# Patient Record
Sex: MALE | Race: WHITE | HISPANIC OR LATINO | Employment: FULL TIME | URBAN - METROPOLITAN AREA
[De-identification: names, ages, dates, MRNs, and addresses within clinical notes are randomized per-mention and may not be internally consistent; named-entity substitution may affect disease eponyms.]

---

## 2017-02-22 ENCOUNTER — HOSPITAL ENCOUNTER (EMERGENCY)
Facility: HOSPITAL | Age: 47
Discharge: HOME | End: 2017-02-22
Attending: EMERGENCY MEDICINE | Admitting: EMERGENCY MEDICINE

## 2017-02-22 ENCOUNTER — APPOINTMENT (OUTPATIENT)
Dept: GENERAL RADIOLOGY | Facility: HOSPITAL | Age: 47
End: 2017-02-22

## 2017-02-22 ENCOUNTER — APPOINTMENT (OUTPATIENT)
Dept: CT IMAGING | Facility: HOSPITAL | Age: 47
End: 2017-02-22

## 2017-02-22 VITALS
TEMPERATURE: 98.6 F | RESPIRATION RATE: 16 BRPM | WEIGHT: 235 LBS | OXYGEN SATURATION: 98 % | DIASTOLIC BLOOD PRESSURE: 96 MMHG | SYSTOLIC BLOOD PRESSURE: 135 MMHG | BODY MASS INDEX: 35.61 KG/M2 | HEIGHT: 68 IN | HEART RATE: 59 BPM

## 2017-02-22 DIAGNOSIS — K52.9 CHRONIC DIARRHEA: ICD-10-CM

## 2017-02-22 DIAGNOSIS — R07.89 CHEST TIGHTNESS: ICD-10-CM

## 2017-02-22 DIAGNOSIS — I10 UNCONTROLLED HYPERTENSION: ICD-10-CM

## 2017-02-22 DIAGNOSIS — K76.0 FATTY LIVER: ICD-10-CM

## 2017-02-22 DIAGNOSIS — Z91.14 NONCOMPLIANCE WITH MEDICATIONS: ICD-10-CM

## 2017-02-22 DIAGNOSIS — M54.2 NECK PAIN ON LEFT SIDE: Primary | ICD-10-CM

## 2017-02-22 LAB
ALBUMIN SERPL-MCNC: 3.7 G/DL (ref 3.2–4.8)
ALBUMIN/GLOB SERPL: 1.3 G/DL (ref 1.5–2.5)
ALP SERPL-CCNC: 92 U/L (ref 25–100)
ALT SERPL W P-5'-P-CCNC: 293 U/L (ref 7–40)
ANION GAP SERPL CALCULATED.3IONS-SCNC: 3 MMOL/L (ref 3–11)
AST SERPL-CCNC: 139 U/L (ref 0–33)
BASOPHILS # BLD AUTO: 0.03 10*3/MM3 (ref 0–0.2)
BASOPHILS NFR BLD AUTO: 0.5 % (ref 0–1)
BILIRUB SERPL-MCNC: 0.5 MG/DL (ref 0.3–1.2)
BNP SERPL-MCNC: 39 PG/ML (ref 0–100)
BUN BLD-MCNC: 9 MG/DL (ref 9–23)
BUN/CREAT SERPL: 11.3 (ref 7–25)
CALCIUM SPEC-SCNC: 8.5 MG/DL (ref 8.7–10.4)
CHLORIDE SERPL-SCNC: 109 MMOL/L (ref 99–109)
CO2 SERPL-SCNC: 28 MMOL/L (ref 20–31)
CREAT BLD-MCNC: 0.8 MG/DL (ref 0.6–1.3)
DEPRECATED RDW RBC AUTO: 45.5 FL (ref 37–54)
EOSINOPHIL # BLD AUTO: 0.2 10*3/MM3 (ref 0.1–0.3)
EOSINOPHIL NFR BLD AUTO: 3.6 % (ref 0–3)
ERYTHROCYTE [DISTWIDTH] IN BLOOD BY AUTOMATED COUNT: 13.4 % (ref 11.3–14.5)
GFR SERPL CREATININE-BSD FRML MDRD: 103 ML/MIN/1.73
GLOBULIN UR ELPH-MCNC: 2.9 GM/DL
GLUCOSE BLD-MCNC: 105 MG/DL (ref 70–100)
HCT VFR BLD AUTO: 38.8 % (ref 38.9–50.9)
HGB BLD-MCNC: 13.7 G/DL (ref 13.1–17.5)
HOLD SPECIMEN: NORMAL
HOLD SPECIMEN: NORMAL
IMM GRANULOCYTES # BLD: 0.01 10*3/MM3 (ref 0–0.03)
IMM GRANULOCYTES NFR BLD: 0.2 % (ref 0–0.6)
LIPASE SERPL-CCNC: 32 U/L (ref 6–51)
LYMPHOCYTES # BLD AUTO: 1.93 10*3/MM3 (ref 0.6–4.8)
LYMPHOCYTES NFR BLD AUTO: 34.6 % (ref 24–44)
MCH RBC QN AUTO: 32.8 PG (ref 27–31)
MCHC RBC AUTO-ENTMCNC: 35.3 G/DL (ref 32–36)
MCV RBC AUTO: 92.8 FL (ref 80–99)
MONOCYTES # BLD AUTO: 0.56 10*3/MM3 (ref 0–1)
MONOCYTES NFR BLD AUTO: 10.1 % (ref 0–12)
NEUTROPHILS # BLD AUTO: 2.84 10*3/MM3 (ref 1.5–8.3)
NEUTROPHILS NFR BLD AUTO: 51 % (ref 41–71)
PLATELET # BLD AUTO: 200 10*3/MM3 (ref 150–450)
PMV BLD AUTO: 10.2 FL (ref 6–12)
POTASSIUM BLD-SCNC: 3.6 MMOL/L (ref 3.5–5.5)
PROT SERPL-MCNC: 6.6 G/DL (ref 5.7–8.2)
RBC # BLD AUTO: 4.18 10*6/MM3 (ref 4.2–5.76)
SODIUM BLD-SCNC: 140 MMOL/L (ref 132–146)
TROPONIN I SERPL-MCNC: 0 NG/ML (ref 0–0.07)
WBC NRBC COR # BLD: 5.57 10*3/MM3 (ref 3.5–10.8)
WHOLE BLOOD HOLD SPECIMEN: NORMAL
WHOLE BLOOD HOLD SPECIMEN: NORMAL

## 2017-02-22 PROCEDURE — 96374 THER/PROPH/DIAG INJ IV PUSH: CPT

## 2017-02-22 PROCEDURE — 96376 TX/PRO/DX INJ SAME DRUG ADON: CPT

## 2017-02-22 PROCEDURE — 85025 COMPLETE CBC W/AUTO DIFF WBC: CPT | Performed by: EMERGENCY MEDICINE

## 2017-02-22 PROCEDURE — 83690 ASSAY OF LIPASE: CPT | Performed by: EMERGENCY MEDICINE

## 2017-02-22 PROCEDURE — 80053 COMPREHEN METABOLIC PANEL: CPT | Performed by: EMERGENCY MEDICINE

## 2017-02-22 PROCEDURE — 93005 ELECTROCARDIOGRAM TRACING: CPT | Performed by: EMERGENCY MEDICINE

## 2017-02-22 PROCEDURE — 96375 TX/PRO/DX INJ NEW DRUG ADDON: CPT

## 2017-02-22 PROCEDURE — 25010000002 HYDRALAZINE PER 20 MG: Performed by: EMERGENCY MEDICINE

## 2017-02-22 PROCEDURE — 71010 HC CHEST PA OR AP: CPT

## 2017-02-22 PROCEDURE — 25010000002 DIAZEPAM PER 5 MG: Performed by: EMERGENCY MEDICINE

## 2017-02-22 PROCEDURE — 99285 EMERGENCY DEPT VISIT HI MDM: CPT

## 2017-02-22 PROCEDURE — 70450 CT HEAD/BRAIN W/O DYE: CPT

## 2017-02-22 PROCEDURE — 83880 ASSAY OF NATRIURETIC PEPTIDE: CPT | Performed by: EMERGENCY MEDICINE

## 2017-02-22 PROCEDURE — 84484 ASSAY OF TROPONIN QUANT: CPT

## 2017-02-22 RX ORDER — HYDROCHLOROTHIAZIDE 12.5 MG/1
12.5 TABLET ORAL DAILY
Status: ON HOLD | COMMUNITY
End: 2018-06-11

## 2017-02-22 RX ORDER — EMTRICITABINE AND TENOFOVIR DISOPROXIL FUMARATE 200; 300 MG/1; MG/1
1 TABLET, FILM COATED ORAL DAILY
COMMUNITY
End: 2017-02-27

## 2017-02-22 RX ORDER — DIAZEPAM 5 MG/ML
5 INJECTION, SOLUTION INTRAMUSCULAR; INTRAVENOUS ONCE
Status: COMPLETED | OUTPATIENT
Start: 2017-02-22 | End: 2017-02-22

## 2017-02-22 RX ORDER — MECLIZINE HYDROCHLORIDE 25 MG/1
25 TABLET ORAL 3 TIMES DAILY PRN
Qty: 21 TABLET | Refills: 0 | Status: SHIPPED | OUTPATIENT
Start: 2017-02-22 | End: 2017-02-27

## 2017-02-22 RX ORDER — SODIUM CHLORIDE 0.9 % (FLUSH) 0.9 %
10 SYRINGE (ML) INJECTION AS NEEDED
Status: DISCONTINUED | OUTPATIENT
Start: 2017-02-22 | End: 2017-02-22 | Stop reason: HOSPADM

## 2017-02-22 RX ORDER — HYDRALAZINE HYDROCHLORIDE 20 MG/ML
10 INJECTION INTRAMUSCULAR; INTRAVENOUS ONCE
Status: COMPLETED | OUTPATIENT
Start: 2017-02-22 | End: 2017-02-22

## 2017-02-22 RX ORDER — AMLODIPINE BESYLATE 10 MG/1
10 TABLET ORAL DAILY
Status: ON HOLD | COMMUNITY
End: 2018-06-11

## 2017-02-22 RX ORDER — ASPIRIN 81 MG/1
324 TABLET, CHEWABLE ORAL ONCE
Status: COMPLETED | OUTPATIENT
Start: 2017-02-22 | End: 2017-02-22

## 2017-02-22 RX ORDER — TRAMADOL HYDROCHLORIDE 50 MG/1
50 TABLET ORAL EVERY 4 HOURS PRN
Qty: 20 TABLET | Refills: 0 | Status: SHIPPED | OUTPATIENT
Start: 2017-02-22 | End: 2017-02-27

## 2017-02-22 RX ORDER — LOSARTAN POTASSIUM 100 MG/1
100 TABLET ORAL DAILY
Status: ON HOLD | COMMUNITY
End: 2018-06-11

## 2017-02-22 RX ADMIN — ASPIRIN 81 MG 324 MG: 81 TABLET ORAL at 15:26

## 2017-02-22 RX ADMIN — HYDRALAZINE HYDROCHLORIDE 10 MG: 20 INJECTION INTRAMUSCULAR; INTRAVENOUS at 15:32

## 2017-02-22 RX ADMIN — HYDRALAZINE HYDROCHLORIDE 10 MG: 20 INJECTION INTRAMUSCULAR; INTRAVENOUS at 17:49

## 2017-02-22 RX ADMIN — DIAZEPAM 5 MG: 5 INJECTION, SOLUTION INTRAMUSCULAR; INTRAVENOUS at 15:34

## 2017-02-22 NOTE — DISCHARGE INSTRUCTIONS
Follow up with one of the [facility] physician groups below to setup primary care. If you have trouble following up, please call Anya Khan, our transitional care nurse, at [phone].    (Dr. Fitch, Dr. Saavedra, Dr. Yost, and Dr. Velez.)  [facility], Primary Care, [phone], [address], [place] [address]    [facility], Primary Care, [phone], [address], [address] [place], [address] [facility]) [facility] Medical [facility], Primary Care, [phone], [address], [address] [place], [address] [facility], Primary Care, [phone], [address] [place], [address]     [place] [facility], Primary Care, [phone], [address], [address] [place], [address]    [place] [facility], Primary Care, [phone], [address]    [place], [address] [facility], Primary Care, [phone], [address], [address] [place], [address] [facility], Primary Care, [phone], [address] [place], [address] [facility]) [facility], Primary Care, [phone], [address] [place], [address] [facility], Primary Care, [phone], [address], [address] [place], [address] [facility], Primary Care, [phone], [address] [facility] [facility], [place], [address]     [facility], Primary Care, [phone], [address] [id number]

## 2017-02-22 NOTE — ED PROVIDER NOTES
Subjective   HPI Comments: The patient presents to emergency department via EMS secondary to complaint of elevated blood pressure as well as left-sided neck pain and bitemporal headache.  The patient reports he developed the symptoms this morning.  He reports the initial symptoms were in the posterior left side of the neck.  He reports it radiated up and eventually caused a severe bitemporal headache.  The patient also noted some tingling lateral to the left eye and some tingling in the left cheek.  The patient reports a history of hypertension and states he hasn't taken his blood pressure medicine for the last 2-3 days.  The patient denies that the headache was thunderclap.  The symptoms are much better now with only some mild pain in the left side of the neck as well as in the temporal regions.  The patient in route to the hospital with the ambulance had a brief episode of chest tightness.  He denies any decreased exercise tolerance or decreased activity.  He denies any dyspnea on exertion or pain on exertion.  The patient's history includes hypertension, fatty liver, and a history of some sort of blockage so the patient doesn't know where.      History provided by:  Patient and EMS personnel      Review of Systems   Constitutional: Negative.  Negative for chills and fever.   Respiratory: Positive for chest tightness.    Cardiovascular: Negative.    Gastrointestinal: Negative.    Genitourinary: Negative.    Musculoskeletal: Positive for neck pain. Negative for back pain.   Allergic/Immunologic: Negative for immunocompromised state.   Neurological: Positive for dizziness and headaches.   Hematological: Does not bruise/bleed easily.   Psychiatric/Behavioral: Negative.    All other systems reviewed and are negative.      Past Medical History   Diagnosis Date   • Fatty liver    • Hypertension        No Known Allergies    Past Surgical History   Procedure Laterality Date   • Knee surgery         History reviewed. No  pertinent family history.    Social History     Social History   • Marital status:      Spouse name: N/A   • Number of children: N/A   • Years of education: N/A     Social History Main Topics   • Smoking status: Never Smoker   • Smokeless tobacco: None   • Alcohol use Yes      Comment: occassionally   • Drug use: No   • Sexual activity: Not Asked     Other Topics Concern   • None     Social History Narrative   • None           Objective   Physical Exam   Constitutional: He is oriented to person, place, and time. He appears well-developed and well-nourished. No distress.   HENT:   Head: Normocephalic and atraumatic.   Right Ear: External ear normal.   Left Ear: External ear normal.   Nose: Nose normal.   Mouth/Throat: Oropharynx is clear and moist.   Eyes: EOM are normal. Pupils are equal, round, and reactive to light.   Neck: Normal range of motion. Neck supple. No tracheal deviation present.   Mild left lateral neck tenderness to palpation.  Full range of motion.  No meningeal signs or nuchal rigidity.   Cardiovascular: Normal rate, regular rhythm, normal heart sounds and intact distal pulses.    Pulmonary/Chest: Effort normal and breath sounds normal.   Abdominal: Soft. Bowel sounds are normal. There is no tenderness.   Musculoskeletal: Normal range of motion. He exhibits no edema or tenderness.   Neurological: He is alert and oriented to person, place, and time.   Skin: Skin is warm and dry.   Psychiatric: He has a normal mood and affect. His behavior is normal. Judgment and thought content normal.   Nursing note and vitals reviewed.      Procedures         ED Course  ED Course   Value Comment By Time   Total Protein: 6.6 (Reviewed) Mo Bonner MD 02/22 1730    Findings and plan discussed.  Patient reports improvement in his symptoms and has only minimal dizziness persisting.  I advised the patient on his need to take his medications as prescribed.  He reports he has limited home.  I also advised  the patient needs to follow up with cardiology for further evaluation.  Also advised him that we need to establish a primary care physician for his other complaints.  Patient has several complaints here in the emergency department including the dizziness and neck pain as well as some chest tightness, elevated blood pressure, chronic diarrhea, and fatty liver.  Advised to use follow-up with the specialist as advised hand with a primary care physician for ongoing management and evaluation.  He voices understanding and agrees. Mo Bonner MD 02/22 1748     Recent Results (from the past 24 hour(s))   Comprehensive Metabolic Panel    Collection Time: 02/22/17  3:52 PM   Result Value Ref Range    Glucose 105 (H) 70 - 100 mg/dL    BUN 9 9 - 23 mg/dL    Creatinine 0.80 0.60 - 1.30 mg/dL    Sodium 140 132 - 146 mmol/L    Potassium 3.6 3.5 - 5.5 mmol/L    Chloride 109 99 - 109 mmol/L    CO2 28.0 20.0 - 31.0 mmol/L    Calcium 8.5 (L) 8.7 - 10.4 mg/dL    Total Protein 6.6 5.7 - 8.2 g/dL    Albumin 3.70 3.20 - 4.80 g/dL    ALT (SGPT) 293 (H) 7 - 40 U/L    AST (SGOT) 139 (H) 0 - 33 U/L    Alkaline Phosphatase 92 25 - 100 U/L    Total Bilirubin 0.5 0.3 - 1.2 mg/dL    eGFR Non African Amer 103 >60 mL/min/1.73    Globulin 2.9 gm/dL    A/G Ratio 1.3 (L) 1.5 - 2.5 g/dL    BUN/Creatinine Ratio 11.3 7.0 - 25.0    Anion Gap 3.0 3.0 - 11.0 mmol/L   Lipase    Collection Time: 02/22/17  3:52 PM   Result Value Ref Range    Lipase 32 6 - 51 U/L   BNP    Collection Time: 02/22/17  3:52 PM   Result Value Ref Range    BNP 39.0 0.0 - 100.0 pg/mL   Light Blue Top    Collection Time: 02/22/17  3:52 PM   Result Value Ref Range    Extra Tube hold for add-on    Green Top (Gel)    Collection Time: 02/22/17  3:52 PM   Result Value Ref Range    Extra Tube Hold for add-ons.    Lavender Top    Collection Time: 02/22/17  3:52 PM   Result Value Ref Range    Extra Tube hold for add-on    Gold Top - SST    Collection Time: 02/22/17  3:52 PM   Result  Value Ref Range    Extra Tube Hold for add-ons.    CBC Auto Differential    Collection Time: 02/22/17  3:52 PM   Result Value Ref Range    WBC 5.57 3.50 - 10.80 10*3/mm3    RBC 4.18 (L) 4.20 - 5.76 10*6/mm3    Hemoglobin 13.7 13.1 - 17.5 g/dL    Hematocrit 38.8 (L) 38.9 - 50.9 %    MCV 92.8 80.0 - 99.0 fL    MCH 32.8 (H) 27.0 - 31.0 pg    MCHC 35.3 32.0 - 36.0 g/dL    RDW 13.4 11.3 - 14.5 %    RDW-SD 45.5 37.0 - 54.0 fl    MPV 10.2 6.0 - 12.0 fL    Platelets 200 150 - 450 10*3/mm3    Neutrophil % 51.0 41.0 - 71.0 %    Lymphocyte % 34.6 24.0 - 44.0 %    Monocyte % 10.1 0.0 - 12.0 %    Eosinophil % 3.6 (H) 0.0 - 3.0 %    Basophil % 0.5 0.0 - 1.0 %    Immature Grans % 0.2 0.0 - 0.6 %    Neutrophils, Absolute 2.84 1.50 - 8.30 10*3/mm3    Lymphocytes, Absolute 1.93 0.60 - 4.80 10*3/mm3    Monocytes, Absolute 0.56 0.00 - 1.00 10*3/mm3    Eosinophils, Absolute 0.20 0.10 - 0.30 10*3/mm3    Basophils, Absolute 0.03 0.00 - 0.20 10*3/mm3    Immature Grans, Absolute 0.01 0.00 - 0.03 10*3/mm3   POC Troponin, Rapid    Collection Time: 02/22/17  3:54 PM   Result Value Ref Range    Troponin I 0.00 0.00 - 0.07 ng/mL     Note: In addition to lab results from this visit, the labs listed above may include labs taken at another facility or during a different encounter within the last 24 hours. Please correlate lab times with ED admission and discharge times for further clarification of the services performed during this visit.    CT Head Without Contrast   Preliminary Result   Mucous retention cyst in the right maxillary sinus. No   evidence of acute intracranial abnormality identified.       DICTATED:     02/22/2017   EDITED:         02/22/2017          XR Chest 1 View   Preliminary Result   Negative lordotic portable chest. Edema, free fluid, active   disease or acute abnormality is not identified.       DICTATED:     02/22/2017   EDITED:         02/22/2017            Vitals:    02/22/17 1530 02/22/17 1630 02/22/17 1751 02/22/17 1801    BP: 138/99 124/88 (!) 138/112 135/96   Pulse: 57 63 61 59   Resp:   20 16   Temp:    98.6 °F (37 °C)   SpO2: 96% 98% 97% 98%   Weight:       Height:         Medications   aspirin chewable tablet 324 mg (324 mg Oral Given 2/22/17 1526)   hydrALAZINE (APRESOLINE) injection 10 mg (10 mg Intravenous Given 2/22/17 1532)   diazePAM (VALIUM) injection 5 mg (5 mg Intravenous Given 2/22/17 1534)   hydrALAZINE (APRESOLINE) injection 10 mg (10 mg Intravenous Given 2/22/17 1749)     ECG/EMG Results (last 24 hours)     Procedure Component Value Units Date/Time    ECG 12 Lead [[id number]] Collected:  02/22/17 1514     Updated:  02/22/17 1514                     MDM  Number of Diagnoses or Management Options  Chest tightness:   Chronic diarrhea:   Fatty liver:   Neck pain on left side:   Noncompliance with medications:   Uncontrolled hypertension:   Diagnosis management comments: ECG/EMG Results (last 24 hours)     ** No results found for the last 24 hours. **             Amount and/or Complexity of Data Reviewed  Clinical lab tests: reviewed  Tests in the radiology section of CPT®: reviewed  Independent visualization of images, tracings, or specimens: yes        Final diagnoses:   Neck pain on left side   Uncontrolled hypertension   Noncompliance with medications   Chronic diarrhea   Fatty liver   Chest tightness            Natalia Gonzales  02/22/17 1633       Mo Bonner MD  02/22/17 5847

## 2017-02-27 ENCOUNTER — HOSPITAL ENCOUNTER (OUTPATIENT)
Dept: CARDIOLOGY | Facility: HOSPITAL | Age: 47
Discharge: HOME | End: 2017-02-27

## 2017-02-27 ENCOUNTER — HOSPITAL ENCOUNTER (OUTPATIENT)
Dept: CARDIOLOGY | Facility: HOSPITAL | Age: 47
Discharge: HOME | End: 2017-02-27
Admitting: NURSE PRACTITIONER

## 2017-02-27 ENCOUNTER — OFFICE VISIT (OUTPATIENT)
Dept: CARDIOLOGY | Facility: HOSPITAL | Age: 47
End: 2017-02-27

## 2017-02-27 VITALS
HEART RATE: 83 BPM | OXYGEN SATURATION: 99 % | RESPIRATION RATE: 20 BRPM | BODY MASS INDEX: 37.89 KG/M2 | HEIGHT: 68 IN | TEMPERATURE: 97.6 F | SYSTOLIC BLOOD PRESSURE: 122 MMHG | WEIGHT: 250 LBS | DIASTOLIC BLOOD PRESSURE: 82 MMHG

## 2017-02-27 DIAGNOSIS — R07.89 CHEST PRESSURE: Primary | ICD-10-CM

## 2017-02-27 DIAGNOSIS — R06.09 DYSPNEA ON EXERTION: ICD-10-CM

## 2017-02-27 LAB
BH CV STRESS BP STAGE 1: NORMAL
BH CV STRESS BP STAGE 3: NORMAL
BH CV STRESS COMMENTS STAGE 1: NORMAL
BH CV STRESS DOSE REGADENOSON STAGE 1: 0.4
BH CV STRESS DURATION MIN STAGE 1: 1
BH CV STRESS DURATION MIN STAGE 2: 1
BH CV STRESS DURATION MIN STAGE 3: 1
BH CV STRESS DURATION MIN STAGE 4: 1
BH CV STRESS DURATION SEC STAGE 2: 0
BH CV STRESS HR STAGE 1: 66
BH CV STRESS HR STAGE 2: 86
BH CV STRESS HR STAGE 3: 78
BH CV STRESS HR STAGE 4: 78
BH CV STRESS PROTOCOL 1: NORMAL
BH CV STRESS RECOVERY BP: NORMAL MMHG
BH CV STRESS RECOVERY HR: 74 BPM
BH CV STRESS STAGE 1: 1
BH CV STRESS STAGE 2: 2
BH CV STRESS STAGE 3: 3
BH CV STRESS STAGE 4: 4
LV EF NUC BP: 60 %
MAXIMAL PREDICTED HEART RATE: 172 BPM
PERCENT MAX PREDICTED HR: 55.81 %
STRESS BASELINE BP: NORMAL MMHG
STRESS BASELINE HR: 55 BPM
STRESS PERCENT HR: 66 %
STRESS POST PEAK BP: NORMAL MMHG
STRESS POST PEAK HR: 96 BPM
STRESS TARGET HR: 146 BPM

## 2017-02-27 PROCEDURE — 25010000002 REGADENOSON 0.4 MG/5ML SOLUTION: Performed by: NURSE PRACTITIONER

## 2017-02-27 PROCEDURE — 78451 HT MUSCLE IMAGE SPECT SING: CPT

## 2017-02-27 PROCEDURE — 93018 CV STRESS TEST I&R ONLY: CPT | Performed by: INTERNAL MEDICINE

## 2017-02-27 PROCEDURE — 78452 HT MUSCLE IMAGE SPECT MULT: CPT | Performed by: INTERNAL MEDICINE

## 2017-02-27 PROCEDURE — 93017 CV STRESS TEST TRACING ONLY: CPT

## 2017-02-27 PROCEDURE — A9500 TC99M SESTAMIBI: HCPCS | Performed by: NURSE PRACTITIONER

## 2017-02-27 PROCEDURE — 0 TECHNETIUM SESTAMIBI: Performed by: NURSE PRACTITIONER

## 2017-02-27 RX ORDER — OMEPRAZOLE 40 MG/1
1 CAPSULE, DELAYED RELEASE ORAL DAILY
Status: ON HOLD | COMMUNITY
Start: 2017-02-25 | End: 2018-06-11

## 2017-02-27 RX ADMIN — Medication 1 DOSE: at 12:10

## 2017-02-27 RX ADMIN — REGADENOSON 0.4 MG: 0.08 INJECTION, SOLUTION INTRAVENOUS at 13:55

## 2017-02-27 RX ADMIN — Medication 1 DOSE: at 13:55

## 2017-02-27 NOTE — PROGRESS NOTES
"[facility]  Chest Pain Clinic    Encounter Date:02/27/2017     Sandra Colon  [address] [place] [address]  [phone]    1970    Maria Mendoza Reyes, MD    Sandra Colon is a 47 y.o. male.      Subjective:     Chief Complaint:  Establish Care (s/p ED Visit for Chest pain)       HPI :    This pleasant  male was seen in the ED last week and referred to our chest pain clinic for further evaluation.    He has a history of hyperlipidemia, hypertension, and fatty liver.  Unfortunately he is a very poor historian.  He takes his blood pressure  medicine erratically.  On the day he went to the ED he had not taken his blood pressure medicine for 2-3 days and developed a headache with numbness on the left side of his face and heaviness in his left arm.  He went to the [facility] station to have his blood pressure checked and they had him go by ambulance as they were afraid he might be having a stroke.  CT scan in the ED was negative.    His EKG and troponin levels were unremarkable.  He had sinus bradycardia.  He was given blood pressure medicines and was referred to a gastroenterologist secondary to his elevated ALT and AST.    He comes in today relating that he has shortness of breath with exertion, precordial chest pressure and sometimes pain.  He had been exercising 6 months ago and did not have the chest pain or pressure during exercise but when he stopped the symptoms were fairly significant.  He tells me that 8 years ago he had chest pain and pressure and had a stress test in [place] by Dr. Virk he was told TE had \"3 blockages\".    Since December 2016 he has been drinking heavily on the weekends, drinking a half a bottle of tequila.  He has noticed some soreness in his right side.  He has been told that his gallbladder doesn't work very well.    Cardiac risk factors:  History of dyslipidemia, hypertension,  sedentary lifestyle, obesity (BMI > 30), gender, " age (>50)    No Known Allergies      Current Outpatient Prescriptions:   •  amLODIPine (NORVASC) 10 MG tablet, Take 10 mg by mouth Daily., Disp: , Rfl:   •  aspirin 81 MG tablet, Take 1 tablet by mouth Daily., Disp: 30 tablet, Rfl: 0  •  emtricitabine-tenofovir (TRUVADA) 200-300 MG per tablet, Take 1 tablet by mouth Daily., Disp: , Rfl:   •  hydrochlorothiazide (HYDRODIURIL) 12.5 MG tablet, Take 12.5 mg by mouth Daily., Disp: , Rfl:   •  losartan (COZAAR) 100 MG tablet, Take 100 mg by mouth Daily., Disp: , Rfl:   •  meclizine (ANTIVERT) 25 MG tablet, Take 1 tablet by mouth 3 (Three) Times a Day As Needed for dizziness., Disp: 21 tablet, Rfl: 0  •  NON FORMULARY, , Disp: , Rfl:   •  raltegravir (ISENTRESS) 400 MG tablet, Take 400 mg by mouth 2 (Two) Times a Day., Disp: , Rfl:   •  traMADol (ULTRAM) 50 MG tablet, Take 1 tablet by mouth Every 4 (Four) Hours As Needed for moderate pain (4-6)., Disp: 20 tablet, Rfl: 0    The following portions of the patient's history were reviewed and updated as appropriate in Epic:  Problem list, allergies, current medications, past medical and surgical history, past social and family history.     Review of Systems   Constitution: Positive for chills, decreased appetite, malaise/fatigue, night sweats and weight gain (7 lbs). Negative for diaphoresis, fever, weakness and weight loss.   HENT: Negative for congestion and nosebleeds.    Eyes: Positive for blurred vision. Negative for double vision.   Cardiovascular: Positive for dyspnea on exertion and orthopnea. Negative for claudication, cyanosis, irregular heartbeat, leg swelling, near-syncope, palpitations, paroxysmal nocturnal dyspnea and syncope. Chest pain: chest tightness.   Respiratory: Positive for shortness of breath and snoring. Negative for hemoptysis, sleep disturbances due to breathing and wheezing.    Endocrine: Negative.    Hematologic/Lymphatic: Negative for adenopathy and bleeding problem. Does not bruise/bleed easily.  "  Skin: Negative.  Negative for rash.   Musculoskeletal: Positive for arthritis and joint pain. Negative for falls, muscle cramps, muscle weakness and myalgias.        Neck Pain   Gastrointestinal: Positive for bloating. Negative for anorexia, melena, nausea and vomiting.   Genitourinary: Negative for dysuria, hematuria and nocturia.   Neurological: Positive for dizziness and loss of balance. Negative for excessive daytime sleepiness, focal weakness, light-headedness and seizures.   Psychiatric/Behavioral: The patient does not have insomnia.        Objective:     Vitals:    02/27/17 0900 02/27/17 0901 02/27/17 0902   BP: 134/89 131/92 122/82   BP Location: Right arm Left arm Left arm   Patient Position: Sitting Sitting Standing   Cuff Size: Adult     Pulse: 60  83   Resp: 20     Temp: 97.6 °F (36.4 °C)     TempSrc: Temporal Artery      SpO2: 99%     Weight: 250 lb (113 kg)     Height: 68\" (172.7 cm)           Physical Exam   Constitutional:   Obese   HENT:   Mouth/Throat: No oropharyngeal exudate.   Eyes: No scleral icterus.   Neck: No JVD present.   Cardiovascular: Normal rate, normal heart sounds and intact distal pulses.  Exam reveals no gallop and no friction rub.    No murmur heard.  Pulmonary/Chest: No respiratory distress. He has no wheezes. He has no rales.   Abdominal: He exhibits no distension.   Right upper quadrant tenderness with palpitation   Musculoskeletal: He exhibits edema.   Trace lower extremity edema bilaterally from the pretibial areas to the ankles   Neurological:   Negative for nystagmus, tremor, focal deficit   Skin: No erythema.   Psychiatric: He has a normal mood and affect.   Unaccompanied in the exam room today       Lab and Diagnostic Review:    Assessment and Plan:     1. Chest pressure  - Stress Test With Myocardial Perfusion (1 Day)  - Adult Transthoracic Echo Complete; Future    2. Dyspnea on exertion  -Echocardiogram    3. fatty liver  -Patient has appointment to see Dr. Mccullough in " the near future    *Please note that portions of this note were completed with a voice recognition program. Efforts were made to edit the dictations, but occasionally words are mistranscribed.

## 2017-03-03 ENCOUNTER — HOSPITAL ENCOUNTER (OUTPATIENT)
Dept: CARDIOLOGY | Facility: HOSPITAL | Age: 47
Discharge: HOME | End: 2017-03-03
Admitting: NURSE PRACTITIONER

## 2017-03-03 VITALS
DIASTOLIC BLOOD PRESSURE: 88 MMHG | BODY MASS INDEX: 37.89 KG/M2 | SYSTOLIC BLOOD PRESSURE: 124 MMHG | HEIGHT: 68 IN | WEIGHT: 250 LBS

## 2017-03-03 DIAGNOSIS — R07.89 CHEST PRESSURE: ICD-10-CM

## 2017-03-03 LAB
BH CV ECHO MEAS - AO ROOT AREA (BSA CORRECTED): 1.8
BH CV ECHO MEAS - AO ROOT AREA: 12.3 CM^2
BH CV ECHO MEAS - AO ROOT DIAM: 4 CM
BH CV ECHO MEAS - BSA(HAYCOCK): 2.4 M^2
BH CV ECHO MEAS - BSA: 2.2 M^2
BH CV ECHO MEAS - BZI_BMI: 38 KILOGRAMS/M^2
BH CV ECHO MEAS - BZI_METRIC_HEIGHT: 172.7 CM
BH CV ECHO MEAS - BZI_METRIC_WEIGHT: 113.4 KG
BH CV ECHO MEAS - CONTRAST EF 4CH: 67.8 ML/M^2
BH CV ECHO MEAS - EDV(CUBED): 101 ML
BH CV ECHO MEAS - EDV(MOD-SP4): 87 ML
BH CV ECHO MEAS - EDV(TEICH): 100.2 ML
BH CV ECHO MEAS - EF(CUBED): 77.8 %
BH CV ECHO MEAS - EF(MOD-SP4): 67.8 %
BH CV ECHO MEAS - EF(TEICH): 70 %
BH CV ECHO MEAS - ESV(CUBED): 22.4 ML
BH CV ECHO MEAS - ESV(MOD-SP4): 28 ML
BH CV ECHO MEAS - ESV(TEICH): 30 ML
BH CV ECHO MEAS - FS: 39.5 %
BH CV ECHO MEAS - IVS/LVPW: 1.2
BH CV ECHO MEAS - IVSD: 1.3 CM
BH CV ECHO MEAS - LA DIMENSION: 3.4 CM
BH CV ECHO MEAS - LA/AO: 0.85
BH CV ECHO MEAS - LAT PEAK E' VEL: 6 CM/SEC
BH CV ECHO MEAS - LV DIASTOLIC VOL/BSA (35-75): 38.7 ML/M^2
BH CV ECHO MEAS - LV MASS(C)D: 215.2 GRAMS
BH CV ECHO MEAS - LV MASS(C)DI: 95.7 GRAMS/M^2
BH CV ECHO MEAS - LV MAX PG: 4 MMHG
BH CV ECHO MEAS - LV MEAN PG: 1.5 MMHG
BH CV ECHO MEAS - LV SYSTOLIC VOL/BSA (12-30): 12.5 ML/M^2
BH CV ECHO MEAS - LV V1 MAX: 99.7 CM/SEC
BH CV ECHO MEAS - LV V1 MEAN: 52.4 CM/SEC
BH CV ECHO MEAS - LV V1 VTI: 23.7 CM
BH CV ECHO MEAS - LVIDD: 4.7 CM
BH CV ECHO MEAS - LVIDS: 2.8 CM
BH CV ECHO MEAS - LVLD AP4: 8.2 CM
BH CV ECHO MEAS - LVLS AP4: 7.1 CM
BH CV ECHO MEAS - LVOT AREA (M): 3.8 CM^2
BH CV ECHO MEAS - LVOT AREA: 3.8 CM^2
BH CV ECHO MEAS - LVOT DIAM: 2.2 CM
BH CV ECHO MEAS - LVPWD: 1.1 CM
BH CV ECHO MEAS - MED PEAK E' VEL: 4.6 CM/SEC
BH CV ECHO MEAS - MV A MAX VEL: 53.3 CM/SEC
BH CV ECHO MEAS - MV E MAX VEL: 62.7 CM/SEC
BH CV ECHO MEAS - MV E/A: 1.2
BH CV ECHO MEAS - PA ACC SLOPE: 557 CM/SEC^2
BH CV ECHO MEAS - PA ACC TIME: 0.13 SEC
BH CV ECHO MEAS - PA MAX PG: 0 MMHG
BH CV ECHO MEAS - PA PR(ACCEL): 19.6 MMHG
BH CV ECHO MEAS - PA V2 MAX: 2 CM/SEC
BH CV ECHO MEAS - RVDD: 3.7 CM
BH CV ECHO MEAS - SI(CUBED): 35 ML/M^2
BH CV ECHO MEAS - SI(LVOT): 40.5 ML/M^2
BH CV ECHO MEAS - SI(MOD-SP4): 26.3 ML/M^2
BH CV ECHO MEAS - SI(TEICH): 31.2 ML/M^2
BH CV ECHO MEAS - SV(CUBED): 78.6 ML
BH CV ECHO MEAS - SV(LVOT): 91 ML
BH CV ECHO MEAS - SV(MOD-SP4): 59 ML
BH CV ECHO MEAS - SV(TEICH): 70.1 ML
BH CV ECHO MEAS - TAPSE (>1.6): 3.1 CM2
BH CV XLRA - RV BASE: 3.6 CM
BH CV XLRA - RV LENGTH: 5.6 CM
BH CV XLRA - RV MID: 3.8 CM
BH CV XLRA - TDI S': 19.3 CM/SEC
E/E' RATIO: 10.3
LV EF 2D ECHO EST: 65 %

## 2017-03-03 PROCEDURE — 93306 TTE W/DOPPLER COMPLETE: CPT | Performed by: INTERNAL MEDICINE

## 2017-03-03 PROCEDURE — 93306 TTE W/DOPPLER COMPLETE: CPT

## 2017-08-24 ENCOUNTER — TRANSCRIBE ORDERS (OUTPATIENT)
Dept: ADMINISTRATIVE | Facility: HOSPITAL | Age: 47
End: 2017-08-24

## 2017-08-24 DIAGNOSIS — R74.8 ABNORMAL TRANSAMINASES: Primary | ICD-10-CM

## 2017-08-29 ENCOUNTER — HOSPITAL ENCOUNTER (OUTPATIENT)
Dept: ULTRASOUND IMAGING | Facility: HOSPITAL | Age: 47
Discharge: HOME | End: 2017-08-29
Admitting: INTERNAL MEDICINE

## 2017-08-29 DIAGNOSIS — R74.8 ABNORMAL TRANSAMINASES: ICD-10-CM

## 2017-08-29 PROCEDURE — 76700 US EXAM ABDOM COMPLETE: CPT

## 2017-09-12 ENCOUNTER — TRANSCRIBE ORDERS (OUTPATIENT)
Dept: ADMINISTRATIVE | Facility: HOSPITAL | Age: 47
End: 2017-09-12

## 2017-09-12 DIAGNOSIS — N28.1 CYST OF LEFT KIDNEY: Primary | ICD-10-CM

## 2017-09-19 ENCOUNTER — HOSPITAL ENCOUNTER (OUTPATIENT)
Dept: CT IMAGING | Facility: HOSPITAL | Age: 47
Discharge: HOME | End: 2017-09-19
Admitting: INTERNAL MEDICINE

## 2017-09-19 DIAGNOSIS — N28.1 CYST OF LEFT KIDNEY: ICD-10-CM

## 2017-09-19 PROCEDURE — 0 IOPAMIDOL PER 1 ML: Performed by: INTERNAL MEDICINE

## 2017-09-19 PROCEDURE — 74170 CT ABD WO CNTRST FLWD CNTRST: CPT

## 2017-09-19 RX ADMIN — IOPAMIDOL 100 ML: 755 INJECTION, SOLUTION INTRAVENOUS at 10:50

## 2017-09-26 ENCOUNTER — TRANSCRIBE ORDERS (OUTPATIENT)
Dept: ENDOCRINOLOGY | Facility: CLINIC | Age: 47
End: 2017-09-26

## 2017-09-26 DIAGNOSIS — D35.02 BENIGN TUMOR OF ADRENAL GLAND, LEFT: Primary | ICD-10-CM

## 2017-10-12 ENCOUNTER — OFFICE VISIT (OUTPATIENT)
Dept: ENDOCRINOLOGY | Facility: CLINIC | Age: 47
End: 2017-10-12

## 2017-10-12 VITALS
OXYGEN SATURATION: 99 % | SYSTOLIC BLOOD PRESSURE: 162 MMHG | WEIGHT: 242.3 LBS | DIASTOLIC BLOOD PRESSURE: 108 MMHG | HEART RATE: 88 BPM | BODY MASS INDEX: 36.72 KG/M2

## 2017-10-12 DIAGNOSIS — E27.8 ADRENAL NODULE (HCC): Primary | ICD-10-CM

## 2017-10-12 DIAGNOSIS — I10 ESSENTIAL HYPERTENSION: ICD-10-CM

## 2017-10-12 PROCEDURE — 99243 OFF/OP CNSLTJ NEW/EST LOW 30: CPT | Performed by: INTERNAL MEDICINE

## 2017-10-12 RX ORDER — DEXAMETHASONE 1 MG
TABLET ORAL
Qty: 1 TABLET | Refills: 0 | Status: ON HOLD | OUTPATIENT
Start: 2017-10-12 | End: 2018-06-11

## 2017-10-12 NOTE — PROGRESS NOTES
"Chief Complaint   Patient presents with   • Establish Care     Adrenal Gland       HPI:   Sandra Colon is a 47 y.o.male sent in consultation by Ian Mccullough DO for further evaluation of his recent finding of left adrenal nodule . His history is as follows:    1) left adrenal nodule:  - Patient was recently evaluated by, gastroenterologist,  for diarrhea and elevated hepatic transaminases.  The patient is a city  and had a needle stick while in a  pipe in March 2017.  He received HIV prophylaxis medication but stopped the prescription after a few weeks due to intolerance. A month after stopping the medication he developed diarrhea that was persistent for several weeks.    - As part of his evaluation he had a colonoscopy and an ultrasound of the abdomen completed at [facility] radiology on 8/29/17.  The ultrasound report had described a left kidney nodule that was later confirmed to be an actual left adrenal nodule on CT scan of the abdomen:   \"The right adrenal gland is normal. There is a low dense mass in the left adrenal gland measuring 3.0 x 4.3 cm. On the unenhanced image the adrenal mass has an attenuation coefficient of 28.5 Hounsfield units. The arterial phase demonstrates an attenuation coefficient of 33.2 Hounsfield units. The  attenuation on venous imaging is 29.8 Hounsfield units and on delayed imaging 35.18 Hounsfield units. The mass demonstrates a thin rim of  contrast enhancement primarily posteriorly.\"     The patient's medical history significant for hypertension diagnosed in his late 30s or early 40s.  He reports taking his HTN medication intermittently. He will stop his BP meds when BP readings are normal. He denies episodes of headaches, anxiety, or diaphoresis. Was seen in ED in 02/2017 for headache, chest pain, elevated BP. Had stress test and echo which were WNL.     He is currently prescribed HCTZ 12.5 mg daily, losartan 100 mg daily, Norvasc 10 " mg daily.  However he held his medications for the past month after a few normal blood pressure readings.    FMHx: He has a family history of diabetes but reports no known family history of hypertension    Review of Systems   Constitutional: Positive for fatigue.   HENT: Negative.    Eyes: Positive for itching.   Respiratory: Negative.    Cardiovascular: Positive for leg swelling (feet swell intermittently).   Gastrointestinal: Negative.    Endocrine: Negative.    Genitourinary: Positive for frequency.   Musculoskeletal: Negative.    Skin: Negative.    Allergic/Immunologic: Negative.    Neurological: Negative.    Hematological: Negative.    Psychiatric/Behavioral: Negative.      Past Medical History:   Diagnosis Date   • Fatty liver    • Hypertension      family history includes Cancer in his paternal grandmother; Diabetes in his father and paternal grandfather; No Known Problems in his brother, mother, and sister.  Past Surgical History:   Procedure Laterality Date   • CYST REMOVAL      on wrist   • KNEE SURGERY       Social History   Substance Use Topics   • Smoking status: Never Smoker   • Smokeless tobacco: Never Used   • Alcohol use Yes      Comment: occassionally     Current Outpatient Prescriptions:   •  hydrochlorothiazide (HYDRODIURIL) 12.5 MG tablet, Take 12.5 mg by mouth Daily., Disp: , Rfl:   •  losartan (COZAAR) 100 MG tablet, Take 100 mg by mouth Daily., Disp: , Rfl:   •  omeprazole (priLOSEC) 40 MG capsule, Take 1 capsule by mouth Daily., Disp: , Rfl:   •  amLODIPine (NORVASC) 10 MG tablet, Take 10 mg by mouth Daily., Disp: , Rfl:   No Known Allergies    BP (!) 162/108 - no BP meds today  Pulse 88  Wt 242 lb 4.8 oz (110 kg)  SpO2 99%  BMI 36.72 kg/m2  Physical Exam   Constitutional: He is oriented to person, place, and time. He appears well-developed. No distress.   obese   HENT:   Head: Normocephalic.   Mouth/Throat: Oropharynx is clear and moist.   Eyes: Conjunctivae and EOM are normal. Pupils  are equal, round, and reactive to light.   Neck: Neck supple. No tracheal deviation present. No thyromegaly present.   No palpable thyroid nodules     Cardiovascular: Normal rate, regular rhythm and normal heart sounds.    No murmur heard.  Pulmonary/Chest: Effort normal and breath sounds normal. No respiratory distress.   Abdominal: Soft. Bowel sounds are normal. He exhibits no mass. There is no tenderness.   Lymphadenopathy:     He has no cervical adenopathy.   Neurological: He is alert and oriented to person, place, and time. No cranial nerve deficit.   Skin: Skin is warm and dry. He is not diaphoretic. No erythema.   Acanthosis nigricans   Psychiatric: He has a normal mood and affect. His behavior is normal.   Vitals reviewed.    LABS/IMAGING: outside records reviewed and summarized in HPI    ASSESSMENT/PLAN:  1) left adrenal nodule:  - 3.0 X 4.3 cm: incidentally found on imaging of the abdomen. CT with contrast did not show washout of > 50%, but margin is regular.  - discussed with patient that he will need to do testing to determine if the nodule is functioning  - will have him complete an 8AM aldosterone, renin level, fractionated plasma metanephrines, and renal fxn panel  - will also have him complete a 1 mg dexamethasone suppression test after the above testing has been completed.    2) essential HTN: uncontrolled  - encouraged pt to take his BP medication daily  - reviewed with patient that his BP medication does not have to be held for lab testing    F/U to be determined after lab results reviewed

## 2017-10-15 PROBLEM — E27.8 ADRENAL NODULE (HCC): Status: ACTIVE | Noted: 2017-10-15

## 2017-10-15 PROBLEM — E27.9 ADRENAL NODULE: Status: ACTIVE | Noted: 2017-10-15

## 2017-11-03 ENCOUNTER — LAB (OUTPATIENT)
Dept: INTERNAL MEDICINE | Facility: CLINIC | Age: 47
End: 2017-11-03

## 2017-11-03 DIAGNOSIS — E27.8 ADRENAL NODULE (HCC): ICD-10-CM

## 2017-11-03 LAB
ALBUMIN SERPL-MCNC: 4 G/DL (ref 3.2–4.8)
ANION GAP SERPL CALCULATED.3IONS-SCNC: 13 MMOL/L (ref 3–11)
BUN BLD-MCNC: 11 MG/DL (ref 9–23)
BUN/CREAT SERPL: 12.2 (ref 7–25)
CALCIUM SPEC-SCNC: 9.3 MG/DL (ref 8.7–10.4)
CHLORIDE SERPL-SCNC: 104 MMOL/L (ref 99–109)
CO2 SERPL-SCNC: 23 MMOL/L (ref 20–31)
CREAT BLD-MCNC: 0.9 MG/DL (ref 0.6–1.3)
GFR SERPL CREATININE-BSD FRML MDRD: 90 ML/MIN/1.73
GLUCOSE BLD-MCNC: 124 MG/DL (ref 70–100)
PHOSPHATE SERPL-MCNC: 3.5 MG/DL (ref 2.4–5.1)
POTASSIUM BLD-SCNC: 4.2 MMOL/L (ref 3.5–5.5)
SODIUM BLD-SCNC: 140 MMOL/L (ref 132–146)

## 2017-11-03 PROCEDURE — 83835 ASSAY OF METANEPHRINES: CPT | Performed by: INTERNAL MEDICINE

## 2017-11-03 PROCEDURE — 84244 ASSAY OF RENIN: CPT | Performed by: INTERNAL MEDICINE

## 2017-11-03 PROCEDURE — 80069 RENAL FUNCTION PANEL: CPT | Performed by: INTERNAL MEDICINE

## 2017-11-07 LAB
METANEPHRINE, PL: <10 PG/ML (ref 0–62)
NORMETANEPHRINE, PL: 101 PG/ML (ref 0–145)
RENIN PLAS-CCNC: 0.19 NG/ML/HR (ref 0.17–5.38)

## 2017-11-09 LAB — ALDOST SERPL-MCNC: 13.1 NG/DL (ref 0–30)

## 2017-11-22 DIAGNOSIS — E27.8 ADRENAL NODULE (HCC): Primary | ICD-10-CM

## 2018-03-01 ENCOUNTER — TELEPHONE (OUTPATIENT)
Dept: INTERNAL MEDICINE | Facility: CLINIC | Age: 48
End: 2018-03-01

## 2018-03-01 NOTE — TELEPHONE ENCOUNTER
Spoke to patient. His insurance is restricting repeat lab evaluation for his adrenal adenoma. Will repeat in 11/2018 which will be a 1 yr follow-up.   Roam Abbott MD

## 2018-03-01 NOTE — TELEPHONE ENCOUNTER
PT WOULD LIKE YOU TO BE INFORMED THAT HIS INSURANCE HAS SENT HIM A LETTER STATING THAT THEY WILL NOT COVER HIS 2ND ROUND OF LABS BECAUSE THEY THINK IT IS EXPERIMENTAL.   HE WOULD LIKE TO KNOW WHAT THE NEXT STEP IS.

## 2018-05-30 ENCOUNTER — APPOINTMENT (OUTPATIENT)
Dept: CT IMAGING | Facility: HOSPITAL | Age: 48
End: 2018-05-30

## 2018-05-30 ENCOUNTER — APPOINTMENT (OUTPATIENT)
Dept: GENERAL RADIOLOGY | Facility: HOSPITAL | Age: 48
End: 2018-05-30

## 2018-05-30 ENCOUNTER — HOSPITAL ENCOUNTER (EMERGENCY)
Facility: HOSPITAL | Age: 48
Discharge: HOME | End: 2018-05-31
Attending: EMERGENCY MEDICINE | Admitting: EMERGENCY MEDICINE

## 2018-05-30 DIAGNOSIS — R10.11 RIGHT UPPER QUADRANT ABDOMINAL PAIN: ICD-10-CM

## 2018-05-30 DIAGNOSIS — R31.9 HEMATURIA, UNSPECIFIED TYPE: ICD-10-CM

## 2018-05-30 DIAGNOSIS — R73.9 HYPERGLYCEMIA: Primary | ICD-10-CM

## 2018-05-30 DIAGNOSIS — E27.8 LEFT ADRENAL MASS (HCC): ICD-10-CM

## 2018-05-30 LAB
ALBUMIN SERPL-MCNC: 3.9 G/DL (ref 3.2–4.8)
ALBUMIN/GLOB SERPL: 1.1 G/DL (ref 1.5–2.5)
ALP SERPL-CCNC: 142 U/L (ref 25–100)
ALT SERPL W P-5'-P-CCNC: 95 U/L (ref 7–40)
ANION GAP SERPL CALCULATED.3IONS-SCNC: 9 MMOL/L (ref 3–11)
AST SERPL-CCNC: 40 U/L (ref 0–33)
BACTERIA UR QL AUTO: ABNORMAL /HPF
BASOPHILS # BLD AUTO: 0.03 10*3/MM3 (ref 0–0.2)
BASOPHILS NFR BLD AUTO: 0.3 % (ref 0–1)
BILIRUB SERPL-MCNC: 0.6 MG/DL (ref 0.3–1.2)
BILIRUB UR QL STRIP: NEGATIVE
BUN BLD-MCNC: 15 MG/DL (ref 9–23)
BUN/CREAT SERPL: 15 (ref 7–25)
CALCIUM SPEC-SCNC: 9 MG/DL (ref 8.7–10.4)
CHLORIDE SERPL-SCNC: 99 MMOL/L (ref 99–109)
CLARITY UR: CLEAR
CO2 SERPL-SCNC: 25 MMOL/L (ref 20–31)
COLOR UR: YELLOW
CREAT BLD-MCNC: 1 MG/DL (ref 0.6–1.3)
DEPRECATED RDW RBC AUTO: 41.1 FL (ref 37–54)
EOSINOPHIL # BLD AUTO: 0.07 10*3/MM3 (ref 0–0.3)
EOSINOPHIL NFR BLD AUTO: 0.8 % (ref 0–3)
ERYTHROCYTE [DISTWIDTH] IN BLOOD BY AUTOMATED COUNT: 12.5 % (ref 11.3–14.5)
GFR SERPL CREATININE-BSD FRML MDRD: 79 ML/MIN/1.73
GLOBULIN UR ELPH-MCNC: 3.5 GM/DL
GLUCOSE BLD-MCNC: 450 MG/DL (ref 70–100)
GLUCOSE UR STRIP-MCNC: ABNORMAL MG/DL
HCT VFR BLD AUTO: 42.2 % (ref 38.9–50.9)
HGB BLD-MCNC: 15 G/DL (ref 13.1–17.5)
HGB UR QL STRIP.AUTO: ABNORMAL
HYALINE CASTS UR QL AUTO: ABNORMAL /LPF
IMM GRANULOCYTES # BLD: 0.02 10*3/MM3 (ref 0–0.03)
IMM GRANULOCYTES NFR BLD: 0.2 % (ref 0–0.6)
KETONES UR QL STRIP: ABNORMAL
LEUKOCYTE ESTERASE UR QL STRIP.AUTO: NEGATIVE
LIPASE SERPL-CCNC: 43 U/L (ref 6–51)
LYMPHOCYTES # BLD AUTO: 1.92 10*3/MM3 (ref 0.6–4.8)
LYMPHOCYTES NFR BLD AUTO: 22 % (ref 24–44)
MCH RBC QN AUTO: 32.1 PG (ref 27–31)
MCHC RBC AUTO-ENTMCNC: 35.5 G/DL (ref 32–36)
MCV RBC AUTO: 90.4 FL (ref 80–99)
MONOCYTES # BLD AUTO: 1.31 10*3/MM3 (ref 0–1)
MONOCYTES NFR BLD AUTO: 15 % (ref 0–12)
NEUTROPHILS # BLD AUTO: 5.36 10*3/MM3 (ref 1.5–8.3)
NEUTROPHILS NFR BLD AUTO: 61.7 % (ref 41–71)
NITRITE UR QL STRIP: NEGATIVE
PH UR STRIP.AUTO: 6 [PH] (ref 5–8)
PLATELET # BLD AUTO: 216 10*3/MM3 (ref 150–450)
PMV BLD AUTO: 11.1 FL (ref 6–12)
POTASSIUM BLD-SCNC: 4.2 MMOL/L (ref 3.5–5.5)
PROT SERPL-MCNC: 7.4 G/DL (ref 5.7–8.2)
PROT UR QL STRIP: NEGATIVE
RBC # BLD AUTO: 4.67 10*6/MM3 (ref 4.2–5.76)
RBC # UR: ABNORMAL /HPF
REF LAB TEST METHOD: ABNORMAL
SODIUM BLD-SCNC: 133 MMOL/L (ref 132–146)
SP GR UR STRIP: 1.04 (ref 1–1.03)
SQUAMOUS #/AREA URNS HPF: ABNORMAL /HPF
UROBILINOGEN UR QL STRIP: ABNORMAL
WBC NRBC COR # BLD: 8.71 10*3/MM3 (ref 3.5–10.8)
WBC UR QL AUTO: ABNORMAL /HPF

## 2018-05-30 PROCEDURE — 83690 ASSAY OF LIPASE: CPT | Performed by: EMERGENCY MEDICINE

## 2018-05-30 PROCEDURE — 96374 THER/PROPH/DIAG INJ IV PUSH: CPT

## 2018-05-30 PROCEDURE — 99284 EMERGENCY DEPT VISIT MOD MDM: CPT

## 2018-05-30 PROCEDURE — 71045 X-RAY EXAM CHEST 1 VIEW: CPT

## 2018-05-30 PROCEDURE — 25010000002 IOPAMIDOL 61 % SOLUTION: Performed by: EMERGENCY MEDICINE

## 2018-05-30 PROCEDURE — 85025 COMPLETE CBC W/AUTO DIFF WBC: CPT | Performed by: EMERGENCY MEDICINE

## 2018-05-30 PROCEDURE — 80053 COMPREHEN METABOLIC PANEL: CPT | Performed by: EMERGENCY MEDICINE

## 2018-05-30 PROCEDURE — 96375 TX/PRO/DX INJ NEW DRUG ADDON: CPT

## 2018-05-30 PROCEDURE — 74177 CT ABD & PELVIS W/CONTRAST: CPT

## 2018-05-30 PROCEDURE — 25010000002 ONDANSETRON PER 1 MG: Performed by: EMERGENCY MEDICINE

## 2018-05-30 PROCEDURE — 25010000002 HYDROMORPHONE PER 4 MG: Performed by: EMERGENCY MEDICINE

## 2018-05-30 PROCEDURE — 81001 URINALYSIS AUTO W/SCOPE: CPT | Performed by: EMERGENCY MEDICINE

## 2018-05-30 RX ORDER — AMOXICILLIN AND CLAVULANATE POTASSIUM 875; 125 MG/1; MG/1
1 TABLET, FILM COATED ORAL 2 TIMES DAILY
Status: ON HOLD | COMMUNITY
End: 2018-06-11

## 2018-05-30 RX ORDER — HYDROMORPHONE HYDROCHLORIDE 1 MG/ML
1 INJECTION, SOLUTION INTRAMUSCULAR; INTRAVENOUS; SUBCUTANEOUS ONCE
Status: COMPLETED | OUTPATIENT
Start: 2018-05-30 | End: 2018-05-30

## 2018-05-30 RX ORDER — SODIUM CHLORIDE 0.9 % (FLUSH) 0.9 %
10 SYRINGE (ML) INJECTION AS NEEDED
Status: DISCONTINUED | OUTPATIENT
Start: 2018-05-30 | End: 2018-05-31 | Stop reason: HOSPADM

## 2018-05-30 RX ORDER — ONDANSETRON 2 MG/ML
4 INJECTION INTRAMUSCULAR; INTRAVENOUS ONCE
Status: COMPLETED | OUTPATIENT
Start: 2018-05-30 | End: 2018-05-30

## 2018-05-30 RX ADMIN — SODIUM CHLORIDE 1000 ML: 9 INJECTION, SOLUTION INTRAVENOUS at 21:22

## 2018-05-30 RX ADMIN — ONDANSETRON 4 MG: 2 INJECTION INTRAMUSCULAR; INTRAVENOUS at 21:22

## 2018-05-30 RX ADMIN — SODIUM CHLORIDE 1000 ML: 9 INJECTION, SOLUTION INTRAVENOUS at 22:57

## 2018-05-30 RX ADMIN — IOPAMIDOL 95 ML: 612 INJECTION, SOLUTION INTRAVENOUS at 21:55

## 2018-05-30 RX ADMIN — HYDROMORPHONE HYDROCHLORIDE 1 MG: 1 INJECTION, SOLUTION INTRAMUSCULAR; INTRAVENOUS; SUBCUTANEOUS at 21:22

## 2018-05-31 ENCOUNTER — TELEPHONE (OUTPATIENT)
Dept: ONCOLOGY | Facility: CLINIC | Age: 48
End: 2018-05-31

## 2018-05-31 VITALS
WEIGHT: 235 LBS | OXYGEN SATURATION: 97 % | HEIGHT: 68 IN | RESPIRATION RATE: 16 BRPM | HEART RATE: 61 BPM | BODY MASS INDEX: 35.61 KG/M2 | DIASTOLIC BLOOD PRESSURE: 106 MMHG | SYSTOLIC BLOOD PRESSURE: 141 MMHG | TEMPERATURE: 98.6 F

## 2018-05-31 DIAGNOSIS — D49.7 ADRENAL TUMOR: Primary | ICD-10-CM

## 2018-05-31 NOTE — TELEPHONE ENCOUNTER
Called patient after hearing about him from the ER last night. Patient reports he is having more abdominal pain.  I reviewed his scans.  Will set him up to see Dr. Garza for further evaluation.  Patient is aware.

## 2018-06-06 ENCOUNTER — LAB (OUTPATIENT)
Dept: LAB | Facility: HOSPITAL | Age: 48
End: 2018-06-06

## 2018-06-06 ENCOUNTER — DOCUMENTATION (OUTPATIENT)
Dept: OTHER | Facility: HOSPITAL | Age: 48
End: 2018-06-06

## 2018-06-06 DIAGNOSIS — I15.2 ADRENAL HYPERTENSION (HCC): Primary | ICD-10-CM

## 2018-06-06 DIAGNOSIS — E27.9 ADRENAL HYPERTENSION (HCC): Primary | ICD-10-CM

## 2018-06-06 LAB
ALBUMIN SERPL-MCNC: 4.01 G/DL (ref 3.2–4.8)
ALBUMIN/GLOB SERPL: 1.2 G/DL (ref 1.5–2.5)
ALP SERPL-CCNC: 147 U/L (ref 25–100)
ALT SERPL W P-5'-P-CCNC: 182 U/L (ref 7–40)
ANION GAP SERPL CALCULATED.3IONS-SCNC: 6 MMOL/L (ref 3–11)
AST SERPL-CCNC: 103 U/L (ref 0–33)
BILIRUB SERPL-MCNC: 0.8 MG/DL (ref 0.3–1.2)
BUN BLD-MCNC: 13 MG/DL (ref 9–23)
BUN/CREAT SERPL: 15.7 (ref 7–25)
CALCIUM SPEC-SCNC: 8.7 MG/DL (ref 8.7–10.4)
CHLORIDE SERPL-SCNC: 103 MMOL/L (ref 99–109)
CO2 SERPL-SCNC: 28 MMOL/L (ref 20–31)
CREAT BLD-MCNC: 0.83 MG/DL (ref 0.6–1.3)
ERYTHROCYTE [DISTWIDTH] IN BLOOD BY AUTOMATED COUNT: 12.7 % (ref 11.3–14.5)
GFR SERPL CREATININE-BSD FRML MDRD: 98 ML/MIN/1.73
GLOBULIN UR ELPH-MCNC: 3.3 GM/DL
GLUCOSE BLD-MCNC: 305 MG/DL (ref 70–100)
HCT VFR BLD AUTO: 44.4 % (ref 38.9–50.9)
HGB BLD-MCNC: 14.4 G/DL (ref 13.1–17.5)
INR PPP: 1.05 (ref 0.91–1.09)
LYMPHOCYTES # BLD AUTO: 1.9 10*3/MM3 (ref 0.6–4.8)
LYMPHOCYTES NFR BLD AUTO: 27.4 % (ref 24–44)
MCH RBC QN AUTO: 30.9 PG (ref 27–31)
MCHC RBC AUTO-ENTMCNC: 32.5 G/DL (ref 32–36)
MCV RBC AUTO: 95 FL (ref 80–99)
MONOCYTES # BLD AUTO: 0.4 10*3/MM3 (ref 0–1)
MONOCYTES NFR BLD AUTO: 6.1 % (ref 0–12)
NEUTROPHILS # BLD AUTO: 4.5 10*3/MM3 (ref 1.5–8.3)
NEUTROPHILS NFR BLD AUTO: 66.5 % (ref 41–71)
PLATELET # BLD AUTO: 297 10*3/MM3 (ref 150–450)
PMV BLD AUTO: 8.5 FL (ref 6–12)
POTASSIUM BLD-SCNC: 4.2 MMOL/L (ref 3.5–5.5)
PROT SERPL-MCNC: 7.3 G/DL (ref 5.7–8.2)
PROTHROMBIN TIME: 11 SECONDS (ref 9.6–11.5)
RBC # BLD AUTO: 4.67 10*6/MM3 (ref 4.2–5.76)
SODIUM BLD-SCNC: 137 MMOL/L (ref 132–146)
WBC NRBC COR # BLD: 6.8 10*3/MM3 (ref 3.5–10.8)

## 2018-06-06 PROCEDURE — 85610 PROTHROMBIN TIME: CPT

## 2018-06-06 PROCEDURE — 80053 COMPREHEN METABOLIC PANEL: CPT

## 2018-06-06 PROCEDURE — 85025 COMPLETE CBC W/AUTO DIFF WBC: CPT

## 2018-06-06 PROCEDURE — 36415 COLL VENOUS BLD VENIPUNCTURE: CPT

## 2018-06-06 NOTE — PROGRESS NOTES
Pt seen in clinic today with Dr. Simon regarding an adrenal mass. Pt states he has experienced a 20 pound weight loss over 6 weeks. A referral to [facility] has been made for him. Pt given contact information for me and he will call with any new questions or concerns. I will continue to follow pt. SC

## 2018-06-07 ENCOUNTER — TRANSCRIBE ORDERS (OUTPATIENT)
Dept: ADMINISTRATIVE | Facility: HOSPITAL | Age: 48
End: 2018-06-07

## 2018-06-07 DIAGNOSIS — C64.2 MALIGNANT NEOPLASM OF KIDNEY, LEFT (HCC): Primary | ICD-10-CM

## 2018-06-07 DIAGNOSIS — E27.8 ADRENAL MASS (HCC): Primary | ICD-10-CM

## 2018-06-08 ENCOUNTER — TRANSCRIBE ORDERS (OUTPATIENT)
Dept: ADMINISTRATIVE | Facility: HOSPITAL | Age: 48
End: 2018-06-08

## 2018-06-08 DIAGNOSIS — N28.89 KIDNEY MASS: Primary | ICD-10-CM

## 2018-06-11 ENCOUNTER — APPOINTMENT (OUTPATIENT)
Dept: CT IMAGING | Facility: HOSPITAL | Age: 48
End: 2018-06-11

## 2018-06-11 ENCOUNTER — HOSPITAL ENCOUNTER (INPATIENT)
Facility: HOSPITAL | Age: 48
LOS: 2 days | Discharge: HOME | End: 2018-06-13
Attending: EMERGENCY MEDICINE | Admitting: INTERNAL MEDICINE

## 2018-06-11 ENCOUNTER — APPOINTMENT (OUTPATIENT)
Dept: GENERAL RADIOLOGY | Facility: HOSPITAL | Age: 48
End: 2018-06-11

## 2018-06-11 ENCOUNTER — APPOINTMENT (OUTPATIENT)
Dept: CARDIOLOGY | Facility: HOSPITAL | Age: 48
End: 2018-06-11

## 2018-06-11 DIAGNOSIS — R31.0 GROSS HEMATURIA: ICD-10-CM

## 2018-06-11 DIAGNOSIS — R10.9 ACUTE ABDOMINAL PAIN: ICD-10-CM

## 2018-06-11 DIAGNOSIS — N28.89 LEFT RENAL MASS: Primary | ICD-10-CM

## 2018-06-11 PROBLEM — E11.9 TYPE 2 DIABETES MELLITUS WITHOUT COMPLICATION: Status: ACTIVE | Noted: 2018-06-11

## 2018-06-11 LAB
ALBUMIN SERPL-MCNC: 3.8 G/DL (ref 3.2–4.8)
ALBUMIN/GLOB SERPL: 1.2 G/DL (ref 1.5–2.5)
ALP SERPL-CCNC: 147 U/L (ref 25–100)
ALT SERPL W P-5'-P-CCNC: 160 U/L (ref 7–40)
ANION GAP SERPL CALCULATED.3IONS-SCNC: 10 MMOL/L (ref 3–11)
AST SERPL-CCNC: 48 U/L (ref 0–33)
BACTERIA UR QL AUTO: ABNORMAL /HPF
BASOPHILS # BLD AUTO: 0.02 10*3/MM3 (ref 0–0.2)
BASOPHILS NFR BLD AUTO: 0.3 % (ref 0–1)
BH CV LOWER VASCULAR LEFT COMMON FEMORAL AUGMENT: NORMAL
BH CV LOWER VASCULAR LEFT COMMON FEMORAL COMPETENT: NORMAL
BH CV LOWER VASCULAR LEFT COMMON FEMORAL COMPRESS: NORMAL
BH CV LOWER VASCULAR LEFT COMMON FEMORAL PHASIC: NORMAL
BH CV LOWER VASCULAR LEFT COMMON FEMORAL SPONT: NORMAL
BH CV LOWER VASCULAR LEFT DISTAL FEMORAL COMPRESS: NORMAL
BH CV LOWER VASCULAR LEFT GASTRONEMIUS COMPRESS: NORMAL
BH CV LOWER VASCULAR LEFT GREATER SAPH AK COMPRESS: NORMAL
BH CV LOWER VASCULAR LEFT GREATER SAPH BK COMPRESS: NORMAL
BH CV LOWER VASCULAR LEFT MID FEMORAL AUGMENT: NORMAL
BH CV LOWER VASCULAR LEFT MID FEMORAL COMPETENT: NORMAL
BH CV LOWER VASCULAR LEFT MID FEMORAL COMPRESS: NORMAL
BH CV LOWER VASCULAR LEFT MID FEMORAL PHASIC: NORMAL
BH CV LOWER VASCULAR LEFT MID FEMORAL SPONT: NORMAL
BH CV LOWER VASCULAR LEFT PERONEAL COMPRESS: NORMAL
BH CV LOWER VASCULAR LEFT POPLITEAL AUGMENT: NORMAL
BH CV LOWER VASCULAR LEFT POPLITEAL COMPETENT: NORMAL
BH CV LOWER VASCULAR LEFT POPLITEAL COMPRESS: NORMAL
BH CV LOWER VASCULAR LEFT POPLITEAL PHASIC: NORMAL
BH CV LOWER VASCULAR LEFT POPLITEAL SPONT: NORMAL
BH CV LOWER VASCULAR LEFT POSTERIOR TIBIAL COMPRESS: NORMAL
BH CV LOWER VASCULAR LEFT PROXIMAL FEMORAL COMPRESS: NORMAL
BH CV LOWER VASCULAR LEFT SAPHENOFEMORAL JUNCTION AUGMENT: NORMAL
BH CV LOWER VASCULAR LEFT SAPHENOFEMORAL JUNCTION COMPETENT: NORMAL
BH CV LOWER VASCULAR LEFT SAPHENOFEMORAL JUNCTION COMPRESS: NORMAL
BH CV LOWER VASCULAR LEFT SAPHENOFEMORAL JUNCTION PHASIC: NORMAL
BH CV LOWER VASCULAR LEFT SAPHENOFEMORAL JUNCTION SPONT: NORMAL
BH CV LOWER VASCULAR RIGHT COMMON FEMORAL AUGMENT: NORMAL
BH CV LOWER VASCULAR RIGHT COMMON FEMORAL COMPETENT: NORMAL
BH CV LOWER VASCULAR RIGHT COMMON FEMORAL COMPRESS: NORMAL
BH CV LOWER VASCULAR RIGHT COMMON FEMORAL PHASIC: NORMAL
BH CV LOWER VASCULAR RIGHT COMMON FEMORAL SPONT: NORMAL
BH CV LOWER VASCULAR RIGHT DISTAL FEMORAL COMPRESS: NORMAL
BH CV LOWER VASCULAR RIGHT GASTRONEMIUS COMPRESS: NORMAL
BH CV LOWER VASCULAR RIGHT GREATER SAPH AK COMPRESS: NORMAL
BH CV LOWER VASCULAR RIGHT MID FEMORAL AUGMENT: NORMAL
BH CV LOWER VASCULAR RIGHT MID FEMORAL COMPETENT: NORMAL
BH CV LOWER VASCULAR RIGHT MID FEMORAL COMPRESS: NORMAL
BH CV LOWER VASCULAR RIGHT MID FEMORAL PHASIC: NORMAL
BH CV LOWER VASCULAR RIGHT MID FEMORAL SPONT: NORMAL
BH CV LOWER VASCULAR RIGHT PERONEAL COMPRESS: NORMAL
BH CV LOWER VASCULAR RIGHT POPLITEAL AUGMENT: NORMAL
BH CV LOWER VASCULAR RIGHT POPLITEAL COMPETENT: NORMAL
BH CV LOWER VASCULAR RIGHT POPLITEAL COMPRESS: NORMAL
BH CV LOWER VASCULAR RIGHT POPLITEAL PHASIC: NORMAL
BH CV LOWER VASCULAR RIGHT POPLITEAL SPONT: NORMAL
BH CV LOWER VASCULAR RIGHT POSTERIOR TIBIAL COMPRESS: NORMAL
BH CV LOWER VASCULAR RIGHT PROXIMAL FEMORAL COMPRESS: NORMAL
BH CV LOWER VASCULAR RIGHT SAPHENOFEMORAL JUNCTION AUGMENT: NORMAL
BH CV LOWER VASCULAR RIGHT SAPHENOFEMORAL JUNCTION COMPETENT: NORMAL
BH CV LOWER VASCULAR RIGHT SAPHENOFEMORAL JUNCTION COMPRESS: NORMAL
BH CV LOWER VASCULAR RIGHT SAPHENOFEMORAL JUNCTION PHASIC: NORMAL
BH CV LOWER VASCULAR RIGHT SAPHENOFEMORAL JUNCTION SPONT: NORMAL
BILIRUB SERPL-MCNC: 0.7 MG/DL (ref 0.3–1.2)
BILIRUB UR QL STRIP: NEGATIVE
BUN BLD-MCNC: 14 MG/DL (ref 9–23)
BUN/CREAT SERPL: 14.9 (ref 7–25)
CALCIUM SPEC-SCNC: 8.7 MG/DL (ref 8.7–10.4)
CHLORIDE SERPL-SCNC: 100 MMOL/L (ref 99–109)
CK SERPL-CCNC: 146 U/L (ref 26–174)
CLARITY UR: ABNORMAL
CO2 SERPL-SCNC: 26 MMOL/L (ref 20–31)
COLOR UR: ABNORMAL
CREAT BLD-MCNC: 0.94 MG/DL (ref 0.6–1.3)
DEPRECATED RDW RBC AUTO: 41.2 FL (ref 37–54)
EOSINOPHIL # BLD AUTO: 0.09 10*3/MM3 (ref 0–0.3)
EOSINOPHIL NFR BLD AUTO: 1.3 % (ref 0–3)
ERYTHROCYTE [DISTWIDTH] IN BLOOD BY AUTOMATED COUNT: 12.3 % (ref 11.3–14.5)
GFR SERPL CREATININE-BSD FRML MDRD: 85 ML/MIN/1.73
GLOBULIN UR ELPH-MCNC: 3.3 GM/DL
GLUCOSE BLD-MCNC: 375 MG/DL (ref 70–100)
GLUCOSE BLDC GLUCOMTR-MCNC: 229 MG/DL (ref 70–130)
GLUCOSE BLDC GLUCOMTR-MCNC: 412 MG/DL (ref 70–130)
GLUCOSE BLDC GLUCOMTR-MCNC: 496 MG/DL (ref 70–130)
GLUCOSE UR STRIP-MCNC: ABNORMAL MG/DL
HBA1C MFR BLD: 11.9 % (ref 4.8–5.6)
HCT VFR BLD AUTO: 40.7 % (ref 38.9–50.9)
HCT VFR BLD AUTO: 40.9 % (ref 38.9–50.9)
HGB BLD-MCNC: 14.2 G/DL (ref 13.1–17.5)
HGB BLD-MCNC: 14.3 G/DL (ref 13.1–17.5)
HGB UR QL STRIP.AUTO: ABNORMAL
HYALINE CASTS UR QL AUTO: ABNORMAL /LPF
IMM GRANULOCYTES # BLD: 0.02 10*3/MM3 (ref 0–0.03)
IMM GRANULOCYTES NFR BLD: 0.3 % (ref 0–0.6)
KETONES UR QL STRIP: NEGATIVE
LEUKOCYTE ESTERASE UR QL STRIP.AUTO: ABNORMAL
LYMPHOCYTES # BLD AUTO: 1.59 10*3/MM3 (ref 0.6–4.8)
LYMPHOCYTES NFR BLD AUTO: 23.5 % (ref 24–44)
MCH RBC QN AUTO: 31.8 PG (ref 27–31)
MCHC RBC AUTO-ENTMCNC: 35.1 G/DL (ref 32–36)
MCV RBC AUTO: 90.6 FL (ref 80–99)
MONOCYTES # BLD AUTO: 0.63 10*3/MM3 (ref 0–1)
MONOCYTES NFR BLD AUTO: 9.3 % (ref 0–12)
NEUTROPHILS # BLD AUTO: 4.41 10*3/MM3 (ref 1.5–8.3)
NEUTROPHILS NFR BLD AUTO: 65.3 % (ref 41–71)
NITRITE UR QL STRIP: NEGATIVE
PH UR STRIP.AUTO: <=5 [PH] (ref 5–8)
PLATELET # BLD AUTO: 282 10*3/MM3 (ref 150–450)
PMV BLD AUTO: 10.4 FL (ref 6–12)
POTASSIUM BLD-SCNC: 4.1 MMOL/L (ref 3.5–5.5)
PROT SERPL-MCNC: 7.1 G/DL (ref 5.7–8.2)
PROT UR QL STRIP: ABNORMAL
RBC # BLD AUTO: 4.49 10*6/MM3 (ref 4.2–5.76)
RBC # UR: ABNORMAL /HPF
REF LAB TEST METHOD: ABNORMAL
SODIUM BLD-SCNC: 136 MMOL/L (ref 132–146)
SP GR UR STRIP: >=1.03 (ref 1–1.03)
SQUAMOUS #/AREA URNS HPF: ABNORMAL /HPF
UROBILINOGEN UR QL STRIP: ABNORMAL
WBC NRBC COR # BLD: 6.76 10*3/MM3 (ref 3.5–10.8)
WBC UR QL AUTO: ABNORMAL /HPF

## 2018-06-11 PROCEDURE — 85018 HEMOGLOBIN: CPT | Performed by: NURSE PRACTITIONER

## 2018-06-11 PROCEDURE — 74178 CT ABD&PLV WO CNTR FLWD CNTR: CPT

## 2018-06-11 PROCEDURE — 87086 URINE CULTURE/COLONY COUNT: CPT | Performed by: NURSE PRACTITIONER

## 2018-06-11 PROCEDURE — G0378 HOSPITAL OBSERVATION PER HR: HCPCS

## 2018-06-11 PROCEDURE — 93970 EXTREMITY STUDY: CPT | Performed by: INTERNAL MEDICINE

## 2018-06-11 PROCEDURE — 87077 CULTURE AEROBIC IDENTIFY: CPT | Performed by: NURSE PRACTITIONER

## 2018-06-11 PROCEDURE — 63710000001 INSULIN LISPRO (HUMAN) PER 5 UNITS: Performed by: NURSE PRACTITIONER

## 2018-06-11 PROCEDURE — 87186 SC STD MICRODIL/AGAR DIL: CPT | Performed by: NURSE PRACTITIONER

## 2018-06-11 PROCEDURE — 71275 CT ANGIOGRAPHY CHEST: CPT

## 2018-06-11 PROCEDURE — 99223 1ST HOSP IP/OBS HIGH 75: CPT | Performed by: FAMILY MEDICINE

## 2018-06-11 PROCEDURE — 81001 URINALYSIS AUTO W/SCOPE: CPT | Performed by: NURSE PRACTITIONER

## 2018-06-11 PROCEDURE — 93970 EXTREMITY STUDY: CPT

## 2018-06-11 PROCEDURE — 71046 X-RAY EXAM CHEST 2 VIEWS: CPT

## 2018-06-11 PROCEDURE — 82962 GLUCOSE BLOOD TEST: CPT

## 2018-06-11 PROCEDURE — 85025 COMPLETE CBC W/AUTO DIFF WBC: CPT | Performed by: NURSE PRACTITIONER

## 2018-06-11 PROCEDURE — 83036 HEMOGLOBIN GLYCOSYLATED A1C: CPT | Performed by: NURSE PRACTITIONER

## 2018-06-11 PROCEDURE — 99284 EMERGENCY DEPT VISIT MOD MDM: CPT

## 2018-06-11 PROCEDURE — 80053 COMPREHEN METABOLIC PANEL: CPT | Performed by: NURSE PRACTITIONER

## 2018-06-11 PROCEDURE — 0 IOPAMIDOL PER 1 ML: Performed by: EMERGENCY MEDICINE

## 2018-06-11 PROCEDURE — 87147 CULTURE TYPE IMMUNOLOGIC: CPT | Performed by: NURSE PRACTITIONER

## 2018-06-11 PROCEDURE — 82550 ASSAY OF CK (CPK): CPT | Performed by: NURSE PRACTITIONER

## 2018-06-11 PROCEDURE — 85014 HEMATOCRIT: CPT | Performed by: NURSE PRACTITIONER

## 2018-06-11 RX ORDER — NICOTINE POLACRILEX 4 MG
15 LOZENGE BUCCAL
Status: DISCONTINUED | OUTPATIENT
Start: 2018-06-11 | End: 2018-06-13 | Stop reason: HOSPADM

## 2018-06-11 RX ORDER — DEXTROSE MONOHYDRATE 25 G/50ML
25 INJECTION, SOLUTION INTRAVENOUS
Status: DISCONTINUED | OUTPATIENT
Start: 2018-06-11 | End: 2018-06-13 | Stop reason: HOSPADM

## 2018-06-11 RX ORDER — SODIUM CHLORIDE 0.9 % (FLUSH) 0.9 %
10 SYRINGE (ML) INJECTION AS NEEDED
Status: DISCONTINUED | OUTPATIENT
Start: 2018-06-11 | End: 2018-06-13 | Stop reason: HOSPADM

## 2018-06-11 RX ORDER — ONDANSETRON 4 MG/1
4 TABLET, FILM COATED ORAL EVERY 6 HOURS PRN
Status: DISCONTINUED | OUTPATIENT
Start: 2018-06-11 | End: 2018-06-13 | Stop reason: HOSPADM

## 2018-06-11 RX ORDER — SODIUM CHLORIDE 0.9 % (FLUSH) 0.9 %
1-10 SYRINGE (ML) INJECTION AS NEEDED
Status: DISCONTINUED | OUTPATIENT
Start: 2018-06-11 | End: 2018-06-13 | Stop reason: HOSPADM

## 2018-06-11 RX ORDER — SODIUM CHLORIDE 9 MG/ML
75 INJECTION, SOLUTION INTRAVENOUS CONTINUOUS
Status: DISCONTINUED | OUTPATIENT
Start: 2018-06-11 | End: 2018-06-13 | Stop reason: HOSPADM

## 2018-06-11 RX ORDER — ONDANSETRON 2 MG/ML
4 INJECTION INTRAMUSCULAR; INTRAVENOUS EVERY 6 HOURS PRN
Status: DISCONTINUED | OUTPATIENT
Start: 2018-06-11 | End: 2018-06-13 | Stop reason: HOSPADM

## 2018-06-11 RX ADMIN — INSULIN LISPRO 3 UNITS: 100 INJECTION, SOLUTION INTRAVENOUS; SUBCUTANEOUS at 17:24

## 2018-06-11 RX ADMIN — SODIUM CHLORIDE 75 ML/HR: 9 INJECTION, SOLUTION INTRAVENOUS at 16:30

## 2018-06-11 RX ADMIN — SODIUM CHLORIDE 1000 ML: 9 INJECTION, SOLUTION INTRAVENOUS at 10:18

## 2018-06-11 RX ADMIN — SODIUM CHLORIDE 1000 ML: 9 INJECTION, SOLUTION INTRAVENOUS at 13:11

## 2018-06-11 RX ADMIN — IOPAMIDOL 95 ML: 755 INJECTION, SOLUTION INTRAVENOUS at 12:15

## 2018-06-11 RX ADMIN — INSULIN LISPRO 7 UNITS: 100 INJECTION, SOLUTION INTRAVENOUS; SUBCUTANEOUS at 22:08

## 2018-06-11 NOTE — H&P
[facility]  HISTORY AND PHYSICAL    Patient Name: Sandra Colon  : 1970  MRN: [id number]  Primary Care Physician: No Known Provider    Subjective   Subjective     Chief Complaint:  Hematuria    HPI:  Sandra Colon is a 48 y.o. male with known adrenal mass scheduled for biopsy this week.  Presented to ED and hematuria and some clotting that started last night.  He also reports BLE calf pain for approx 2 weeks.  He received IVF in ED and hematuria has decreased.      Review of Systems   Constitutional: Negative for fever.   HENT: Negative for hearing loss.    Eyes: Negative for visual disturbance.   Respiratory: Negative for cough and shortness of breath.    Cardiovascular: Negative for chest pain.   Gastrointestinal: Positive for abdominal pain. Negative for diarrhea, nausea and vomiting.   Genitourinary: Positive for hematuria. Negative for dysuria.   Musculoskeletal: Negative for back pain.   Skin: Negative for rash.   Neurological: Negative for dizziness and weakness.          Otherwise 10-system ROS reviewed and is negative except as mentioned in the HPI.    Personal History     Past Medical History:   Diagnosis Date   • Adrenal tumor    • Diabetes mellitus    • Fatty liver    • Hyperlipidemia    • Hypertension    • Kidney tumor        Past Surgical History:   Procedure Laterality Date   • CYST REMOVAL Left     on wrist   • KNEE SURGERY Left        Family History: family history includes Cancer in his paternal grandmother; Diabetes in his father and paternal grandfather; No Known Problems in his brother, mother, and sister.     Social History:  reports that he has never smoked. He has never used smokeless tobacco. He reports that he drinks about 1.2 oz of alcohol per week . He reports that he does not use drugs.  Social History     Social History Narrative    Lives in [place] with wife and 2 kids       Medications:  Prescriptions Prior to Admission    Medication Sig Dispense Refill Last Dose   • metFORMIN (GLUCOPHAGE) 500 MG tablet Take 1 tablet by mouth 2 (Two) Times a Day With Meals. 30 tablet 0        No Known Allergies    Objective   Objective     Vital Signs:   Temp:  [98.5 °F (36.9 °C)] 98.5 °F (36.9 °C)  Heart Rate:  [68] 68  Resp:  [18] 18  BP: (127-138)/() 138/90        Physical Exam   Constitutional: No acute distress, awake, alert, multiple family members at bedside  Eyes: PERRLA, sclerae anicteric, no conjunctival injection  HENT: NCAT, mucous membranes moist  Neck: Supple, possible thyromegaly, +anterior cervical lymphadenopathy, trachea midline  Respiratory: Clear to auscultation bilaterally, nonlabored respirations   Cardiovascular: RRR, no murmurs, rubs, or gallops, palpable pedal pulses bilaterally  Gastrointestinal: Positive bowel sounds, soft, nontender, nondistended, right side abdominal tenderness  Musculoskeletal: No bilateral ankle edema, no clubbing or cyanosis to extremities, + Andre's BLE  Psychiatric: Appropriate affect, cooperative  Neurologic: Oriented x 3, strength symmetric in all extremities, speech clear  Skin: No rashes      Results Reviewed:  I have personally reviewed current lab, radiology, and data and agree.      Results from last 7 days  Lab Units 06/11/18  1000 06/06/18  1058   WBC 10*3/mm3 6.76 6.80   HEMOGLOBIN g/dL 14.3 14.4   HEMATOCRIT % 40.7 44.4   PLATELETS 10*3/mm3 282 297   INR   --  1.05       Results from last 7 days  Lab Units 06/11/18  1000   SODIUM mmol/L 136   POTASSIUM mmol/L 4.1   CHLORIDE mmol/L 100   CO2 mmol/L 26.0   BUN mg/dL 14   CREATININE mg/dL 0.94   GLUCOSE mg/dL 375*   CALCIUM mg/dL 8.7   ALT (SGPT) U/L 160*   AST (SGOT) U/L 48*     Estimated Creatinine Clearance: 110 mL/min (by C-G formula based on SCr of 0.94 mg/dL).  Brief Urine Lab Results  (Last result in the past 365 days)      Color   Clarity   Blood   Leuk Est   Nitrite   Protein   CREAT   Urine HCG        06/11/18 1001 Orange(A)  Turbid(A) Large (3+)(A) Moderate (2+)(A) Negative 100 mg/dL (2+)(A)               Imaging Results (last 24 hours)     Procedure Component Value Units Date/Time    CT Angiogram Chest With Contrast [[id number]] Collected:  06/11/18 1314     Updated:  06/11/18 1338    Narrative:       EXAMINATION: CT ANGIOGRAM CHEST W CONTRAST-, CT ABDOMEN AND PELVIS W WO  CONTRAST-06/11/2018:      INDICATION: PE. Left-sided renal mass. Shortness of breath.     TECHNIQUE: Multiple axial CT imaging was obtained of the chest following  the administration of intravenous contrast according to the CT  angiographic protocol. 2-D coronal reformatted images were submitted to  further facilitate diagnostic accuracy and treatment planning. CT scan  was also obtained of the abdomen and pelvis following the administration  of intravenous contrast.  The study is compared to prior study dated  05/30/201     The radiation dose reduction device was turned on for each scan per the  ALARA (As Low as Reasonably Achievable) protocol.     COMPARISON: CT abdomen and pelvis 05/30/2018.     FINDINGS:      CHEST: The lung parenchyma is clear. No parenchymal consolidation,  pulmonary mass or nodule identified. No pleural effusion or  pneumothorax. Degenerative changes seen within the spine. Thyroid is  homogeneous. No mediastinal mass or adenopathy. The cardiac chambers are  within normal limits. No pericardial effusion. No bulky hilar or  axillary lymphadenopathy. No filling defect in the pulmonary arteries to  suggest evidence of pulmonary embolism. Degenerative changes seen within  the spine.     ABDOMEN: There is a solid and cystic mass again seen on the left adrenal  gland which is stable and unchanged when compared to the prior study.  There is a hypoattenuating lesion identified in the superior pole of the  left kidney with a second similar hypoattenuating lesion seen in the  upper pole and medially within the right kidney. The right kidney  lesion  measures largest dimension 2 cm and left renal lesion measures  approximately 3 cm. These lesions are stable when compared to  05/30/2018. There is no evidence of hydronephrosis of the kidneys. No  renal stones identified. The pancreas is homogeneous in appearance. The  liver is homogeneous as well as the spleen. No abdominal or  retroperitoneal lymphadenopathy.     Delayed imaging reveals contrast seen in the renal collecting systems  bilaterally as well as within both ureters and bladder with no evidence  of obstruction. There is persistence seen of the hypoattenuating lesions  within the right and left kidney as above. Underlying malignancy cannot  be excluded at this time.     PELVIS: The pelvic organs are unremarkable. The pelvic portions of the  gastrointestinal tract are within normal limits. No free fluid or free  air. No abnormal mass or fluid collection is identified. Delayed imaging  reveals contrast seen in the renal collecting systems bilaterally and  bladder. The bony structures reveal no evidence of osseous abnormality.       Impression:       1.  No acute intrathoracic abnormality, no PE.  2.  Solid and cystic mass stable on the left adrenal gland.  3.  Two hypoattenuating lesions identified within the superior pole of  the left kidney and second in the medial upper pole of the right kidney.  Malignancy cannot be excluded.     D:  06/11/2018  E:  06/11/2018           This report was finalized on 6/11/2018 1:35 PM by Dr. Maura Oates MD.       CT Abdomen Pelvis With & Without Contrast [[id number]] Collected:  06/11/18 1314     Updated:  06/11/18 1338    Narrative:       EXAMINATION: CT ANGIOGRAM CHEST W CONTRAST-, CT ABDOMEN AND PELVIS W WO  CONTRAST-06/11/2018:      INDICATION: PE. Left-sided renal mass. Shortness of breath.     TECHNIQUE: Multiple axial CT imaging was obtained of the chest following  the administration of intravenous contrast according to the CT  angiographic protocol.  2-D coronal reformatted images were submitted to  further facilitate diagnostic accuracy and treatment planning. CT scan  was also obtained of the abdomen and pelvis following the administration  of intravenous contrast.  The study is compared to prior study dated  05/30/201     The radiation dose reduction device was turned on for each scan per the  ALARA (As Low as Reasonably Achievable) protocol.     COMPARISON: CT abdomen and pelvis 05/30/2018.     FINDINGS:      CHEST: The lung parenchyma is clear. No parenchymal consolidation,  pulmonary mass or nodule identified. No pleural effusion or  pneumothorax. Degenerative changes seen within the spine. Thyroid is  homogeneous. No mediastinal mass or adenopathy. The cardiac chambers are  within normal limits. No pericardial effusion. No bulky hilar or  axillary lymphadenopathy. No filling defect in the pulmonary arteries to  suggest evidence of pulmonary embolism. Degenerative changes seen within  the spine.     ABDOMEN: There is a solid and cystic mass again seen on the left adrenal  gland which is stable and unchanged when compared to the prior study.  There is a hypoattenuating lesion identified in the superior pole of the  left kidney with a second similar hypoattenuating lesion seen in the  upper pole and medially within the right kidney. The right kidney lesion  measures largest dimension 2 cm and left renal lesion measures  approximately 3 cm. These lesions are stable when compared to  05/30/2018. There is no evidence of hydronephrosis of the kidneys. No  renal stones identified. The pancreas is homogeneous in appearance. The  liver is homogeneous as well as the spleen. No abdominal or  retroperitoneal lymphadenopathy.     Delayed imaging reveals contrast seen in the renal collecting systems  bilaterally as well as within both ureters and bladder with no evidence  of obstruction. There is persistence seen of the hypoattenuating lesions  within the right and left  kidney as above. Underlying malignancy cannot  be excluded at this time.     PELVIS: The pelvic organs are unremarkable. The pelvic portions of the  gastrointestinal tract are within normal limits. No free fluid or free  air. No abnormal mass or fluid collection is identified. Delayed imaging  reveals contrast seen in the renal collecting systems bilaterally and  bladder. The bony structures reveal no evidence of osseous abnormality.       Impression:       1.  No acute intrathoracic abnormality, no PE.  2.  Solid and cystic mass stable on the left adrenal gland.  3.  Two hypoattenuating lesions identified within the superior pole of  the left kidney and second in the medial upper pole of the right kidney.  Malignancy cannot be excluded.     D:  06/11/2018  E:  06/11/2018           This report was finalized on 6/11/2018 1:35 PM by Dr. Maura Oates MD.       XR Chest 2 View [[id number]] Collected:  06/11/18 1117     Updated:  06/11/18 1337    Narrative:       EXAMINATION: XR CHEST 2 VW-06/11/2018:      INDICATION: Cough.      COMPARISON: 05/30/2018.     FINDINGS: Two-view chest reveals cardiac and mediastinal silhouettes to  be within normal limits. The lung fields are clear. No focal parenchymal  opacification present.  No pleural effusion or pneumothorax.  Degenerative changes seen within the spine. Pulmonary vascularity is  within normal limits.           Impression:       No acute cardiopulmonary disease.     D:  06/11/2018  E:  06/11/2018     This report was finalized on 6/11/2018 1:35 PM by Dr. Maura Oates MD.           Results for orders placed during the hospital encounter of 03/03/17   Adult Transthoracic Echo Complete    Narrative · Left ventricular function is normal. Estimated EF = 65%.  · Right ventricular cavity is mildly dilated.  · The cardiac valves are anatomically and functionally normal.          Assessment/Plan   Assessment / Plan     Hospital Problem List     * (Principal)Gross  "hematuria    Hypertension    Overview Signed 6/11/2018  3:58 PM by NELLI Spicer     Medications stopped 1 year ago         Hyperlipidemia    Adrenal nodule    Renal mass    Type 2 diabetes mellitus without complication            Assessment & Plan:  --discussed case with Dr. Garza  --plan is for retroperitoneal Bx of the left adrenal tomorrow by IR  --consultation to urology in AM for the new finding of bilateral renal masses  --Will need to consult Dr. Del Angel when tissue diagnosis achieved - she has been involved with his case in the past.  --monitor H&H  --diabetes educator/dietician    DVT prophylaxis:  Mechanical only due to hematuria    CODE STATUS:  Full Code    OBSERVATION status, however if further evaluation or treatment plans warrant, status may change.  Based upon current information, I predict patient's care encounter to be less than or equal to 2 midnights.      Electronically signed by NELLI Spicer, 06/11/18, 4:00 PM.      Brief Attending Admission Attestation     I have seen and examined the patient, performing an independent face-to-face diagnostic evaluation with plan of care reviewed and developed with the advanced practice clinician NELLI Snell.      Brief Summary Statement/HPI:   Sandra Colon is a 48 y.o. male with PMH significant for T2DM, HLD, HTN and left adrenal mass.  He has had follow up for this mass with endocrinology and is awaiting biopsy of the mass per Dr. Garza.  He developed hematuria during the night and this morning notes that his urine was \"pure blood.\"  He has also had BLE calf pain for the last 2 weeks.  Here in the ER, CT chest abdomen and pelvis showed the known left adrenal mass, however there are bilateral kidney lesions noted --  one in the upper pole of the left kidney and another in the medial pole of the right kidney.      Attending Physical Exam:  Constitutional: No acute distress, awake, alert  Eyes: PERRLA, sclerae anicteric, no conjunctival " injection  HENT: NCAT, mucous membranes moist  Neck: Supple, no thyromegaly, trachea midline  Respiratory: Clear to auscultation bilaterally, nonlabored respirations   Cardiovascular: RRR, no murmurs, rubs, or gallops, palpable pedal pulses bilaterally  Gastrointestinal: Positive bowel sounds, soft, nontender, nondistended  Musculoskeletal: No bilateral ankle edema, but calves tender bilaterally, no clubbing or cyanosis to extremities  Psychiatric: Appropriate affect, cooperative  Neurologic: Oriented x 3, strength symmetric in all extremities, Cranial Nerves grossly intact to confrontation, speech clear  Skin: No rashes    Brief Assessment/Plan :  See above for further detailed assessment and plan developed with APC which I have reviewed and/or edited.      Electronically signed by Tegan Baez MD, 06/11/18, 8:41 PM.

## 2018-06-11 NOTE — PLAN OF CARE
Problem: Patient Care Overview  Goal: Plan of Care Review  Outcome: Ongoing (interventions implemented as appropriate)   06/11/18 1529   Coping/Psychosocial   Patient Agreement with Plan of Care agrees   Plan of Care Review   Progress improving   OTHER   Outcome Summary pt has no c/o at this time

## 2018-06-11 NOTE — ED PROVIDER NOTES
"Subjective   Bloody urine this am, then turned dark \"black\" slight abd pain. Being worked up for left renal ca. Has biopsy scheduled wed? As well as CT chest. Managed by Dr. Garza.    Has had a cough and bilateral calf pain.         Blood in Urine   This is a new problem. The current episode started yesterday. The problem is unchanged. He describes the hematuria as gross hematuria. The pain is mild. He describes his urine color as dark red. Irritative symptoms do not include frequency or urgency. Associated symptoms include abdominal pain. Pertinent negatives include no chills, dysuria, fever, flank pain, nausea or vomiting.       Review of Systems   Constitutional: Negative for chills and fever.   Respiratory: Negative for shortness of breath.    Cardiovascular: Negative for chest pain.   Gastrointestinal: Positive for abdominal pain. Negative for anal bleeding, blood in stool, constipation, diarrhea, nausea and vomiting.   Genitourinary: Positive for hematuria. Negative for difficulty urinating, dysuria, flank pain, frequency and urgency.   All other systems reviewed and are negative.      Past Medical History:   Diagnosis Date   • Adrenal tumor    • Diabetes mellitus    • Fatty liver    • Hyperlipidemia    • Hypertension    • Kidney tumor        No Known Allergies    Past Surgical History:   Procedure Laterality Date   • CYST REMOVAL Left     on wrist   • KNEE SURGERY Left        Family History   Problem Relation Age of Onset   • No Known Problems Mother    • Diabetes Father    • No Known Problems Sister    • No Known Problems Brother    • Cancer Paternal Grandmother    • Diabetes Paternal Grandfather        Social History     Social History   • Marital status:      Social History Main Topics   • Smoking status: Never Smoker   • Smokeless tobacco: Never Used   • Alcohol use 1.2 oz/week     2 Standard drinks or equivalent per week      Comment: only on weekends, none in 2 weeks   • Drug use: No   • " Sexual activity: Defer     Other Topics Concern   • Not on file     Social History Narrative    Lives in [place] with wife and 2 kids           Objective   Physical Exam   Constitutional: He is oriented to person, place, and time. He appears well-developed and well-nourished.   HENT:   Head: Normocephalic and atraumatic.   Right Ear: External ear normal.   Left Ear: External ear normal.   Nose: Nose normal.   Mouth/Throat: Oropharynx is clear and moist.   Eyes: Conjunctivae and EOM are normal. Pupils are equal, round, and reactive to light.   Neck: Normal range of motion. Neck supple.   Cardiovascular: Normal rate, regular rhythm, normal heart sounds and intact distal pulses.    Pulmonary/Chest: Effort normal and breath sounds normal.   Abdominal: Soft. Bowel sounds are normal. There is tenderness.   Musculoskeletal: Normal range of motion.        Right lower leg: He exhibits tenderness. He exhibits no swelling and no edema.        Left lower leg: He exhibits tenderness. He exhibits no swelling and no edema.   Neurological: He is alert and oriented to person, place, and time.   Skin: Skin is warm and dry.   Psychiatric: He has a normal mood and affect. His behavior is normal. Judgment normal.       Procedures           ED Course  ED Course as of Jun 14 1104   Mon Jun 11, 2018   1356 Hospitalist paged  [JM]   0549 I spoke with the office of Dr. Garza and advised them of the pts admission and his current symptoms.  [LALITHA]   1400 I spoke with Dr. Baez and she will admit  [LALITHA]   1402 His bilateral doppler results are still pending upon admission  [LALITHA]      ED Course User Index  [LALITHA] NELLI Orourke                  [facility]      Final diagnoses:   Left renal mass   Gross hematuria   Acute abdominal pain            NELLI Orourke  06/11/18 1402       NELLI Orourke  06/14/18 1104

## 2018-06-12 ENCOUNTER — APPOINTMENT (OUTPATIENT)
Dept: CT IMAGING | Facility: HOSPITAL | Age: 48
End: 2018-06-12

## 2018-06-12 ENCOUNTER — APPOINTMENT (OUTPATIENT)
Dept: PET IMAGING | Facility: HOSPITAL | Age: 48
End: 2018-06-12
Attending: SURGERY

## 2018-06-12 LAB
ANION GAP SERPL CALCULATED.3IONS-SCNC: 6 MMOL/L (ref 3–11)
APTT PPP: 27.8 SECONDS (ref 24–31)
BASOPHILS # BLD AUTO: 0.02 10*3/MM3 (ref 0–0.2)
BASOPHILS NFR BLD AUTO: 0.3 % (ref 0–1)
BUN BLD-MCNC: 12 MG/DL (ref 9–23)
BUN/CREAT SERPL: 13.2 (ref 7–25)
CALCIUM SPEC-SCNC: 8.4 MG/DL (ref 8.7–10.4)
CHLORIDE SERPL-SCNC: 103 MMOL/L (ref 99–109)
CO2 SERPL-SCNC: 28 MMOL/L (ref 20–31)
CREAT BLD-MCNC: 0.91 MG/DL (ref 0.6–1.3)
DEPRECATED RDW RBC AUTO: 42.2 FL (ref 37–54)
EOSINOPHIL # BLD AUTO: 0.13 10*3/MM3 (ref 0–0.3)
EOSINOPHIL NFR BLD AUTO: 1.9 % (ref 0–3)
ERYTHROCYTE [DISTWIDTH] IN BLOOD BY AUTOMATED COUNT: 12.5 % (ref 11.3–14.5)
GFR SERPL CREATININE-BSD FRML MDRD: 89 ML/MIN/1.73
GLUCOSE BLD-MCNC: 310 MG/DL (ref 70–100)
GLUCOSE BLDC GLUCOMTR-MCNC: 150 MG/DL (ref 70–130)
GLUCOSE BLDC GLUCOMTR-MCNC: 234 MG/DL (ref 70–130)
GLUCOSE BLDC GLUCOMTR-MCNC: 257 MG/DL (ref 70–130)
GLUCOSE BLDC GLUCOMTR-MCNC: 268 MG/DL (ref 70–130)
GLUCOSE BLDC GLUCOMTR-MCNC: 276 MG/DL (ref 70–130)
HCT VFR BLD AUTO: 39.9 % (ref 38.9–50.9)
HGB BLD-MCNC: 13.9 G/DL (ref 13.1–17.5)
IMM GRANULOCYTES # BLD: 0.02 10*3/MM3 (ref 0–0.03)
IMM GRANULOCYTES NFR BLD: 0.3 % (ref 0–0.6)
INR PPP: 1.08 (ref 0.91–1.09)
LYMPHOCYTES # BLD AUTO: 1.86 10*3/MM3 (ref 0.6–4.8)
LYMPHOCYTES NFR BLD AUTO: 27.4 % (ref 24–44)
MCH RBC QN AUTO: 32 PG (ref 27–31)
MCHC RBC AUTO-ENTMCNC: 34.8 G/DL (ref 32–36)
MCV RBC AUTO: 91.9 FL (ref 80–99)
MONOCYTES # BLD AUTO: 0.76 10*3/MM3 (ref 0–1)
MONOCYTES NFR BLD AUTO: 11.2 % (ref 0–12)
NEUTROPHILS # BLD AUTO: 4.02 10*3/MM3 (ref 1.5–8.3)
NEUTROPHILS NFR BLD AUTO: 59.2 % (ref 41–71)
PLATELET # BLD AUTO: 272 10*3/MM3 (ref 150–450)
PMV BLD AUTO: 10.9 FL (ref 6–12)
POTASSIUM BLD-SCNC: 4.1 MMOL/L (ref 3.5–5.5)
PROTHROMBIN TIME: 11.3 SECONDS (ref 9.6–11.5)
RBC # BLD AUTO: 4.34 10*6/MM3 (ref 4.2–5.76)
SODIUM BLD-SCNC: 137 MMOL/L (ref 132–146)
TSH SERPL DL<=0.05 MIU/L-ACNC: 1.89 MIU/ML (ref 0.35–5.35)
WBC NRBC COR # BLD: 6.79 10*3/MM3 (ref 3.5–10.8)

## 2018-06-12 PROCEDURE — 85025 COMPLETE CBC W/AUTO DIFF WBC: CPT | Performed by: NURSE PRACTITIONER

## 2018-06-12 PROCEDURE — 99232 SBSQ HOSP IP/OBS MODERATE 35: CPT | Performed by: INTERNAL MEDICINE

## 2018-06-12 PROCEDURE — 25010000002 PROMETHAZINE PER 50 MG: Performed by: RADIOLOGY

## 2018-06-12 PROCEDURE — 82962 GLUCOSE BLOOD TEST: CPT

## 2018-06-12 PROCEDURE — 84443 ASSAY THYROID STIM HORMONE: CPT | Performed by: NURSE PRACTITIONER

## 2018-06-12 PROCEDURE — 85730 THROMBOPLASTIN TIME PARTIAL: CPT | Performed by: SURGERY

## 2018-06-12 PROCEDURE — 0GB23ZX EXCISION OF LEFT ADRENAL GLAND, PERCUTANEOUS APPROACH, DIAGNOSTIC: ICD-10-PCS | Performed by: RADIOLOGY

## 2018-06-12 PROCEDURE — 77012 CT SCAN FOR NEEDLE BIOPSY: CPT

## 2018-06-12 PROCEDURE — 85610 PROTHROMBIN TIME: CPT | Performed by: SURGERY

## 2018-06-12 PROCEDURE — 88173 CYTOPATH EVAL FNA REPORT: CPT | Performed by: SURGERY

## 2018-06-12 PROCEDURE — 80048 BASIC METABOLIC PNL TOTAL CA: CPT | Performed by: NURSE PRACTITIONER

## 2018-06-12 RX ORDER — AMLODIPINE BESYLATE 10 MG/1
10 TABLET ORAL DAILY
COMMUNITY
Start: 2016-11-18 | End: 2018-06-13 | Stop reason: HOSPADM

## 2018-06-12 RX ORDER — PROMETHAZINE HYDROCHLORIDE 25 MG/ML
6.25 INJECTION, SOLUTION INTRAMUSCULAR; INTRAVENOUS ONCE
Status: COMPLETED | OUTPATIENT
Start: 2018-06-12 | End: 2018-06-12

## 2018-06-12 RX ORDER — HYDROCHLOROTHIAZIDE 12.5 MG/1
12.5 TABLET ORAL DAILY
COMMUNITY
Start: 2016-11-18 | End: 2018-06-26 | Stop reason: SDUPTHER

## 2018-06-12 RX ORDER — MEPERIDINE HYDROCHLORIDE 50 MG/ML
50 INJECTION INTRAMUSCULAR; INTRAVENOUS; SUBCUTANEOUS ONCE
Status: COMPLETED | OUTPATIENT
Start: 2018-06-12 | End: 2018-06-12

## 2018-06-12 RX ORDER — LIDOCAINE HYDROCHLORIDE 10 MG/ML
20 INJECTION, SOLUTION INFILTRATION; PERINEURAL ONCE
Status: COMPLETED | OUTPATIENT
Start: 2018-06-12 | End: 2018-06-12

## 2018-06-12 RX ORDER — LOSARTAN POTASSIUM 100 MG/1
100 TABLET ORAL DAILY
COMMUNITY
Start: 2016-11-18 | End: 2018-06-26 | Stop reason: SDUPTHER

## 2018-06-12 RX ADMIN — PROMETHAZINE HYDROCHLORIDE 6.25 MG: 25 INJECTION INTRAMUSCULAR; INTRAVENOUS at 14:19

## 2018-06-12 RX ADMIN — MEPERIDINE HYDROCHLORIDE 50 MG: 50 INJECTION, SOLUTION INTRAMUSCULAR; INTRAVENOUS; SUBCUTANEOUS at 14:19

## 2018-06-12 RX ADMIN — INSULIN LISPRO 4 UNITS: 100 INJECTION, SOLUTION INTRAVENOUS; SUBCUTANEOUS at 20:51

## 2018-06-12 RX ADMIN — INSULIN LISPRO 2 UNITS: 100 INJECTION, SOLUTION INTRAVENOUS; SUBCUTANEOUS at 17:06

## 2018-06-12 RX ADMIN — INSULIN LISPRO 4 UNITS: 100 INJECTION, SOLUTION INTRAVENOUS; SUBCUTANEOUS at 12:23

## 2018-06-12 RX ADMIN — INSULIN LISPRO 4 UNITS: 100 INJECTION, SOLUTION INTRAVENOUS; SUBCUTANEOUS at 08:11

## 2018-06-12 RX ADMIN — SODIUM CHLORIDE 75 ML/HR: 9 INJECTION, SOLUTION INTRAVENOUS at 20:51

## 2018-06-12 RX ADMIN — LIDOCAINE HYDROCHLORIDE 20 ML: 10 INJECTION, SOLUTION INFILTRATION; PERINEURAL at 13:22

## 2018-06-12 RX ADMIN — SODIUM CHLORIDE 75 ML/HR: 9 INJECTION, SOLUTION INTRAVENOUS at 06:06

## 2018-06-12 NOTE — PROGRESS NOTES
[facility] Medicine Services  PROGRESS NOTE    Patient Name: Sandra Colon  : 1970  MRN: [id number]    Date of Admission: 2018  Length of Stay: 0  Primary Care Physician: No Known Provider    Subjective   Subjective     CC: gross hematuria    HPI: Sitting up in bed with family at bedside. Patient overall feeling well. Hematuria clearing. Denies pain.    Review of Systems  Gen- No fevers, chills  CV- No chest pain, palpitations  Resp- No cough, dyspnea  GI- No N/V/D, abd pain    Otherwise ROS is negative except as mentioned in the HPI.    Objective   Objective     Vital Signs:   Temp:  [97.8 °F (36.6 °C)-98.9 °F (37.2 °C)] 98.7 °F (37.1 °C)  Heart Rate:  [49-60] 60  Resp:  [16-18] 16  BP: (111-139)/(76-98) 139/97        Physical Exam:  Constitutional: No acute distress, awake, alert  HENT: NCAT, mucous membranes moist  Respiratory: Clear to auscultation bilaterally, respiratory effort normal   Cardiovascular: RRR, no murmurs, rubs, or gallops, palpable pedal pulses bilaterally  Gastrointestinal: Positive bowel sounds, soft, nontender, nondistended  Musculoskeletal: No bilateral ankle edema  Psychiatric: Appropriate affect, cooperative  Neurologic: Oriented x 3, strength symmetric in all extremities, Cranial Nerves grossly intact to confrontation, speech clear  Skin: No rashes    Results Reviewed:  I have personally reviewed current lab, radiology, and data and agree.      Results from last 7 days  Lab Units 18  041180 18  1000 18  1058   WBC 10*3/mm3 6.79  --  6.76 6.80   HEMOGLOBIN g/dL 13.9 14.2 14.3 14.4   HEMATOCRIT % 39.9 40.9 40.7 44.4   PLATELETS 10*3/mm3 272  --  282 297   INR  1.08  --   --  1.05       Results from last 7 days  Lab Units 18  04118  1000 18  1058   SODIUM mmol/L 137 136 137   POTASSIUM mmol/L 4.1 4.1 4.2   CHLORIDE mmol/L 103 100 103   CO2 mmol/L 28.0 26.0 28.0   BUN mg/dL 12 14 13   CREATININE mg/dL  0.91 0.94 0.83   GLUCOSE mg/dL 310* 375* 305*   CALCIUM mg/dL 8.4* 8.7 8.7   ALT (SGPT) U/L  --  160* 182*   AST (SGOT) U/L  --  48* 103*     Estimated Creatinine Clearance: 113.6 mL/min (by C-G formula based on SCr of 0.91 mg/dL).  No results found for: BNP  No results found for: PHART    Microbiology Results Abnormal     Procedure Component Value - Date/Time    Urine Culture - Urine, [[id number]]  (Abnormal) Collected:  06/11/18 1001    Lab Status:  Preliminary result Specimen:  Urine from Urine, Clean Catch Updated:  06/12/18 1113     Urine Culture >100,000 CFU/mL Staphylococcus aureus (A)        CTA personally reviewed with renal masses noted. Agree with interpretation.   Imaging Results (last 24 hours)     Procedure Component Value Units Date/Time    CT Angiogram Chest With Contrast [[id number]] Collected:  06/11/18 1314     Updated:  06/11/18 1338    Narrative:       EXAMINATION: CT ANGIOGRAM CHEST W CONTRAST-, CT ABDOMEN AND PELVIS W WO  CONTRAST-06/11/2018:      INDICATION: PE. Left-sided renal mass. Shortness of breath.     TECHNIQUE: Multiple axial CT imaging was obtained of the chest following  the administration of intravenous contrast according to the CT  angiographic protocol. 2-D coronal reformatted images were submitted to  further facilitate diagnostic accuracy and treatment planning. CT scan  was also obtained of the abdomen and pelvis following the administration  of intravenous contrast.  The study is compared to prior study dated  05/30/201     The radiation dose reduction device was turned on for each scan per the  ALARA (As Low as Reasonably Achievable) protocol.     COMPARISON: CT abdomen and pelvis 05/30/2018.     FINDINGS:      CHEST: The lung parenchyma is clear. No parenchymal consolidation,  pulmonary mass or nodule identified. No pleural effusion or  pneumothorax. Degenerative changes seen within the spine. Thyroid is  homogeneous. No mediastinal mass or adenopathy. The cardiac chambers  are  within normal limits. No pericardial effusion. No bulky hilar or  axillary lymphadenopathy. No filling defect in the pulmonary arteries to  suggest evidence of pulmonary embolism. Degenerative changes seen within  the spine.     ABDOMEN: There is a solid and cystic mass again seen on the left adrenal  gland which is stable and unchanged when compared to the prior study.  There is a hypoattenuating lesion identified in the superior pole of the  left kidney with a second similar hypoattenuating lesion seen in the  upper pole and medially within the right kidney. The right kidney lesion  measures largest dimension 2 cm and left renal lesion measures  approximately 3 cm. These lesions are stable when compared to  05/30/2018. There is no evidence of hydronephrosis of the kidneys. No  renal stones identified. The pancreas is homogeneous in appearance. The  liver is homogeneous as well as the spleen. No abdominal or  retroperitoneal lymphadenopathy.     Delayed imaging reveals contrast seen in the renal collecting systems  bilaterally as well as within both ureters and bladder with no evidence  of obstruction. There is persistence seen of the hypoattenuating lesions  within the right and left kidney as above. Underlying malignancy cannot  be excluded at this time.     PELVIS: The pelvic organs are unremarkable. The pelvic portions of the  gastrointestinal tract are within normal limits. No free fluid or free  air. No abnormal mass or fluid collection is identified. Delayed imaging  reveals contrast seen in the renal collecting systems bilaterally and  bladder. The bony structures reveal no evidence of osseous abnormality.       Impression:       1.  No acute intrathoracic abnormality, no PE.  2.  Solid and cystic mass stable on the left adrenal gland.  3.  Two hypoattenuating lesions identified within the superior pole of  the left kidney and second in the medial upper pole of the right kidney.  Malignancy cannot be  excluded.     D:  06/11/2018  E:  06/11/2018           This report was finalized on 6/11/2018 1:35 PM by Dr. Maura Oates MD.       CT Abdomen Pelvis With & Without Contrast [[id number]] Collected:  06/11/18 1314     Updated:  06/11/18 1338    Narrative:       EXAMINATION: CT ANGIOGRAM CHEST W CONTRAST-, CT ABDOMEN AND PELVIS W WO  CONTRAST-06/11/2018:      INDICATION: PE. Left-sided renal mass. Shortness of breath.     TECHNIQUE: Multiple axial CT imaging was obtained of the chest following  the administration of intravenous contrast according to the CT  angiographic protocol. 2-D coronal reformatted images were submitted to  further facilitate diagnostic accuracy and treatment planning. CT scan  was also obtained of the abdomen and pelvis following the administration  of intravenous contrast.  The study is compared to prior study dated  05/30/201     The radiation dose reduction device was turned on for each scan per the  ALARA (As Low as Reasonably Achievable) protocol.     COMPARISON: CT abdomen and pelvis 05/30/2018.     FINDINGS:      CHEST: The lung parenchyma is clear. No parenchymal consolidation,  pulmonary mass or nodule identified. No pleural effusion or  pneumothorax. Degenerative changes seen within the spine. Thyroid is  homogeneous. No mediastinal mass or adenopathy. The cardiac chambers are  within normal limits. No pericardial effusion. No bulky hilar or  axillary lymphadenopathy. No filling defect in the pulmonary arteries to  suggest evidence of pulmonary embolism. Degenerative changes seen within  the spine.     ABDOMEN: There is a solid and cystic mass again seen on the left adrenal  gland which is stable and unchanged when compared to the prior study.  There is a hypoattenuating lesion identified in the superior pole of the  left kidney with a second similar hypoattenuating lesion seen in the  upper pole and medially within the right kidney. The right kidney lesion  measures largest  dimension 2 cm and left renal lesion measures  approximately 3 cm. These lesions are stable when compared to  05/30/2018. There is no evidence of hydronephrosis of the kidneys. No  renal stones identified. The pancreas is homogeneous in appearance. The  liver is homogeneous as well as the spleen. No abdominal or  retroperitoneal lymphadenopathy.     Delayed imaging reveals contrast seen in the renal collecting systems  bilaterally as well as within both ureters and bladder with no evidence  of obstruction. There is persistence seen of the hypoattenuating lesions  within the right and left kidney as above. Underlying malignancy cannot  be excluded at this time.     PELVIS: The pelvic organs are unremarkable. The pelvic portions of the  gastrointestinal tract are within normal limits. No free fluid or free  air. No abnormal mass or fluid collection is identified. Delayed imaging  reveals contrast seen in the renal collecting systems bilaterally and  bladder. The bony structures reveal no evidence of osseous abnormality.       Impression:       1.  No acute intrathoracic abnormality, no PE.  2.  Solid and cystic mass stable on the left adrenal gland.  3.  Two hypoattenuating lesions identified within the superior pole of  the left kidney and second in the medial upper pole of the right kidney.  Malignancy cannot be excluded.     D:  06/11/2018  E:  06/11/2018           This report was finalized on 6/11/2018 1:35 PM by Dr. Maura Oates MD.       XR Chest 2 View [[id number]] Collected:  06/11/18 1117     Updated:  06/11/18 1337    Narrative:       EXAMINATION: XR CHEST 2 VW-06/11/2018:      INDICATION: Cough.      COMPARISON: 05/30/2018.     FINDINGS: Two-view chest reveals cardiac and mediastinal silhouettes to  be within normal limits. The lung fields are clear. No focal parenchymal  opacification present.  No pleural effusion or pneumothorax.  Degenerative changes seen within the spine. Pulmonary vascularity  is  within normal limits.           Impression:       No acute cardiopulmonary disease.     D:  06/11/2018  E:  06/11/2018     This report was finalized on 6/11/2018 1:35 PM by Dr. Maura Oates MD.           Results for orders placed during the hospital encounter of 03/03/17   Adult Transthoracic Echo Complete    Narrative · Left ventricular function is normal. Estimated EF = 65%.  · Right ventricular cavity is mildly dilated.  · The cardiac valves are anatomically and functionally normal.          I have reviewed the medications.    Assessment/Plan   Assessment / Plan     Hospital Problem List     * (Principal)Gross hematuria    Hypertension    Overview Signed 6/11/2018  3:58 PM by NELLI Spicer     Medications stopped 1 year ago         Hyperlipidemia    Adrenal nodule    Renal mass    Type 2 diabetes mellitus without complication             Brief Hospital Course to date:  Sandra Colon is a 48 y.o. male admitted from home with hematuria found to have bilateral renal masses as well as an increase in size of his previously known adrenal nodule.    Assessment & Plan:  --Evaluation ongoing for adrenal mass with biopsy w/ IR today. Dr. Simon has seen and urology input is pending. Further plan to follow their evaluation.  --His hematuria has resolved and is related to his newly discovered renal masses.   --All other medical problems controlled at this time.  --Labs in am.    DVT Prophylaxis:  Mechanical    CODE STATUS: Full Code    Disposition: I expect the patient to be discharged TBD.      Electronically signed by Rahel Palma II, DO, 06/12/18, 11:43 AM.

## 2018-06-12 NOTE — PROGRESS NOTES
Discharge Planning Assessment  [facility]     Patient Name: Sandra Colon  MRN: [id number]  Today's Date: 6/12/2018    Admit Date: 6/11/2018          Discharge Needs Assessment     Row Name 06/12/18 1113       Living Environment    Lives With child(jalil), dependent;spouse    Current Living Arrangements home/apartment/condo    Primary Care Provided by self    Provides Primary Care For child(jalil)    Family Caregiver if Needed spouse    Quality of Family Relationships helpful;involved;supportive    Able to Return to Prior Arrangements yes       Resource/Environmental Concerns    Transportation Concerns car, none       Transition Planning    Patient/Family Anticipates Transition to home;home with family    Patient/Family Anticipated Services at Transition none    Transportation Anticipated family or friend will provide       Discharge Needs Assessment    Readmission Within the Last 30 Days no previous admission in last 30 days    Concerns to be Addressed no discharge needs identified;denies needs/concerns at this time    Equipment Currently Used at Home none    Anticipated Changes Related to Illness --   TBD    Equipment Needed After Discharge --   TBD            Discharge Plan     Row Name 06/12/18 1114       Plan    Plan HOME    Patient/Family in Agreement with Plan yes    Plan Comments Met with pt at bedside.  He resides with his wife and two dependent children in [facility].  He is independent with ADLs.  No current DME or HH services.  Confirmed he has [employer] insurance with Rx coverage.  Left VM with Anya Khan to assist with arranging PCP for pt.  No immediate needs identified/voiced.  CM will cont to follow for DCP.         Destination     No service coordination in this encounter.      Durable Medical Equipment     No service coordination in this encounter.      Dialysis/Infusion     No service coordination in this encounter.      Home Medical Care     No service coordination in this encounter.       Social Care     No service coordination in this encounter.                Demographic Summary     Row Name 06/12/18 1112       General Information    Referral Source admission list    Preferred Language English       Contact Information    Permission Granted to Share Info With     Contact Information Obtained for             Functional Status     Row Name 06/12/18 1113       Functional Status    Usual Activity Tolerance good       Functional Status, IADL    Medications independent    Meal Preparation independent    Housekeeping independent    Laundry independent    Shopping independent            Psychosocial    No documentation.           Abuse/Neglect    No documentation.           Legal    No documentation.           Substance Abuse    No documentation.           Patient Forms    No documentation.         yLric Colon

## 2018-06-12 NOTE — CONSULTS
General Surgery Consultation Note    Date of Service: 6/12/2018  Sandra Colon  [id number]  1970      Referring Provider: Rahel Palma II, DO    Location of Consult: 2 S.     Reason for Consultation: I was asked to evaluate this patient who I had seen previously in the office to recommend appropriate workup and management of his bilateral renal masses and adrenal mass.       History of Present Illness:  I am seeing, charanjityvette Colon, in consultation for Rahel Palma II, DO regarding his known adrenal mass in the setting of now bilateral renal masses.  This patient was initially referred to me as an outpatient by Dr. Del Angel to evaluate him for a finding of an adrenal mass in the setting of potentially a new ipsilateral mass at the superior pole of the kidney.  His patient had been having nonspecific abdominal complaints that were most appropriate for diagnosis of symptomatic cholelithiasis or biliary dyskinesia but ultimately the symptoms led to a CT scan which revealed at that time and adrenal tumor that was on the left side.  He underwent an appropriate biochemical evaluation by an endocrinologist and was found to have a nonsecretory tumor.  This was felt to be benign and was recommended for continued surveillance.  His symptoms persisted and he re-presented to the emergency room where the adrenal mass was found to be slightly larger and then the question of a possible ipsilateral adjacent renal tumor was raised.  We were in the process of evaluating him with a biopsy to rule out a adrenal metastasis from a renal cell tumor or other etiology for his adrenal mass before proceeding appropriately.  He given also a vague history of intermittent intermittent hematuria but we presented to the hospital yesterday with alexia hematuria.  This was not associated with worsening abdominal pain although he did have some left flank pain.  He did not have any fever or chills or suggestion of pyelonephritis.    Past  Medical History:   Diagnosis Date   • Adrenal tumor    • Diabetes mellitus    • Fatty liver    • Hyperlipidemia    • Hypertension    • Kidney tumor        No Known Allergies    No current facility-administered medications on file prior to encounter.      Current Outpatient Prescriptions on File Prior to Encounter   Medication Sig Dispense Refill   • metFORMIN (GLUCOPHAGE) 500 MG tablet Take 1 tablet by mouth 2 (Two) Times a Day With Meals. 30 tablet 0         Current Facility-Administered Medications:   •  dextrose (D50W) solution 25 g, 25 g, Intravenous, Q15 Min PRN, Letty Myrick APRN  •  dextrose (GLUTOSE) oral gel 15 g, 15 g, Oral, Q15 Min PRN, Letty Myrick, APRN  •  glucagon (human recombinant) (GLUCAGEN DIAGNOSTIC) injection 1 mg, 1 mg, Subcutaneous, PRN, Letty Myrick APRN  •  insulin lispro (humaLOG) injection 0-7 Units, 0-7 Units, Subcutaneous, 4x Daily With Meals & Nightly, NELLI Spicer, 4 Units at 06/12/18 0811  •  ondansetron (ZOFRAN) tablet 4 mg, 4 mg, Oral, Q6H PRN **OR** ondansetron (ZOFRAN) injection 4 mg, 4 mg, Intravenous, Q6H PRN, Letty Myrick APRN  •  sodium chloride 0.9 % flush 1-10 mL, 1-10 mL, Intravenous, PRN, Letty Myrick, APRN  •  Insert peripheral IV, , , Once **AND** sodium chloride 0.9 % flush 10 mL, 10 mL, Intravenous, PRN, NELLI Orourke  •  sodium chloride 0.9 % infusion, 75 mL/hr, Intravenous, Continuous, NELLI Spicer, Last Rate: 75 mL/hr at 06/12/18 0606, 75 mL/hr at 06/12/18 0606    Past Surgical History:   • CYST REMOVAL    on wrist   • KNEE SURGERY       Family History   Problem Relation Age of Onset   • No Known Problems Mother    • Diabetes Father    • No Known Problems Sister    • No Known Problems Brother    • Cancer Paternal Grandmother    • Diabetes Paternal Grandfather      Social History     Social History   • Marital status:      Spouse name: N/A   • Number of children: N/A   • Years of education: N/A     Occupational History   • Not on file.     Social  "History Main Topics   • Smoking status: Never Smoker   • Smokeless tobacco: Never Used   • Alcohol use 1.2 oz/week     2 Standard drinks or equivalent per week      Comment: only on weekends, none in 2 weeks   • Drug use: No   • Sexual activity: Defer     Other Topics Concern   • Not on file     Social History Narrative    Lives in [place] with wife and 2 kids       Review of Systems:  Review of Systems  Otherwise the 12 point review of systems is negative.    /76 (BP Location: Left arm, Patient Position: Lying)   Pulse (!) 49   Temp 97.8 °F (36.6 °C) (Oral)   Resp 16   Ht 172.7 cm (68\")   Wt 102 kg (224 lb)   SpO2 97%   BMI 34.06 kg/m²   Body mass index is 34.06 kg/m².    General: Obese gentleman who appears pleasant and in no acute distress   HEENT: PER, no icterus, normal sclera  Cardiac: regular rhythm, 9 tachycardic   Pulmonary:Breathing comfortably without retractions  Abdominal : His abdomen is soft nondistended and nontender with no ascites or organomegaly   Neurologic: awake, alert, no obvious focal deficits  Extremities: warm, no edema  Skin: no obvious rashes nor worrisome lesions seen   Lymphatic: No obvious lymphadenopathy in the cervical region    CBC    Results from last 7 days  Lab Units 06/12/18  0410   WBC 10*3/mm3 6.79   HEMOGLOBIN g/dL 13.9   HEMATOCRIT % 39.9   PLATELETS 10*3/mm3 272       CMP    Results from last 7 days  Lab Units 06/12/18  0410 06/11/18  1000   SODIUM mmol/L 137 136   POTASSIUM mmol/L 4.1 4.1   CHLORIDE mmol/L 103 100   CO2 mmol/L 28.0 26.0   BUN mg/dL 12 14   CREATININE mg/dL 0.91 0.94   CALCIUM mg/dL 8.4* 8.7   BILIRUBIN mg/dL  --  0.7   ALK PHOS U/L  --  147*   ALT (SGPT) U/L  --  160*   AST (SGOT) U/L  --  48*   GLUCOSE mg/dL 310* 375*       Radiology  Imaging Results (last 72 hours)     Procedure Component Value Units Date/Time    CT Angiogram Chest With Contrast [[id number]] Collected:  06/11/18 1314     Updated:  06/11/18 1338    Narrative:       " EXAMINATION: CT ANGIOGRAM CHEST W CONTRAST-, CT ABDOMEN AND PELVIS W WO  CONTRAST-06/11/2018:      INDICATION: PE. Left-sided renal mass. Shortness of breath.     TECHNIQUE: Multiple axial CT imaging was obtained of the chest following  the administration of intravenous contrast according to the CT  angiographic protocol. 2-D coronal reformatted images were submitted to  further facilitate diagnostic accuracy and treatment planning. CT scan  was also obtained of the abdomen and pelvis following the administration  of intravenous contrast.  The study is compared to prior study dated  05/30/201     The radiation dose reduction device was turned on for each scan per the  ALARA (As Low as Reasonably Achievable) protocol.     COMPARISON: CT abdomen and pelvis 05/30/2018.     FINDINGS:      CHEST: The lung parenchyma is clear. No parenchymal consolidation,  pulmonary mass or nodule identified. No pleural effusion or  pneumothorax. Degenerative changes seen within the spine. Thyroid is  homogeneous. No mediastinal mass or adenopathy. The cardiac chambers are  within normal limits. No pericardial effusion. No bulky hilar or  axillary lymphadenopathy. No filling defect in the pulmonary arteries to  suggest evidence of pulmonary embolism. Degenerative changes seen within  the spine.     ABDOMEN: There is a solid and cystic mass again seen on the left adrenal  gland which is stable and unchanged when compared to the prior study.  There is a hypoattenuating lesion identified in the superior pole of the  left kidney with a second similar hypoattenuating lesion seen in the  upper pole and medially within the right kidney. The right kidney lesion  measures largest dimension 2 cm and left renal lesion measures  approximately 3 cm. These lesions are stable when compared to  05/30/2018. There is no evidence of hydronephrosis of the kidneys. No  renal stones identified. The pancreas is homogeneous in appearance. The  liver is  homogeneous as well as the spleen. No abdominal or  retroperitoneal lymphadenopathy.     Delayed imaging reveals contrast seen in the renal collecting systems  bilaterally as well as within both ureters and bladder with no evidence  of obstruction. There is persistence seen of the hypoattenuating lesions  within the right and left kidney as above. Underlying malignancy cannot  be excluded at this time.     PELVIS: The pelvic organs are unremarkable. The pelvic portions of the  gastrointestinal tract are within normal limits. No free fluid or free  air. No abnormal mass or fluid collection is identified. Delayed imaging  reveals contrast seen in the renal collecting systems bilaterally and  bladder. The bony structures reveal no evidence of osseous abnormality.       Impression:       1.  No acute intrathoracic abnormality, no PE.  2.  Solid and cystic mass stable on the left adrenal gland.  3.  Two hypoattenuating lesions identified within the superior pole of  the left kidney and second in the medial upper pole of the right kidney.  Malignancy cannot be excluded.     D:  06/11/2018  E:  06/11/2018           This report was finalized on 6/11/2018 1:35 PM by Dr. Maura Oates MD.       CT Abdomen Pelvis With & Without Contrast [[id number]] Collected:  06/11/18 1314     Updated:  06/11/18 1338    Narrative:       EXAMINATION: CT ANGIOGRAM CHEST W CONTRAST-, CT ABDOMEN AND PELVIS W WO  CONTRAST-06/11/2018:      INDICATION: PE. Left-sided renal mass. Shortness of breath.     TECHNIQUE: Multiple axial CT imaging was obtained of the chest following  the administration of intravenous contrast according to the CT  angiographic protocol. 2-D coronal reformatted images were submitted to  further facilitate diagnostic accuracy and treatment planning. CT scan  was also obtained of the abdomen and pelvis following the administration  of intravenous contrast.  The study is compared to prior study dated  05/30/201     The  radiation dose reduction device was turned on for each scan per the  ALARA (As Low as Reasonably Achievable) protocol.     COMPARISON: CT abdomen and pelvis 05/30/2018.     FINDINGS:      CHEST: The lung parenchyma is clear. No parenchymal consolidation,  pulmonary mass or nodule identified. No pleural effusion or  pneumothorax. Degenerative changes seen within the spine. Thyroid is  homogeneous. No mediastinal mass or adenopathy. The cardiac chambers are  within normal limits. No pericardial effusion. No bulky hilar or  axillary lymphadenopathy. No filling defect in the pulmonary arteries to  suggest evidence of pulmonary embolism. Degenerative changes seen within  the spine.     ABDOMEN: There is a solid and cystic mass again seen on the left adrenal  gland which is stable and unchanged when compared to the prior study.  There is a hypoattenuating lesion identified in the superior pole of the  left kidney with a second similar hypoattenuating lesion seen in the  upper pole and medially within the right kidney. The right kidney lesion  measures largest dimension 2 cm and left renal lesion measures  approximately 3 cm. These lesions are stable when compared to  05/30/2018. There is no evidence of hydronephrosis of the kidneys. No  renal stones identified. The pancreas is homogeneous in appearance. The  liver is homogeneous as well as the spleen. No abdominal or  retroperitoneal lymphadenopathy.     Delayed imaging reveals contrast seen in the renal collecting systems  bilaterally as well as within both ureters and bladder with no evidence  of obstruction. There is persistence seen of the hypoattenuating lesions  within the right and left kidney as above. Underlying malignancy cannot  be excluded at this time.     PELVIS: The pelvic organs are unremarkable. The pelvic portions of the  gastrointestinal tract are within normal limits. No free fluid or free  air. No abnormal mass or fluid collection is identified.  Delayed imaging  reveals contrast seen in the renal collecting systems bilaterally and  bladder. The bony structures reveal no evidence of osseous abnormality.       Impression:       1.  No acute intrathoracic abnormality, no PE.  2.  Solid and cystic mass stable on the left adrenal gland.  3.  Two hypoattenuating lesions identified within the superior pole of  the left kidney and second in the medial upper pole of the right kidney.  Malignancy cannot be excluded.     D:  06/11/2018  E:  06/11/2018           This report was finalized on 6/11/2018 1:35 PM by Dr. Maura Oates MD.       XR Chest 2 View [[id number]] Collected:  06/11/18 1117     Updated:  06/11/18 1337    Narrative:       EXAMINATION: XR CHEST 2 VW-06/11/2018:      INDICATION: Cough.      COMPARISON: 05/30/2018.     FINDINGS: Two-view chest reveals cardiac and mediastinal silhouettes to  be within normal limits. The lung fields are clear. No focal parenchymal  opacification present.  No pleural effusion or pneumothorax.  Degenerative changes seen within the spine. Pulmonary vascularity is  within normal limits.           Impression:       No acute cardiopulmonary disease.     D:  06/11/2018  E:  06/11/2018     This report was finalized on 6/11/2018 1:35 PM by Dr. Maura Oates MD.             Hospital Problem List     * (Principal)Gross hematuria    Hypertension    Overview Signed 6/11/2018  3:58 PM by NELLI Spicer     Medications stopped 1 year ago         Hypertension is unchanged.  Continue current medications.  Blood pressure will be reassessed in 3 months.      Hyperlipidemia    Lipid abnormalities are unchanged.  Pharmacotherapy as ordered.  Lipids will be reassessed in 3 months.      Adrenal nodule    Renal mass    Type 2 diabetes mellitus without complication    Diabetes is unchanged.   Medication changes per orders.  Diabetes will be reassessed in 3 months.            Assessment:  Left adrenal mass in the setting of now a new  diagnosis of bilateral renal tumors.    Gokul hematuria which is now resolving   Plan:  Given now that we are considering the possibility of bilateral renal tumors, the etiology of which is not clear at this point given their appearance, I think urology evaluation is completely appropriate.  I do think given he has had a biochemical evaluation that has ruled out a secretory endocrine tumor of the adrenal gland, and the fact that this has increased in size between 2 recent imaging studies, and its appearance is not typical for an adrenal adenoma, a CT-guided biopsy of this is appropriate.  The decision is made for renal sparing partial nephrectomy of the 2 renal tumors certainly the adrenal mass could be resected by urology at the time of that surgery.  However at this point we have not definitively establish a diagnosis of true malignancy.  CT guided biopsy of the adrenal mass is been ordered  Urology consult is pending  I will Continue to follow and if the diagnosis of malignancy is established we will reconsult Dr. Bean Garza MD  06/12/18  8:40 AM

## 2018-06-12 NOTE — CONSULTS
Nutrition Education     Patient Name: Sandra Colon  Date of Encounter: 06/12/18 3:13 PM  MRN: [id number]  Admission date: 6/11/2018    Time: 30 min  Reason for Visit: MD consult for education on diabetic diet.    Comments:Pt seen along with daughter.  Per RN pt has just had pain med and was groggy. Spoke mainly with pt's daughter, she states he is newly diagnosed with DM. Discussed CHO counting and reading the food label along with portion sizing, eating pattern and use of sugar free products.     Pertinent Diagnosis:  Patient Active Problem List   Diagnosis   • Hypertension   • Fatty liver   • Hyperlipidemia   • Adrenal nodule   • Renal mass   • Gross hematuria   • Type 2 diabetes mellitus without complication       Problem/Intervetion:  Nutrition Diagnoses Problem 1   Problem 1:  Knowledge Deficit   Etiology (related to): MNT for Treatment/Condition; Diabetes   Signs/Symptoms: Demonstrated Information Deficit   (evidence by)    Intervention Goal:   General: Provide information regarding MNT for treatment/condition    Education/Evaluation  Education    Education Re: Diabetic diet   Education Topics: Food choices, eating pattern, label reading, portion sizes and use of sugar free products.  Pt's daughter also asked about alcohol use with DM. Advised ETOH should be used with caution and in limited amounts. Discussed it could interact with DM medications and cause additional problems with pt's liver.     Monitor/Evaluation    Per protocol    Lyric Mazariegos RD  3:13 PM

## 2018-06-12 NOTE — NURSING NOTE
Procedure complete.  Pt tolerated well.  Report called to Fred on 2F.  Pt in transport to return to room

## 2018-06-13 ENCOUNTER — HOSPITAL ENCOUNTER (OUTPATIENT)
Dept: CT IMAGING | Facility: HOSPITAL | Age: 48
End: 2018-06-13
Attending: SURGERY

## 2018-06-13 VITALS
TEMPERATURE: 97.7 F | HEART RATE: 69 BPM | DIASTOLIC BLOOD PRESSURE: 112 MMHG | BODY MASS INDEX: 33.95 KG/M2 | OXYGEN SATURATION: 97 % | HEIGHT: 68 IN | RESPIRATION RATE: 18 BRPM | SYSTOLIC BLOOD PRESSURE: 149 MMHG | WEIGHT: 224 LBS

## 2018-06-13 LAB
ANION GAP SERPL CALCULATED.3IONS-SCNC: 7 MMOL/L (ref 3–11)
BACTERIA SPEC AEROBE CULT: ABNORMAL
BUN BLD-MCNC: 12 MG/DL (ref 9–23)
BUN/CREAT SERPL: 13.2 (ref 7–25)
CALCIUM SPEC-SCNC: 8.2 MG/DL (ref 8.7–10.4)
CHLORIDE SERPL-SCNC: 101 MMOL/L (ref 99–109)
CO2 SERPL-SCNC: 24 MMOL/L (ref 20–31)
CREAT BLD-MCNC: 0.91 MG/DL (ref 0.6–1.3)
CYTO UR: NORMAL
DEPRECATED RDW RBC AUTO: 42.2 FL (ref 37–54)
ERYTHROCYTE [DISTWIDTH] IN BLOOD BY AUTOMATED COUNT: 12.3 % (ref 11.3–14.5)
GFR SERPL CREATININE-BSD FRML MDRD: 89 ML/MIN/1.73
GLUCOSE BLD-MCNC: 328 MG/DL (ref 70–100)
GLUCOSE BLDC GLUCOMTR-MCNC: 307 MG/DL (ref 70–130)
GLUCOSE BLDC GLUCOMTR-MCNC: 315 MG/DL (ref 70–130)
HCT VFR BLD AUTO: 40.8 % (ref 38.9–50.9)
HGB BLD-MCNC: 13.9 G/DL (ref 13.1–17.5)
LAB AP CASE REPORT: NORMAL
LAB AP CLINICAL INFORMATION: NORMAL
Lab: NORMAL
Lab: NORMAL
MCH RBC QN AUTO: 31.4 PG (ref 27–31)
MCHC RBC AUTO-ENTMCNC: 34.1 G/DL (ref 32–36)
MCV RBC AUTO: 92.1 FL (ref 80–99)
PATH REPORT.FINAL DX SPEC: NORMAL
PATH REPORT.GROSS SPEC: NORMAL
PLATELET # BLD AUTO: 271 10*3/MM3 (ref 150–450)
PMV BLD AUTO: 10.8 FL (ref 6–12)
POTASSIUM BLD-SCNC: 4.3 MMOL/L (ref 3.5–5.5)
RBC # BLD AUTO: 4.43 10*6/MM3 (ref 4.2–5.76)
SODIUM BLD-SCNC: 132 MMOL/L (ref 132–146)
WBC NRBC COR # BLD: 6.08 10*3/MM3 (ref 3.5–10.8)

## 2018-06-13 PROCEDURE — 99239 HOSP IP/OBS DSCHRG MGMT >30: CPT | Performed by: INTERNAL MEDICINE

## 2018-06-13 PROCEDURE — 80048 BASIC METABOLIC PNL TOTAL CA: CPT | Performed by: INTERNAL MEDICINE

## 2018-06-13 PROCEDURE — G0108 DIAB MANAGE TRN  PER INDIV: HCPCS | Performed by: REGISTERED NURSE

## 2018-06-13 PROCEDURE — 82962 GLUCOSE BLOOD TEST: CPT

## 2018-06-13 PROCEDURE — 85027 COMPLETE CBC AUTOMATED: CPT | Performed by: INTERNAL MEDICINE

## 2018-06-13 RX ORDER — SULFAMETHOXAZOLE AND TRIMETHOPRIM 800; 160 MG/1; MG/1
1 TABLET ORAL EVERY 12 HOURS SCHEDULED
Qty: 13 TABLET | Refills: 0 | Status: SHIPPED | OUTPATIENT
Start: 2018-06-13 | End: 2018-06-20

## 2018-06-13 RX ORDER — SULFAMETHOXAZOLE AND TRIMETHOPRIM 800; 160 MG/1; MG/1
1 TABLET ORAL EVERY 12 HOURS SCHEDULED
Status: DISCONTINUED | OUTPATIENT
Start: 2018-06-13 | End: 2018-06-13 | Stop reason: HOSPADM

## 2018-06-13 RX ADMIN — INSULIN LISPRO 5 UNITS: 100 INJECTION, SOLUTION INTRAVENOUS; SUBCUTANEOUS at 07:43

## 2018-06-13 RX ADMIN — SODIUM CHLORIDE 75 ML/HR: 9 INJECTION, SOLUTION INTRAVENOUS at 11:28

## 2018-06-13 RX ADMIN — SULFAMETHOXAZOLE AND TRIMETHOPRIM 160 MG: 800; 160 TABLET ORAL at 11:31

## 2018-06-13 RX ADMIN — INSULIN LISPRO 5 UNITS: 100 INJECTION, SOLUTION INTRAVENOUS; SUBCUTANEOUS at 11:27

## 2018-06-13 NOTE — PROGRESS NOTES
"General Surgery Daily Progress Note    06/13/18    Sandra Colon  [id number]  1970    * No surgery found *    Reason for Evaluation: Bilateral renal masses and left adrenal mass  Subjective:  Patient feels well today and is anxious for discharge and no pain or hematuria    Objective:  /98 (BP Location: Left arm, Patient Position: Lying)   Pulse 51   Temp 97.7 °F (36.5 °C) (Oral)   Resp 16   Ht 172.7 cm (68\")   Wt 102 kg (224 lb)   SpO2 96%   BMI 34.06 kg/m²       INTAKE/OUTPUT      Intake/Output Summary (Last 24 hours) at 06/13/18 0749  Last data filed at 06/12/18 1800   Gross per 24 hour   Intake             1090 ml   Output                0 ml   Net             1090 ml       General Appearance:No acute distress  Eyes: Anicteric  Neck: Trachea midline   Cardiovascular:  RRR  Lungs:  Bilateral respirations unlabored   Abdomen:  Changed   Extremities:  No cyanosis or edema   Skin:  Normal color  Neurologic: awake and conversant         Imaging Results (last 24 hours)     Procedure Component Value Units Date/Time    CT Needle Biopsy Retroperitoneum [[id number]] Updated:  06/12/18 1402          Labs:  @ALL@      Assessment:  Typical adrenal mass in the setting of bilateral renal masses: Biopsy is pending    Plan:   In the unlikely event that a lymphoma is identified would recommend consult Dr. Del Angel who had seen him previously.  Otherwise given the direct approach the left adrenal during partial nephrectomy I will defer his surgical care to urology at this point.  He can follow up with them as an outpatient per their recommendations.  It is been my pleasure to participate in his care    Josh Garza MD - 6/13/2018, 7:49 AM        "

## 2018-06-13 NOTE — DISCHARGE SUMMARY
[facility] Medicine Services  DISCHARGE SUMMARY    Patient Name: Sandra Colon  : 1970  MRN: [id number]    Date of Admission: 2018  Date of Discharge:  2018  Primary Care Physician: No Known Provider    Consults     Date and Time Order Name Status Description    2018 0030 Inpatient Urology Consult Completed     2018 1950 Inpatient General Surgery Consult Completed         Hospital Course     Presenting Problem:   Renal mass [N28.89]  Left renal mass [N28.89]    Active Hospital Problems (** Indicates Principal Problem)    Diagnosis Date Noted   • **Gross hematuria [R31.0] 2018   • Renal mass [N28.89] 2018   • Type 2 diabetes mellitus without complication [E11.9] 2018   • Adrenal nodule [E27.9] 10/15/2017   • Hyperlipidemia [E78.5]    • Hypertension [I10]       Resolved Hospital Problems    Diagnosis Date Noted Date Resolved   No resolved problems to display.          Hospital Course:  Sandra Colon is a 48 y.o. male who presented from home with hematuria found to have renal mass and enlarging adrenal mass.    New renal mass with enlarging adrenal mass: 47 y/o male presenting from home with gross hematuria. CT abdomen and pelvis was performed on arrival which showed bilateral renal masses with an enlarging adrenal mass. Both surgical oncology and urology evaluated the patient and initially adrenal biopsy was performed. That pathology is pending at time of discharge and will be followed up by Dr. Medrano and Dr. Del Angel. Ultimately he will likely require adrenalectomy and partial nephrectomy. This will ultimately be decided upon by Dr. Medrano whom he will see in close follow up. He will also continue to follow with Dr. Del Angel whom he has seen before if indicated by his pathology. His hematuria had resolved prior to discharge.    MRSA Urinary tract infection: He was started on bactrim based on sensitivities and will continue this for 7 days upon  discharge.        Day of Discharge     HPI: Doing very well. No hematuria. No complaints.     Review of Systems  Gen- No fevers, chills  CV- No chest pain, palpitations  Resp- No cough, dyspnea  GI- No N/V/D, abd pain      Otherwise ROS is negative except as mentioned in the HPI.    Vital Signs:   Temp:  [97.4 °F (36.3 °C)-98.2 °F (36.8 °C)] 97.7 °F (36.5 °C)  Heart Rate:  [50-69] 69  Resp:  [16-20] 18  BP: (110-149)/() 149/112     Physical Exam:  Constitutional: No acute distress, awake, alert  HENT: NCAT, mucous membranes moist  Respiratory: Clear to auscultation bilaterally, respiratory effort normal   Cardiovascular: RRR, no murmurs, rubs, or gallops, palpable pedal pulses bilaterally  Gastrointestinal: Positive bowel sounds, soft, nontender, nondistended  Musculoskeletal: No bilateral ankle edema  Psychiatric: Appropriate affect, cooperative  Neurologic: Oriented x 3, strength symmetric in all extremities, Cranial Nerves grossly intact to confrontation, speech clear  Skin: No rashes      Pertinent  and/or Most Recent Results       Results from last 7 days  Lab Units 06/13/18  0359 06/12/18  0410 06/11/18 2120 06/11/18  1000   WBC 10*3/mm3 6.08 6.79  --  6.76   HEMOGLOBIN g/dL 13.9 13.9 14.2 14.3   HEMATOCRIT % 40.8 39.9 40.9 40.7   PLATELETS 10*3/mm3 271 272  --  282   SODIUM mmol/L 132 137  --  136   POTASSIUM mmol/L 4.3 4.1  --  4.1   CHLORIDE mmol/L 101 103  --  100   CO2 mmol/L 24.0 28.0  --  26.0   BUN mg/dL 12 12  --  14   CREATININE mg/dL 0.91 0.91  --  0.94   GLUCOSE mg/dL 328* 310*  --  375*   CALCIUM mg/dL 8.2* 8.4*  --  8.7       Results from last 7 days  Lab Units 06/12/18  0410 06/11/18  1000   BILIRUBIN mg/dL  --  0.7   ALK PHOS U/L  --  147*   ALT (SGPT) U/L  --  160*   AST (SGOT) U/L  --  48*   PROTIME Seconds 11.3  --    INR  1.08  --    APTT seconds 27.8  --            Invalid input(s): TG, LDLCALC, LDLREALC    Results from last 7 days  Lab Units 06/12/18  0410 06/11/18  1000   TSH  mIU/mL 1.893  --    HEMOGLOBIN A1C %  --  11.90*     Brief Urine Lab Results  (Last result in the past 365 days)      Color   Clarity   Blood   Leuk Est   Nitrite   Protein   CREAT   Urine HCG        06/11/18 1001 Orange(A) Turbid(A) Large (3+)(A) Moderate (2+)(A) Negative 100 mg/dL (2+)(A)               Microbiology Results Abnormal     Procedure Component Value - Date/Time    Urine Culture - Urine, [[id number]]  (Abnormal)  (Susceptibility) Collected:  06/11/18 1001    Lab Status:  Preliminary result Specimen:  Urine from Urine, Clean Catch Updated:  06/13/18 0916     Urine Culture >100,000 CFU/mL Staphylococcus aureus, MRSA (A)     Comment:   Methicillin resistant Staph aureus, patient may be an isolation risk.       Susceptibility      Staphylococcus aureus, MRSA     JAZMIN (Preliminary)     Daptomycin <=0.5 ug/ml Susceptible     Gentamicin <=4 ug/ml Susceptible     Levofloxacin <=1 ug/ml Susceptible  [1]      Linezolid 2 ug/ml Susceptible     Nitrofurantoin <=32 ug/ml Susceptible     Oxacillin >2 ug/ml Resistant     Penicillin G >8 ug/ml Resistant     Quinupristin + Dalfopristin <=0.5 ug/ml Susceptible     Rifampin <=1 ug/ml Susceptible     Tetracycline <=4 ug/ml Susceptible     Trimethoprim + Sulfamethoxazole <=0.5/9.5 ug/ml Susceptible     Vancomycin 1 ug/ml Susceptible            [1]   Staphylococcus species may develop resistance during prolonged therapy with quinolones.  Isolates that are initially susceptible may become resistant within three to four days after initiation of therapy. Testing of repeat isolates may be warranted.                         Imaging Results (all)     Procedure Component Value Units Date/Time    CT Needle Biopsy Retroperitoneum [[id number]] Collected:  06/13/18 0929     Updated:  06/13/18 0930    Narrative:       EXAMINATION: CT-GUIDED NEEDLE BIOPSY, RETROPERITONEUM-06/12/2018:      INDICATION: Left adrenal mass, bilateral kidney masses; N28.89-Other  specified disorders of kidney and  ureter; R31.0-Gross hematuria;  R10.9-Unspecified abdominal pain.         TECHNIQUE: The patient was placed in the prone position. Using CT  guidance, local anesthesia and sterile technique a 22-gauge Chiba  needle.     The radiation dose reduction device was turned on for each scan per the  ALARA (As Low as Reasonably Achievable) protocol.     COMPARISON: NONE.     FINDINGS: The patient was placed in the prone position and using CT  guidance, local anesthesia, sterile technique and a 22-gauge Chiba  needle, vigorous aspiration of the Chiba needle was performed with a to  and fro motion of the needle yielding tissue and blood which appears to  be satisfactory for cytological interpretation (Dr. Cherry). Follow-up  imaging demonstrates a very small retroperitoneal hematoma and there is  no evidence of pneumothorax.       Impression:       Fine-needle aspiration of a left adrenal mass was performed  without complication.     D:  06/12/2018  E:  06/13/2018             CT Angiogram Chest With Contrast [[id number]] Collected:  06/11/18 1314     Updated:  06/11/18 1338    Narrative:       EXAMINATION: CT ANGIOGRAM CHEST W CONTRAST-, CT ABDOMEN AND PELVIS W WO  CONTRAST-06/11/2018:      INDICATION: PE. Left-sided renal mass. Shortness of breath.     TECHNIQUE: Multiple axial CT imaging was obtained of the chest following  the administration of intravenous contrast according to the CT  angiographic protocol. 2-D coronal reformatted images were submitted to  further facilitate diagnostic accuracy and treatment planning. CT scan  was also obtained of the abdomen and pelvis following the administration  of intravenous contrast.  The study is compared to prior study dated  05/30/201     The radiation dose reduction device was turned on for each scan per the  ALARA (As Low as Reasonably Achievable) protocol.     COMPARISON: CT abdomen and pelvis 05/30/2018.     FINDINGS:      CHEST: The lung parenchyma is clear. No parenchymal  consolidation,  pulmonary mass or nodule identified. No pleural effusion or  pneumothorax. Degenerative changes seen within the spine. Thyroid is  homogeneous. No mediastinal mass or adenopathy. The cardiac chambers are  within normal limits. No pericardial effusion. No bulky hilar or  axillary lymphadenopathy. No filling defect in the pulmonary arteries to  suggest evidence of pulmonary embolism. Degenerative changes seen within  the spine.     ABDOMEN: There is a solid and cystic mass again seen on the left adrenal  gland which is stable and unchanged when compared to the prior study.  There is a hypoattenuating lesion identified in the superior pole of the  left kidney with a second similar hypoattenuating lesion seen in the  upper pole and medially within the right kidney. The right kidney lesion  measures largest dimension 2 cm and left renal lesion measures  approximately 3 cm. These lesions are stable when compared to  05/30/2018. There is no evidence of hydronephrosis of the kidneys. No  renal stones identified. The pancreas is homogeneous in appearance. The  liver is homogeneous as well as the spleen. No abdominal or  retroperitoneal lymphadenopathy.     Delayed imaging reveals contrast seen in the renal collecting systems  bilaterally as well as within both ureters and bladder with no evidence  of obstruction. There is persistence seen of the hypoattenuating lesions  within the right and left kidney as above. Underlying malignancy cannot  be excluded at this time.     PELVIS: The pelvic organs are unremarkable. The pelvic portions of the  gastrointestinal tract are within normal limits. No free fluid or free  air. No abnormal mass or fluid collection is identified. Delayed imaging  reveals contrast seen in the renal collecting systems bilaterally and  bladder. The bony structures reveal no evidence of osseous abnormality.       Impression:       1.  No acute intrathoracic abnormality, no PE.  2.  Solid and  cystic mass stable on the left adrenal gland.  3.  Two hypoattenuating lesions identified within the superior pole of  the left kidney and second in the medial upper pole of the right kidney.  Malignancy cannot be excluded.     D:  06/11/2018  E:  06/11/2018           This report was finalized on 6/11/2018 1:35 PM by Dr. Maura Oates MD.       CT Abdomen Pelvis With & Without Contrast [[id number]] Collected:  06/11/18 1314     Updated:  06/11/18 1338    Narrative:       EXAMINATION: CT ANGIOGRAM CHEST W CONTRAST-, CT ABDOMEN AND PELVIS W WO  CONTRAST-06/11/2018:      INDICATION: PE. Left-sided renal mass. Shortness of breath.     TECHNIQUE: Multiple axial CT imaging was obtained of the chest following  the administration of intravenous contrast according to the CT  angiographic protocol. 2-D coronal reformatted images were submitted to  further facilitate diagnostic accuracy and treatment planning. CT scan  was also obtained of the abdomen and pelvis following the administration  of intravenous contrast.  The study is compared to prior study dated  05/30/201     The radiation dose reduction device was turned on for each scan per the  ALARA (As Low as Reasonably Achievable) protocol.     COMPARISON: CT abdomen and pelvis 05/30/2018.     FINDINGS:      CHEST: The lung parenchyma is clear. No parenchymal consolidation,  pulmonary mass or nodule identified. No pleural effusion or  pneumothorax. Degenerative changes seen within the spine. Thyroid is  homogeneous. No mediastinal mass or adenopathy. The cardiac chambers are  within normal limits. No pericardial effusion. No bulky hilar or  axillary lymphadenopathy. No filling defect in the pulmonary arteries to  suggest evidence of pulmonary embolism. Degenerative changes seen within  the spine.     ABDOMEN: There is a solid and cystic mass again seen on the left adrenal  gland which is stable and unchanged when compared to the prior study.  There is a  hypoattenuating lesion identified in the superior pole of the  left kidney with a second similar hypoattenuating lesion seen in the  upper pole and medially within the right kidney. The right kidney lesion  measures largest dimension 2 cm and left renal lesion measures  approximately 3 cm. These lesions are stable when compared to  05/30/2018. There is no evidence of hydronephrosis of the kidneys. No  renal stones identified. The pancreas is homogeneous in appearance. The  liver is homogeneous as well as the spleen. No abdominal or  retroperitoneal lymphadenopathy.     Delayed imaging reveals contrast seen in the renal collecting systems  bilaterally as well as within both ureters and bladder with no evidence  of obstruction. There is persistence seen of the hypoattenuating lesions  within the right and left kidney as above. Underlying malignancy cannot  be excluded at this time.     PELVIS: The pelvic organs are unremarkable. The pelvic portions of the  gastrointestinal tract are within normal limits. No free fluid or free  air. No abnormal mass or fluid collection is identified. Delayed imaging  reveals contrast seen in the renal collecting systems bilaterally and  bladder. The bony structures reveal no evidence of osseous abnormality.       Impression:       1.  No acute intrathoracic abnormality, no PE.  2.  Solid and cystic mass stable on the left adrenal gland.  3.  Two hypoattenuating lesions identified within the superior pole of  the left kidney and second in the medial upper pole of the right kidney.  Malignancy cannot be excluded.     D:  06/11/2018  E:  06/11/2018           This report was finalized on 6/11/2018 1:35 PM by Dr. Maura Oates MD.       XR Chest 2 View [[id number]] Collected:  06/11/18 1117     Updated:  06/11/18 1337    Narrative:       EXAMINATION: XR CHEST 2 VW-06/11/2018:      INDICATION: Cough.      COMPARISON: 05/30/2018.     FINDINGS: Two-view chest reveals cardiac and  mediastinal silhouettes to  be within normal limits. The lung fields are clear. No focal parenchymal  opacification present.  No pleural effusion or pneumothorax.  Degenerative changes seen within the spine. Pulmonary vascularity is  within normal limits.           Impression:       No acute cardiopulmonary disease.     D:  06/11/2018  E:  06/11/2018     This report was finalized on 6/11/2018 1:35 PM by Dr. Maura Oates MD.             Results for orders placed during the hospital encounter of 06/11/18   Duplex Venous Lower Extremity - Bilateral    Narrative · Normal bilateral lower extremity venous duplex scan. No evidence of DVT.          Results for orders placed during the hospital encounter of 06/11/18   Duplex Venous Lower Extremity - Bilateral    Narrative · Normal bilateral lower extremity venous duplex scan. No evidence of DVT.          Results for orders placed during the hospital encounter of 03/03/17   Adult Transthoracic Echo Complete    Narrative · Left ventricular function is normal. Estimated EF = 65%.  · Right ventricular cavity is mildly dilated.  · The cardiac valves are anatomically and functionally normal.           Order Current Status    Tissue Pathology Exam In process    Urine Culture - Urine, Preliminary result        Discharge Details        Discharge Medications      New Medications      Instructions Start Date   insulin detemir 100 UNIT/ML injection  Commonly known as:  LEVEMIR FLEXTOUCH   10 Units, Subcutaneous, Nightly      sulfamethoxazole-trimethoprim 800-160 MG per tablet  Commonly known as:  BACTRIM DS,SEPTRA DS   1 tablet, Oral, Every 12 Hours Scheduled         Continue These Medications      Instructions Start Date   glucose blood test strip   1 strip, Subdermal, Daily      hydrochlorothiazide 12.5 MG tablet  Commonly known as:  HYDRODIURIL   12.5 mg, Oral, Daily      Lancet Devices misc   1 Piece, Subdermal, Daily      losartan 100 MG tablet  Commonly known as:  COZAAR    100 mg, Oral, Daily      metFORMIN 500 MG tablet  Commonly known as:  GLUCOPHAGE   500 mg, Oral, 2 Times Daily With Meals         Stop These Medications    amLODIPine 10 MG tablet  Commonly known as:  NORVASC              Discharge Disposition:      Discharge Diet: CC2      Discharge Activity: As tolerated      Future Appointments  Date Time Provider Department Center   6/19/2018 10:30 AM Miguel Angel Marquez DO [facility] None   11/7/2018 10:00 AM Roma Abbott MD [facility] None       Additional Instructions for the Follow-ups that You Need to Schedule     Discharge Follow-up with PCP    As directed      Follow Up Details:  1 week hospital follow up         Discharge Follow-up with Specialty: Dr. Medrano; 5 Days    As directed      Specialty:  Dr. Medrano    Follow Up:  5 Days               Time Spent on Discharge: 45 minutes    Electronically signed by Rahel Palma II, DO, 06/13/18, 1:25 PM.

## 2018-06-13 NOTE — CONSULTS
"Diabetes Education  Assessment/Teaching    Patient Name:  Sandra Colon  YOB: 1970  MRN: [id number]  Admit Date:  6/11/2018      Assessment Date:  6/13/2018    Most Recent Value   General Information    Referral From:  A1c, Blood glucose, MD order [11.9%]   Height  172.7 cm (68\")   Height Method  Stated   Weight  102 kg (224 lb)   Weight Method  Stated   Pregnancy Assessment   Diabetes History   What type of diabetes do you have?  Type 2   Length of Diabetes Diagnosis  Newly diagnosed <6 months [he said he was told about 2-3 years ago that his glucoses were elevated but not diabetes at that time]   Current DM knowledge  fair   Have you had diabetes education/teaching in the past?  no   Do you test your blood sugar at home?  yes   Frequency of checks  once a day   Meter type  unknown   Who performs the test?  self, stated the meter was given to him several months ago   Education Preferences   Nutrition Information   When was the last time you saw a dietician?  never   Assessment Topics   Healthy Eating - Assessment  Needs education   Being Active - Assessment  Needs education   Taking Medication - Assessment  Needs education   Problem Solving - Assessment  Needs education   Reducing Risk - Assessment  Needs education   Healthy Coping - Assessment  Needs education   Monitoring - Assessment  Needs education   DM Goals   Problem Solving - Goal  0-30 days from discharge [f/u with PCP]   Contact Plan  Phone call, 0-30 days from discharge            Most Recent Value   DM Education Needs   Meter  Has own   Frequency of Testing  -- [instructed that if IDDM 4 x day and if oral meds at least once a day]   Blood Glucose Target  -- [provided and discussed ADA recommended glucose and A1C goals]   Medication  Insulin, Oral, Actions, Side effects, Administration, Pen [demonstrted and had him do teach back for how to prepare a PEN, inject, rotate sites and dispose of sharps.]   Problem Solving  Hypoglycemia, " Hyperglycemia, Sick days, Signs, Symptoms, Treatment   Reducing Risks  A1C testing   Healthy Eating  RD consult   Physical Activity Frequency  Discussed exercise importance   Healthy Coping  Appropriate   Discharge Plan  Home   Motivation  Engaged, Strong   Teaching Method  Explanation, Discussion, Demonstration, Handouts, Teach back   Patient Response  Verbalized understanding, Demonstrates adequately            Other Comments:  Mr. Colon said he was told about 2-3 years ago that he had elevated glucoses but it was not yet diabetes. He said the metformin was prescribed when he was in ER several weeks ago. He already has a glucose meter but needs a script for test strips. Provided handout s regarding desired glucose and A1C goals and discussed. Discussed and taught patient about current hospital insulins including the onset, peak, and duration of the insulins. Taught patient the correct sites for insulin injections and the importance of rotating insulin injection site. Taught patient the correct storage for opened insulin at room temperature, storage for unopened insulin in the refrigerator, and expiration dates for insulin. Also, discussed and taught patient the correct disposable of sharps.  Demonstrated and taught patient how to use demo pen with appropriate injection technique on the demo pillow. Patient was able to return demonstration without difficulty. Discussed and taught patient the s/s of hypoglycemia and treatment measures for hypoglycemia. Patient was encouraged to follow up with their primary caregiver and/or local pharmacist for any questions or concerns. He said the RD visited him earlier today and he had some basic CHO questions which I answered. Discussed the OP education classes and he said he is interested after he has the kidney and adrenal masses taken care of. Provided my contact information and urged him to call prn.      Electronically signed by:  Jossie Ortiz RN  06/13/18 1:34 PM

## 2018-06-13 NOTE — CONSULTS
Urology Consult Note    Sandra Colon  LOS: 1      ASSESSMENT:    48 y.o. male with a large cystic/septated L adrenal mass that appears very worrisome for neoplasm along with newly discovered small bilateral renal masses.  The adrenal tumor has apparently been proven to be non-functional by endocrine evaluation..  This is in a healty patient that appears to have no endocrine or other syndromes and has no overt clinical or radiographic evidence of metastatic disease.     DISCUSSION/RECOMMENDATIONS/PLAN:  I certainly believe that proceeding with percutaneous biopsy of this adrenal lesion is appropriate and best place to start.  I would expect that he will possibly come to open L adrenalectomy and L partial nephrectomy save for finding of lymphoma on biopsy.    HPI:  Sandra Colon is a 48 y.o. male who was admitted 6/11/2018  for Renal mass [N28.89]  Left renal mass [N28.89]. Patient was referred by Dr. Tegan Baez for evaluation of an adrenal and bilateral renal masses. Patient has had hematuria for 4 days.  Patient has significant prior urologic history of  - no significant prior  Hx. Patient denies history of urolithiasis,  trauma,  cancer and  surgery. Patient has not seen a urologist in the past. Previous endocrine laboratory evaluation was reported to deternine that this adrenal tumor is non-functional..    Past Medical History:   Diagnosis Date   • Adrenal tumor    • Diabetes mellitus    • Fatty liver    • Hyperlipidemia    • Hypertension    • Kidney tumor      Past Surgical History:   Procedure Laterality Date   • CYST REMOVAL Left     on wrist   • KNEE SURGERY Left      Prescriptions Prior to Admission   Medication Sig Dispense Refill Last Dose   • amLODIPine (NORVASC) 10 MG tablet Take 10 mg by mouth Daily.      • glucose blood test strip 1 strip by Subdermal route Daily.      • hydrochlorothiazide (HYDRODIURIL) 12.5 MG tablet Take 12.5 mg by mouth Daily.      • Lancet Devices misc 1 Piece by  "Subdermal route Daily.      • losartan (COZAAR) 100 MG tablet Take 100 mg by mouth Daily.      • metFORMIN (GLUCOPHAGE) 500 MG tablet Take 1 tablet by mouth 2 (Two) Times a Day With Meals. 30 tablet 0      No Known Allergies  Family History   Problem Relation Age of Onset   • No Known Problems Mother    • Diabetes Father    • No Known Problems Sister    • No Known Problems Brother    • Cancer Paternal Grandmother    • Diabetes Paternal Grandfather      Social History     Social History   • Marital status:      Spouse name: N/A   • Number of children: N/A   • Years of education: N/A     Occupational History   • Not on file.     Social History Main Topics   • Smoking status: Never Smoker   • Smokeless tobacco: Never Used   • Alcohol use 1.2 oz/week     2 Standard drinks or equivalent per week      Comment: only on weekends, none in 2 weeks   • Drug use: No   • Sexual activity: Defer     Other Topics Concern   • Not on file     Social History Narrative    Lives in [place] with wife and 2 kids         Review of Systems  Constitutional: negative for anorexia, chills, fatigue, fevers, malaise, night sweats, sweats and weight loss   Cardiac/GI/pulmonary/neuro - negative    Objective     Blood pressure 119/91, pulse 57, temperature 97.9 °F (36.6 °C), temperature source Oral, resp. rate 20, height 172.7 cm (68\"), weight 102 kg (224 lb), SpO2 96 %.  /91 (BP Location: Left arm, Patient Position: Lying)   Pulse 57   Temp 97.9 °F (36.6 °C) (Oral)   Resp 20   Ht 172.7 cm (68\")   Wt 102 kg (224 lb)   SpO2 96%   BMI 34.06 kg/m²     Physicial Exam    General: WDWN male in NAD  Neck: Supple with no masses or adenopathy  Back: No CVAT, spine linear and non-tender   Abdomen: Soft and non-tender with no masses, organomegaly or guarding.  : Normal male  Extremities: FROM with no edema, pulses full  Neuro: Nonfocal        Imaging  CT Abdomen: Large 6 cm septated/cystic L adrenal mass, hypoattenuating endophytic " but upper pole tumor of L kidney and smaller endophytic of medial upper pole of R kidney.  There is no obvious appearance of metastatic disease or local invasion into surrounding structures    Labs  Urine Microscopy:   Results from last 7 days  Lab Units 06/11/18  1001   RBC UA /HPF Too Numerous to Count*   WBC UA /HPF 13-20*   , Urinalysis:   Results from last 7 days  Lab Units 06/11/18  1001   PH, URINE  <=5.0   PROTEIN UA  100 mg/dL (2+)*   GLUCOSE UA  >=1000 mg/dL (3+)*   KETONES UA  Negative   , BMP:   Results from last 7 days  Lab Units 06/12/18  0410 06/11/18  1000   SODIUM mmol/L 137 136   POTASSIUM mmol/L 4.1 4.1   CALCIUM mg/dL 8.4* 8.7   CHLORIDE mmol/L 103 100   CO2 mmol/L 28.0 26.0   BUN mg/dL 12 14   CREATININE mg/dL 0.91 0.94   ALBUMIN g/dL  --  3.80   BILIRUBIN mg/dL  --  0.7   ALK PHOS U/L  --  147*    and CBC:   Results from last 7 days  Lab Units 06/12/18  0410   WBC 10*3/mm3 6.79   RBC 10*6/mm3 4.34   HEMOGLOBIN g/dL 13.9   HEMATOCRIT % 39.9   PLATELETS 10*3/mm3 272       Dariusz Medrano MD - 6/12/2018, 10:00 PM

## 2018-06-13 NOTE — PLAN OF CARE
Problem: Patient Care Overview  Goal: Plan of Care Review  Outcome: Ongoing (interventions implemented as appropriate)   06/13/18 7341   Plan of Care Review   Progress improving   OTHER   Outcome Summary Pt has had no c/o pain as well as no hematuria. Tolerating diet well, hopeful for discharge tomorrow.    Coping/Psychosocial   Plan of Care Reviewed With patient

## 2018-06-14 ENCOUNTER — TRANSITIONAL CARE MANAGEMENT TELEPHONE ENCOUNTER (OUTPATIENT)
Dept: INTERNAL MEDICINE | Facility: CLINIC | Age: 48
End: 2018-06-14

## 2018-06-19 ENCOUNTER — OFFICE VISIT (OUTPATIENT)
Dept: INTERNAL MEDICINE | Facility: CLINIC | Age: 48
End: 2018-06-19

## 2018-06-19 VITALS
DIASTOLIC BLOOD PRESSURE: 88 MMHG | HEIGHT: 68 IN | OXYGEN SATURATION: 97 % | BODY MASS INDEX: 32.92 KG/M2 | SYSTOLIC BLOOD PRESSURE: 132 MMHG | HEART RATE: 65 BPM | WEIGHT: 217.2 LBS | TEMPERATURE: 97.6 F

## 2018-06-19 DIAGNOSIS — E11.9 TYPE 2 DIABETES MELLITUS WITHOUT COMPLICATION, UNSPECIFIED LONG TERM INSULIN USE STATUS: Primary | ICD-10-CM

## 2018-06-19 PROCEDURE — 99495 TRANSJ CARE MGMT MOD F2F 14D: CPT | Performed by: FAMILY MEDICINE

## 2018-06-19 RX ORDER — ROSUVASTATIN CALCIUM 20 MG/1
20 TABLET, COATED ORAL NIGHTLY
Qty: 30 TABLET | Refills: 1 | Status: SHIPPED | OUTPATIENT
Start: 2018-06-19 | End: 2018-09-24 | Stop reason: SDUPTHER

## 2018-06-19 RX ORDER — METFORMIN HYDROCHLORIDE 750 MG/1
750 TABLET, EXTENDED RELEASE ORAL
Qty: 30 TABLET | Refills: 1 | Status: SHIPPED | OUTPATIENT
Start: 2018-06-19 | End: 2018-09-24 | Stop reason: SDUPTHER

## 2018-06-19 RX ORDER — GLIPIZIDE 10 MG/1
10 TABLET, FILM COATED, EXTENDED RELEASE ORAL
Qty: 30 TABLET | Refills: 1 | Status: SHIPPED | OUTPATIENT
Start: 2018-06-19 | End: 2018-09-20

## 2018-06-19 NOTE — PROGRESS NOTES
Subjective   Sandra Colon is a 48 y.o. male.     Chief Complaint   Patient presents with   • Establish Care     Ligia Alvares out of [place]   • Diabetes     feels a lot of pressure in head       History of Present Illness   48-year-old male presents today for hospital follow-up.  He was admitted on 6/11 and discharged on 6/13/18.  Hospital course is below.    Hospital Course:  Sandra Colon is a 48 y.o. male who presented from home with hematuria found to have renal mass and enlarging adrenal mass.     New renal mass with enlarging adrenal mass: 47 y/o male presenting from home with gross hematuria. CT abdomen and pelvis was performed on arrival which showed bilateral renal masses with an enlarging adrenal mass. Both surgical oncology and urology evaluated the patient and initially adrenal biopsy was performed. That pathology is pending at time of discharge and will be followed up by Dr. Medrano and Dr. Del Angel. Ultimately he will likely require adrenalectomy and partial nephrectomy. This will ultimately be decided upon by Dr. Medrano whom he will see in close follow up. He will also continue to follow with Dr. Del Angel whom he has seen before if indicated by his pathology. His hematuria had resolved prior to discharge.     MRSA Urinary tract infection: He was started on bactrim based on sensitivities and continued this for 7 days upon discharge.     Also newly diagnosed diabetes, A1c 11.9%, FSBS 278-532. He was started on insulin but is having a little difficulty with compliance.  He does not like the injections and is not sure that he is doing it correctly.  He states that he can tell when his blood sugars are getting too high, mostly manifesting as a headache.    The following portions of the patient's history were reviewed and updated as appropriate: allergies, current medications, past family history, past medical history, past social history, past surgical history and problem list.    Active Ambulatory  Problems     Diagnosis Date Noted   • Hypertension    • Fatty liver    • Hyperlipidemia    • Adrenal nodule 10/15/2017   • Renal mass 06/11/2018   • Gross hematuria 06/11/2018   • Type 2 diabetes mellitus without complication 06/11/2018     Resolved Ambulatory Problems     Diagnosis Date Noted   • No Resolved Ambulatory Problems     Past Medical History:   Diagnosis Date   • Adrenal tumor    • Diabetes mellitus    • Fatty liver    • Hyperlipidemia    • Hypertension    • Kidney tumor      Past Surgical History:   Procedure Laterality Date   • CYST REMOVAL Left     on wrist   • KNEE SURGERY Left      Family History   Problem Relation Age of Onset   • No Known Problems Mother    • Diabetes Father    • No Known Problems Sister    • No Known Problems Brother    • Cancer Paternal Grandmother    • Diabetes Paternal Grandfather      Social History     Social History   • Marital status:      Spouse name: N/A   • Number of children: N/A   • Years of education: N/A     Occupational History   • Not on file.     Social History Main Topics   • Smoking status: Never Smoker   • Smokeless tobacco: Never Used   • Alcohol use 1.2 oz/week     2 Standard drinks or equivalent per week      Comment: only on weekends, none in 2 weeks   • Drug use: No   • Sexual activity: Defer     Other Topics Concern   • Not on file     Social History Narrative    Lives in [place] with wife and 2 kids         Review of Systems   Constitutional: Negative.    HENT: Negative.    Eyes: Negative.    Respiratory: Negative for cough, chest tightness, shortness of breath and wheezing.    Cardiovascular: Negative for chest pain and palpitations.   Gastrointestinal: Negative for abdominal distention, abdominal pain, constipation, diarrhea, nausea and vomiting.   Genitourinary: Positive for frequency. Negative for dysuria and flank pain.   Musculoskeletal: Negative for gait problem and joint swelling.   Skin: Negative for color change and wound.  "  Psychiatric/Behavioral: Negative for agitation and hallucinations.       Objective   Blood pressure 132/88, pulse 65, temperature 97.6 °F (36.4 °C), temperature source Temporal Artery , height 172.7 cm (68\"), weight 98.5 kg (217 lb 3.2 oz), SpO2 97 %.  Nursing note reviewed  Physical Exam  Const: NAD, A&Ox4, Pleasant, Cooperative  Eyes: EOMI, no conjunctivitis  ENT: No nasal discharge present, neck supple  Cardiac: Regular rate and rhythm, no peripheral edema or cyanosis  Resp: Respiratory rate within normal limits, no increased work of breathing, no audible wheezing or retractions noted  GI: No distention or ascites  MSK: Motor and sensation grossly intact in bilateral upper extremities  Neurologic, CN II-XII grossly intact  Psych: Appropriate mood and behavior.  Skin: Pink, warm, dry  Procedures  Assessment/Plan   Sandra was seen today for establish care and diabetes.    Diagnoses and all orders for this visit:    Type 2 diabetes mellitus without complication, unspecified long term insulin use status  -     metFORMIN ER (GLUCOPHAGE-XR) 750 MG 24 hr tablet; Take 1 tablet by mouth Daily With Dinner.  -     glipiZIDE (GLUCOTROL XL) 10 MG 24 hr tablet; Take 1 tablet by mouth Every Morning Before Breakfast.  -     rosuvastatin (CRESTOR) 20 MG tablet; Take 1 tablet by mouth Every Night.  -     Comprehensive Metabolic Panel; Future  -     Lipid Panel; Future  -     Hemoglobin A1c; Future  -     MicroAlbumin, Urine, Random - Urine, Clean Catch; Future  -     Ambulatory referral to Ophthalmology      Diabetic Health Maintenance  Type 2, insulin-dependent  - Diagnosed: 6/11/18  Seeing endocrine: No   - Insulin regimen: Looks like he was initially slated to be taking 10 units of Lantus daily at bedtime  Other pertinent medications: Metformin 500 mg twice a day  Known complications: None  FSBS range: Ranging between 250-535  - Checking: Sporadically, typically once per day  Last yearly dilated eye exam: Never  - Last/next " yearly ophthalmologist visit: Referral placed today  Last monofilament foot exam: Never  - Seen by podiatry: No   Lab Results   Component Value Date    HGBA1C 11.90 (H) 06/11/2018     Lab Results   Component Value Date    CREATININE 0.91 06/13/2018     Last microalbuminuria screening: Order placed today  Last visit with dietician: Spoke with a dietitian in the hospital  Current blood pressure: 132/88 today  Assessment: Diabetes Mellitus: control uncertain.   Plan: See orders for this visit as documented in the electronic medical record. Diabetic issues reviewed with him: diabetic diet discussed in detail, written exchange diet given, all medications, side effects and compliance discussed carefully, foot care discussed and Podiatry visits discussed, annual eye examinations at Ophthalmology discussed, glycohemoglobin and other lab monitoring discussed and long term diabetic complications discussed.  · In talking to him and his daughter, I think the insulin regimen may be a little complex for him, and frankly I don't believe he wants to be using injectable insulin.  He has completely changed his diet since being diagnosed, he states that this whole thing has scared him, he is completely cutting out fried foods and sweets.  He has lost already about 7 pounds since discharge, and 18 pounds since the end of May.  · We will increase his metformin to 750 mg of the extended release  · I think he would be a good candidate for medication like Invokana, but we will start him with glipizide 10 mg daily  · I like to see him again in a few weeks to see how he is tolerating his medications.  We will do a repeat A1c approximately in September.  · He was started on rosuvastatin 20 mg today.  We have no lipid results on file, but we will try to get them from his primary care provider.  I would like to see at least a 50% reduction in his LDL from the pre-medication level.     #2 renal mass  · Managed by Dr. Medrano.  He should continue to  follow-up with him.    #3 class I obesity  BMI Readings from Last 3 Encounters:   06/19/18 33.03 kg/m²   06/11/18 34.06 kg/m²   05/30/18 35.73 kg/m²   · Already doing very well on weight loss.  He has completely overhauled his diet.  Diabetic diet was reviewed in detail with him today.  · He should continue his weight loss efforts.  We will see him again in 3 months.    #4 hypertension  · Doing well, at goal today 132/88  · Continue the losartan 100 mg tablet as well as HCTZ 12.5 mg tablet    We will plan to obtain previous records both for chronic preventative care as well as those related to the current episode of care.  Any records that the patient brought with him today were reviewed personally by me during the visit today and will be scanned into the chart for posterity.    Patient Instructions       Diabetes Mellitus and Nutrition  When you have diabetes (diabetes mellitus), it is very important to have healthy eating habits because your blood sugar (glucose) levels are greatly affected by what you eat and drink. Eating healthy foods in the appropriate amounts, at about the same times every day, can help you:  · Control your blood glucose.  · Lower your risk of heart disease.  · Improve your blood pressure.  · Reach or maintain a healthy weight.    Every person with diabetes is different, and each person has different needs for a meal plan. Your health care provider may recommend that you work with a diet and nutrition specialist (dietitian) to make a meal plan that is best for you. Your meal plan may vary depending on factors such as:  · The calories you need.  · The medicines you take.  · Your weight.  · Your blood glucose, blood pressure, and cholesterol levels.  · Your activity level.  · Other health conditions you have, such as heart or kidney disease.    How do carbohydrates affect me?  Carbohydrates affect your blood glucose level more than any other type of food. Eating carbohydrates naturally  increases the amount of glucose in your blood. Carbohydrate counting is a method for keeping track of how many carbohydrates you eat. Counting carbohydrates is important to keep your blood glucose at a healthy level, especially if you use insulin or take certain oral diabetes medicines.  It is important to know how many carbohydrates you can safely have in each meal. This is different for every person. Your dietitian can help you calculate how many carbohydrates you should have at each meal and for snack.  Foods that contain carbohydrates include:  · Bread, cereal, rice, pasta, and crackers.  · Potatoes and corn.  · Peas, beans, and lentils.  · Milk and yogurt.  · Fruit and juice.  · Desserts, such as cakes, cookies, ice cream, and candy.    How does alcohol affect me?  Alcohol can cause a sudden decrease in blood glucose (hypoglycemia), especially if you use insulin or take certain oral diabetes medicines. Hypoglycemia can be a life-threatening condition. Symptoms of hypoglycemia (sleepiness, dizziness, and confusion) are similar to symptoms of having too much alcohol.  If your health care provider says that alcohol is safe for you, follow these guidelines:  · Limit alcohol intake to no more than 1 drink per day for nonpregnant women and 2 drinks per day for men. One drink equals 12 oz of beer, 5 oz of wine, or 1½ oz of hard liquor.  · Do not drink on an empty stomach.  · Keep yourself hydrated with water, diet soda, or unsweetened iced tea.  · Keep in mind that regular soda, juice, and other mixers may contain a lot of sugar and must be counted as carbohydrates.    What are tips for following this plan?  Reading food labels  · Start by checking the serving size on the label. The amount of calories, carbohydrates, fats, and other nutrients listed on the label are based on one serving of the food. Many foods contain more than one serving per package.  · Check the total grams (g) of carbohydrates in one serving. You  "can calculate the number of servings of carbohydrates in one serving by dividing the total carbohydrates by 15. For example, if a food has 30 g of total carbohydrates, it would be equal to 2 servings of carbohydrates.  · Check the number of grams (g) of saturated and trans fats in one serving. Choose foods that have low or no amount of these fats.  · Check the number of milligrams (mg) of sodium in one serving. Most people should limit total sodium intake to less than 2,300 mg per day.  · Always check the nutrition information of foods labeled as \"low-fat\" or \"nonfat\". These foods may be higher in added sugar or refined carbohydrates and should be avoided.  · Talk to your dietitian to identify your daily goals for nutrients listed on the label.  Shopping  · Avoid buying canned, premade, or processed foods. These foods tend to be high in fat, sodium, and added sugar.  · Shop around the outside edge of the grocery store. This includes fresh fruits and vegetables, bulk grains, fresh meats, and fresh dairy.  Cooking  · Use low-heat cooking methods, such as baking, instead of high-heat cooking methods like deep frying.  · Cook using healthy oils, such as olive, canola, or sunflower oil.  · Avoid cooking with butter, cream, or high-fat meats.  Meal planning  · Eat meals and snacks regularly, preferably at the same times every day. Avoid going long periods of time without eating.  · Eat foods high in fiber, such as fresh fruits, vegetables, beans, and whole grains. Talk to your dietitian about how many servings of carbohydrates you can eat at each meal.  · Eat 4-6 ounces of lean protein each day, such as lean meat, chicken, fish, eggs, or tofu. 1 ounce is equal to 1 ounce of meat, chicken, or fish, 1 egg, or 1/4 cup of tofu.  · Eat some foods each day that contain healthy fats, such as avocado, nuts, seeds, and fish.  Lifestyle    · Check your blood glucose regularly.  · Exercise at least 30 minutes 5 or more days each " week, or as told by your health care provider.  · Take medicines as told by your health care provider.  · Do not use any products that contain nicotine or tobacco, such as cigarettes and e-cigarettes. If you need help quitting, ask your health care provider.  · Work with a counselor or diabetes educator to identify strategies to manage stress and any emotional and social challenges.  What are some questions to ask my health care provider?  · Do I need to meet with a diabetes educator?  · Do I need to meet with a dietitian?  · What number can I call if I have questions?  · When are the best times to check my blood glucose?  Where to find more information:  · American Diabetes Association: diabetes.org/food-and-fitness/food  · Academy of Nutrition and Dietetics: www.eatright.org/resources/health/diseases-and-conditions/diabetes  · National [employer] of Diabetes and Digestive and Kidney Diseases (NIH): www.niddk.nih.gov/health-information/diabetes/overview/diet-eating-physical-activity  Summary  · A healthy meal plan will help you control your blood glucose and maintain a healthy lifestyle.  · Working with a diet and nutrition specialist (dietitian) can help you make a meal plan that is best for you.  · Keep in mind that carbohydrates and alcohol have immediate effects on your blood glucose levels. It is important to count carbohydrates and to use alcohol carefully.  This information is not intended to replace advice given to you by your health care provider. Make sure you discuss any questions you have with your health care provider.  Document Released: 09/14/2006 Document Revised: 01/22/2018 Document Reviewed: 01/22/2018  [employer] Interactive Patient Education © 2018 [employer]        Current Outpatient Prescriptions:   •  glipiZIDE (GLUCOTROL XL) 10 MG 24 hr tablet, Take 1 tablet by mouth Every Morning Before Breakfast., Disp: 30 tablet, Rfl: 1  •  glucose blood test strip, 1 strip by Subdermal route Daily.,  Disp: , Rfl:   •  hydrochlorothiazide (HYDRODIURIL) 12.5 MG tablet, Take 12.5 mg by mouth Daily., Disp: , Rfl:   •  Lancet Devices misc, 1 Piece by Subdermal route Daily., Disp: , Rfl:   •  losartan (COZAAR) 100 MG tablet, Take 100 mg by mouth Daily., Disp: , Rfl:   •  metFORMIN ER (GLUCOPHAGE-XR) 750 MG 24 hr tablet, Take 1 tablet by mouth Daily With Dinner., Disp: 30 tablet, Rfl: 1  •  rosuvastatin (CRESTOR) 20 MG tablet, Take 1 tablet by mouth Every Night., Disp: 30 tablet, Rfl: 1    No Known Allergies    Immunization History   Administered Date(s) Administered   • Hepatitis B 02/24/2017   • TD Preservative Free 06/01/2006   • Tdap 11/18/2016       Ambulatory progress note signed and attested to by Miguel Angel Marquez D.O.

## 2018-06-19 NOTE — PATIENT INSTRUCTIONS
Diabetes Mellitus and Nutrition  When you have diabetes (diabetes mellitus), it is very important to have healthy eating habits because your blood sugar (glucose) levels are greatly affected by what you eat and drink. Eating healthy foods in the appropriate amounts, at about the same times every day, can help you:  · Control your blood glucose.  · Lower your risk of heart disease.  · Improve your blood pressure.  · Reach or maintain a healthy weight.    Every person with diabetes is different, and each person has different needs for a meal plan. Your health care provider may recommend that you work with a diet and nutrition specialist (dietitian) to make a meal plan that is best for you. Your meal plan may vary depending on factors such as:  · The calories you need.  · The medicines you take.  · Your weight.  · Your blood glucose, blood pressure, and cholesterol levels.  · Your activity level.  · Other health conditions you have, such as heart or kidney disease.    How do carbohydrates affect me?  Carbohydrates affect your blood glucose level more than any other type of food. Eating carbohydrates naturally increases the amount of glucose in your blood. Carbohydrate counting is a method for keeping track of how many carbohydrates you eat. Counting carbohydrates is important to keep your blood glucose at a healthy level, especially if you use insulin or take certain oral diabetes medicines.  It is important to know how many carbohydrates you can safely have in each meal. This is different for every person. Your dietitian can help you calculate how many carbohydrates you should have at each meal and for snack.  Foods that contain carbohydrates include:  · Bread, cereal, rice, pasta, and crackers.  · Potatoes and corn.  · Peas, beans, and lentils.  · Milk and yogurt.  · Fruit and juice.  · Desserts, such as cakes, cookies, ice cream, and candy.    How does alcohol affect me?  Alcohol can cause a sudden decrease in  "blood glucose (hypoglycemia), especially if you use insulin or take certain oral diabetes medicines. Hypoglycemia can be a life-threatening condition. Symptoms of hypoglycemia (sleepiness, dizziness, and confusion) are similar to symptoms of having too much alcohol.  If your health care provider says that alcohol is safe for you, follow these guidelines:  · Limit alcohol intake to no more than 1 drink per day for nonpregnant women and 2 drinks per day for men. One drink equals 12 oz of beer, 5 oz of wine, or 1½ oz of hard liquor.  · Do not drink on an empty stomach.  · Keep yourself hydrated with water, diet soda, or unsweetened iced tea.  · Keep in mind that regular soda, juice, and other mixers may contain a lot of sugar and must be counted as carbohydrates.    What are tips for following this plan?  Reading food labels  · Start by checking the serving size on the label. The amount of calories, carbohydrates, fats, and other nutrients listed on the label are based on one serving of the food. Many foods contain more than one serving per package.  · Check the total grams (g) of carbohydrates in one serving. You can calculate the number of servings of carbohydrates in one serving by dividing the total carbohydrates by 15. For example, if a food has 30 g of total carbohydrates, it would be equal to 2 servings of carbohydrates.  · Check the number of grams (g) of saturated and trans fats in one serving. Choose foods that have low or no amount of these fats.  · Check the number of milligrams (mg) of sodium in one serving. Most people should limit total sodium intake to less than 2,300 mg per day.  · Always check the nutrition information of foods labeled as \"low-fat\" or \"nonfat\". These foods may be higher in added sugar or refined carbohydrates and should be avoided.  · Talk to your dietitian to identify your daily goals for nutrients listed on the label.  Shopping  · Avoid buying canned, premade, or processed foods. " These foods tend to be high in fat, sodium, and added sugar.  · Shop around the outside edge of the grocery store. This includes fresh fruits and vegetables, bulk grains, fresh meats, and fresh dairy.  Cooking  · Use low-heat cooking methods, such as baking, instead of high-heat cooking methods like deep frying.  · Cook using healthy oils, such as olive, canola, or sunflower oil.  · Avoid cooking with butter, cream, or high-fat meats.  Meal planning  · Eat meals and snacks regularly, preferably at the same times every day. Avoid going long periods of time without eating.  · Eat foods high in fiber, such as fresh fruits, vegetables, beans, and whole grains. Talk to your dietitian about how many servings of carbohydrates you can eat at each meal.  · Eat 4-6 ounces of lean protein each day, such as lean meat, chicken, fish, eggs, or tofu. 1 ounce is equal to 1 ounce of meat, chicken, or fish, 1 egg, or 1/4 cup of tofu.  · Eat some foods each day that contain healthy fats, such as avocado, nuts, seeds, and fish.  Lifestyle    · Check your blood glucose regularly.  · Exercise at least 30 minutes 5 or more days each week, or as told by your health care provider.  · Take medicines as told by your health care provider.  · Do not use any products that contain nicotine or tobacco, such as cigarettes and e-cigarettes. If you need help quitting, ask your health care provider.  · Work with a counselor or diabetes educator to identify strategies to manage stress and any emotional and social challenges.  What are some questions to ask my health care provider?  · Do I need to meet with a diabetes educator?  · Do I need to meet with a dietitian?  · What number can I call if I have questions?  · When are the best times to check my blood glucose?  Where to find more information:  · American Diabetes Association: diabetes.org/food-and-fitness/food  · Academy of Nutrition and Dietetics:  www.eatright.org/resources/health/diseases-and-conditions/diabetes  · National [place] of Diabetes and Digestive and Kidney Diseases (NIH): www.niddk.nih.gov/health-information/diabetes/overview/diet-eating-physical-activity  Summary  · A healthy meal plan will help you control your blood glucose and maintain a healthy lifestyle.  · Working with a diet and nutrition specialist (dietitian) can help you make a meal plan that is best for you.  · Keep in mind that carbohydrates and alcohol have immediate effects on your blood glucose levels. It is important to count carbohydrates and to use alcohol carefully.  This information is not intended to replace advice given to you by your health care provider. Make sure you discuss any questions you have with your health care provider.  Document Released: 09/14/2006 Document Revised: 01/22/2018 Document Reviewed: 01/22/2018  [employer] Interactive Patient Education © 2018 [employer]

## 2018-06-26 ENCOUNTER — OFFICE VISIT (OUTPATIENT)
Dept: INTERNAL MEDICINE | Facility: CLINIC | Age: 48
End: 2018-06-26

## 2018-06-26 VITALS
SYSTOLIC BLOOD PRESSURE: 142 MMHG | HEIGHT: 68 IN | HEART RATE: 64 BPM | DIASTOLIC BLOOD PRESSURE: 84 MMHG | BODY MASS INDEX: 33.1 KG/M2 | OXYGEN SATURATION: 98 % | WEIGHT: 218.4 LBS | TEMPERATURE: 97.9 F

## 2018-06-26 DIAGNOSIS — B35.6 TINEA CRURIS: ICD-10-CM

## 2018-06-26 DIAGNOSIS — E11.9 TYPE 2 DIABETES MELLITUS WITHOUT COMPLICATION, UNSPECIFIED LONG TERM INSULIN USE STATUS: Primary | ICD-10-CM

## 2018-06-26 DIAGNOSIS — I10 ESSENTIAL HYPERTENSION: ICD-10-CM
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
## 2018-06-26 PROCEDURE — 99214 OFFICE O/P EST MOD 30 MIN: CPT | Performed by: FAMILY MEDICINE

## 2018-06-26 RX ORDER — HYDROCHLOROTHIAZIDE 12.5 MG/1
12.5 TABLET ORAL DAILY
Qty: 30 TABLET | Refills: 2 | Status: SHIPPED | OUTPATIENT
Start: 2018-06-26 | End: 2018-10-05

## 2018-06-26 RX ORDER — GLIPIZIDE 10 MG/1
TABLET, FILM COATED, EXTENDED RELEASE ORAL
COMMUNITY
End: 2018-06-26 | Stop reason: SDUPTHER

## 2018-06-26 RX ORDER — PRENATAL VIT 91/IRON/FOLIC/DHA 28-975-200
COMBINATION PACKAGE (EA) ORAL 2 TIMES DAILY
Qty: 36 G | Refills: 0 | Status: SHIPPED | OUTPATIENT
Start: 2018-06-26 | End: 2018-08-13

## 2018-06-26 RX ORDER — METFORMIN HYDROCHLORIDE 750 MG/1
TABLET, EXTENDED RELEASE ORAL
COMMUNITY
End: 2018-06-26 | Stop reason: SDUPTHER

## 2018-06-26 RX ORDER — ROSUVASTATIN CALCIUM 20 MG/1
TABLET, COATED ORAL
COMMUNITY
End: 2018-06-26 | Stop reason: SDUPTHER

## 2018-06-26 RX ORDER — TRIAMCINOLONE ACETONIDE 0.25 MG/G
OINTMENT TOPICAL 3 TIMES DAILY
Qty: 80 G | Refills: 0 | Status: SHIPPED | OUTPATIENT
Start: 2018-06-26 | End: 2018-08-13

## 2018-06-26 RX ORDER — LOSARTAN POTASSIUM 100 MG/1
100 TABLET ORAL DAILY
Qty: 30 TABLET | Refills: 2 | Status: SHIPPED | OUTPATIENT
Start: 2018-06-26 | End: 2018-11-07

## 2018-06-26 NOTE — PROGRESS NOTES
Subjective   Sandra Colon is a 48 y.o. male.     Chief Complaint   Patient presents with   • Diabetes     follow up       History of Present Illness     Sandra Colon presents today for Follow-up on his diabetes.    He is taking the metformin and glipizide.  He noted some initial abdominal upset with metformin but this has resolved.  His blood sugars are ranging around 200-350.  The average is down some, mainly has gotten rid of the levels over 500 from his visit last week.  He denies any current symptoms.    He does notice a rash on his forearms and legs.  This has been present ever since he stated a hotel over the weekend.  It is itching.  He states that the hotel did not seem very clean and he is concerned he may have gotten something from there.  No one in his household is currently having any issues.  He has not noticed any bedbugs at home.    Lastly mentions a rash on his distal penile shaft underneath the foreskin.  It has been present for a couple of weeks now and is worsening.  He has never had anything like this before.  He thinks it may have something to do with his diabetes and I agree likely a fungal infection secondary to persistent hyperglycemia and hygiene.    The following portions of the patient's history were reviewed and updated as appropriate: allergies, current medications, past family history, past medical history, past social history, past surgical history and problem list.    Active Ambulatory Problems     Diagnosis Date Noted   • Hypertension    • Fatty liver    • Hyperlipidemia    • Adrenal nodule 10/15/2017   • Renal mass 06/11/2018   • Gross hematuria 06/11/2018   • Type 2 diabetes mellitus without complication 06/11/2018     Resolved Ambulatory Problems     Diagnosis Date Noted   • No Resolved Ambulatory Problems     Past Medical History:   Diagnosis Date   • Adrenal tumor    • Diabetes mellitus    • Fatty liver    • Hyperlipidemia    • Hypertension    • Kidney tumor      Past  "Surgical History:   Procedure Laterality Date   • CYST REMOVAL Left     on wrist   • KNEE SURGERY Left      Family History   Problem Relation Age of Onset   • No Known Problems Mother    • Diabetes Father    • No Known Problems Sister    • No Known Problems Brother    • Cancer Paternal Grandmother    • Diabetes Paternal Grandfather      Social History     Social History   • Marital status:      Spouse name: N/A   • Number of children: N/A   • Years of education: N/A     Occupational History   • Not on file.     Social History Main Topics   • Smoking status: Never Smoker   • Smokeless tobacco: Never Used   • Alcohol use 1.2 oz/week     2 Standard drinks or equivalent per week      Comment: only on weekends, none in 2 weeks   • Drug use: No   • Sexual activity: Defer     Other Topics Concern   • Not on file     Social History Narrative    Lives in [place] with wife and 2 kids         Review of Systems   Respiratory: Negative.    Cardiovascular: Negative.    Endocrine: Negative.    Skin: Positive for rash. Negative for color change.   Neurological: Negative.        Objective   Blood pressure 142/84, pulse 64, temperature 97.9 °F (36.6 °C), temperature source Temporal Artery , height 172.7 cm (68\"), weight 99.1 kg (218 lb 6.4 oz), SpO2 98 %.  Nursing note reviewed  Physical Exam  Const: NAD, A&Ox4, Pleasant, Cooperative  Eyes: EOMI, no conjunctivitis  ENT: No nasal discharge present, neck supple  Cardiac: Regular rate and rhythm, no peripheral edema or cyanosis  Resp: Respiratory rate within normal limits, no increased work of breathing, no audible wheezing or retractions noted  GI: No distention or ascites  MSK: Motor and sensation grossly intact in bilateral upper extremities  Neurologic, CN II-XII grossly intact  Psych: Appropriate mood and behavior.  Skin: Pink, warm, dry.  He has some numerous punctate erythematous lesions with excoriation on his forearms and legs.  There is a papular squamous lesion " with surrounding erythema of the distal shaft of the penis.  Procedures  Assessment/Plan   Sandra was seen today for diabetes.    Diagnoses and all orders for this visit:    Type 2 diabetes mellitus without complication, unspecified long term insulin use status  -     SITagliptin (JANUVIA) 50 MG tablet; Take 1 tablet by mouth Daily.    Essential hypertension  -     losartan (COZAAR) 100 MG tablet; Take 1 tablet by mouth Daily.  -     hydrochlorothiazide (HYDRODIURIL) 12.5 MG tablet; Take 1 tablet by mouth Daily.    Insect bite, initial encounter  -     triamcinolone (KENALOG) 0.025 % ointment; Apply  topically 3 (Three) Times a Day.    Tinea cruris  -     terbinafine (lamISIL) 1 % cream; Apply  topically 2 (Two) Times a Day.      Diabetic Health Maintenance  Type 2, insulin-dependent  - Diagnosed: 6/11/18  Seeing endocrine: No   - Insulin regimen: None  Other pertinent medications: Metformin 750 mg once a day, glipizide 10 mg once a day  Known complications: None  FSBS range: Ranging between 200-350  - Checking: Sporadically, typically once per day  Last yearly dilated eye exam: Never  - Last/next yearly ophthalmologist visit: Referral placed last visit  Last monofilament foot exam: Never  - Seen by podiatry: No   Lab Results   Component Value Date    HGBA1C 11.90 (H) 06/11/2018     Lab Results   Component Value Date    CREATININE 0.91 06/13/2018     Last microalbuminuria screening: Order placed Asked visit  Last visit with dietician: Spoke with a dietitian in the hospital  Current blood pressure: 142/84 today  Assessment: Diabetes Mellitus: Last A1c 11.9%   Plan:  · He will continue the metformin extended release 750 mg daily as well as the glipizide 10 mg daily  · His blood sugar ranges have been better controlled in the 200-350 range, but not quite reaching where would be ideal for him.  We will add sitagliptin 50 mg daily  · I'd like to see him again in a few weeks to see how he is tolerating his medications.   We will do a repeat A1c approximately in September.  · He was started on rosuvastatin 20 mg at his last visit.  We have no lipid results on file, but we will try to get them from his primary care provider.  I would like to see at least a 50% reduction in his LDL from the pre-medication level.     #2 renal mass  · Managed by Dr. Medrano.  He should continue to follow-up with him.    #3 class I obesity  BMI Readings from Last 3 Encounters:   06/19/18 33.03 kg/m²   06/11/18 34.06 kg/m²   05/30/18 35.73 kg/m²   · Already doing very well on weight loss.  He has completely overhauled his diet.  Diabetic diet was reviewed in detail with him today.  · He should continue his weight loss efforts.  We will see him again in 3 months.    #4 hypertension  · Doing well, A little above goal today 142/88  · Evidently the patient was not taking the blood pressure medications after his hospitalization.  I have reordered him his medications and he should start taking the losartan 100 mg tablet as well as HCTZ 12.5 mg tablet    #5 tinea cruris  · Mainly affecting the distal shaft under the foreskin  · I have prescribed him a antifungal medication    #6 bed bug bites  · Sustained after staying in a hotel  · Triamcinolone cream prescribed for him today    Patient Instructions   Call in about 2 weeks with blood sugar ranges.      Current Outpatient Prescriptions:   •  glipiZIDE (GLUCOTROL XL) 10 MG 24 hr tablet, Take 1 tablet by mouth Every Morning Before Breakfast., Disp: 30 tablet, Rfl: 1  •  glucose blood test strip, 1 strip by Subdermal route Daily., Disp: , Rfl:   •  Lancet Devices misc, 1 Piece by Subdermal route Daily., Disp: , Rfl:   •  metFORMIN ER (GLUCOPHAGE-XR) 750 MG 24 hr tablet, Take 1 tablet by mouth Daily With Dinner., Disp: 30 tablet, Rfl: 1  •  rosuvastatin (CRESTOR) 20 MG tablet, Take 1 tablet by mouth Every Night., Disp: 30 tablet, Rfl: 1  •  hydrochlorothiazide (HYDRODIURIL) 12.5 MG tablet, Take 1 tablet by mouth Daily.,  Disp: 30 tablet, Rfl: 2  •  losartan (COZAAR) 100 MG tablet, Take 1 tablet by mouth Daily., Disp: 30 tablet, Rfl: 2  •  SITagliptin (JANUVIA) 50 MG tablet, Take 1 tablet by mouth Daily., Disp: 30 tablet, Rfl: 2  •  terbinafine (lamISIL) 1 % cream, Apply  topically 2 (Two) Times a Day., Disp: 36 g, Rfl: 0  •  triamcinolone (KENALOG) 0.025 % ointment, Apply  topically 3 (Three) Times a Day., Disp: 80 g, Rfl: 0    No Known Allergies    Immunization History   Administered Date(s) Administered   • Hepatitis B 02/24/2017   • TD Preservative Free 06/01/2006   • Tdap 11/18/2016       Ambulatory progress note signed and attested to by Miguel Angel Marquez D.O.

## 2018-07-09 ENCOUNTER — TELEPHONE (OUTPATIENT)
Dept: INTERNAL MEDICINE | Facility: CLINIC | Age: 48
End: 2018-07-09

## 2018-07-09 NOTE — TELEPHONE ENCOUNTER
DR MUSTAFA PATIENT     PT CALLED STATES DR MUSTAFA TOLD HIM TO CALL  --- WAS PLACED ON DIABETES MEDS AND ASSUMES IT WAS TO TALK ABOUT THIS AND SEE IF THIS IS WORKING     PLEAS CALL [phone]

## 2018-07-13 NOTE — TELEPHONE ENCOUNTER
Called and spoke to pt and he advised that he has noticed an improvement and numbers have been better, he stated that with the new medicine he has been experiencing a headache and some dizziness, he stated that he feels a lot of pressure in his head. He advised that he is thought at first it was his bp but when he checked it it was 128/80, advised pt would send this information on to Dr Marquez and will call him back with his recommendations, pt voiced understanding

## 2018-07-13 NOTE — TELEPHONE ENCOUNTER
Mainly wanted to see what his blood sugars have been doing since being on the new medications.  As long as he has noticed an improvement and has not had any hypoglycemic episodes, he should continue his current care.

## 2018-07-13 NOTE — TELEPHONE ENCOUNTER
Headache is a common side effect of sitagliptin which should go away over the next couple of weeks as he continues to medication.  He can take Tylenol as needed for the headache, otherwise continue as previously and I will see him at his next appointment.

## 2018-08-13 ENCOUNTER — OFFICE VISIT (OUTPATIENT)
Dept: ENDOCRINOLOGY | Facility: CLINIC | Age: 48
End: 2018-08-13

## 2018-08-13 VITALS
OXYGEN SATURATION: 98 % | BODY MASS INDEX: 35.01 KG/M2 | DIASTOLIC BLOOD PRESSURE: 86 MMHG | WEIGHT: 231 LBS | HEIGHT: 68 IN | HEART RATE: 64 BPM | SYSTOLIC BLOOD PRESSURE: 130 MMHG

## 2018-08-13 DIAGNOSIS — E27.8 ADRENAL NODULE (HCC): Primary | ICD-10-CM

## 2018-08-13 DIAGNOSIS — E11.9 TYPE 2 DIABETES MELLITUS WITHOUT COMPLICATION, WITHOUT LONG-TERM CURRENT USE OF INSULIN (HCC): ICD-10-CM

## 2018-08-13 LAB
GLUCOSE BLDC GLUCOMTR-MCNC: 126 MG/DL (ref 70–130)
HBA1C MFR BLD: 8 %

## 2018-08-13 PROCEDURE — 82947 ASSAY GLUCOSE BLOOD QUANT: CPT | Performed by: INTERNAL MEDICINE

## 2018-08-13 PROCEDURE — 83036 HEMOGLOBIN GLYCOSYLATED A1C: CPT | Performed by: INTERNAL MEDICINE

## 2018-08-13 PROCEDURE — 99214 OFFICE O/P EST MOD 30 MIN: CPT | Performed by: INTERNAL MEDICINE

## 2018-08-13 NOTE — PROGRESS NOTES
"Chief Complaint   Patient presents with   • Adrenal nodule     Patient in today for f/u on Adrenal nodule        HPI:   Sandra Colon is a 48 y.o.male who returns to Endocrine Clinic for f/u evaluation of his left adrenal nodule. Last visit 10/12/2017.  His history is as follows:    Interim Events:   - presented to the  ED on 05/30/2018 with cough and abdominal pain. CT scan of the Abdomen showed again the left adrenal nodule that had increased in size since 2017 and a new finding of a left renal mass that was not present on prior imaging. Impression: \" 5.1 x 3.3 cm septated hypoattenuating left adrenal mass, enlarged from comparison study. Findings support primary adrenal neoplasm such as   pheochromocytoma. Recommend further evaluation.\" and   \"3.4 cm hypoattenuating lesion within the upper pole of the left kidney,   best visualized on the delayed images (series 6, image 38), new from comparison study. Renal carcinoma possible. Recommend further evaluation.\"    - Pt had 24 hour urine metanephrines completed at [facility] - per my conversation with Dr. Medrano Urology, total metanephrines were normal but normetanephrines were in the 900's    - pt then presented to the hospital again with episode of gross hematuria. He was admitted for further evaluation.   He had a CT guided FNA of the left adrenal nodule (06/12/2018) - pathology report: \"blood and fragments of adrenal gland, no tumor seen\" In addition he was noted to have uncontrolled HTN and a new diagnosis of uncontrolled diabetes.     - His new PCP, Dr. Miguel Angel Marquez, prescribed three diabetic oral agents and two HTN agents    1) left adrenal nodule:  - Patient was initially evaluated by, gastroenterologist,  for diarrhea and elevated hepatic transaminases.  [place] patient [place] a city  and had a needle stick while in a  pipe in March 2017.  He received HIV prophylaxis medication but stopped the prescription after a few weeks due to " "intolerance. A month after stopping the medication he developed diarrhea that was persistent for several weeks.    - As part of his evaluation he had a colonoscopy and an ultrasound of the abdomen completed at [facility] radiology on 8/29/17.  The ultrasound report had described a left kidney nodule that was later confirmed to be an actual left adrenal nodule on CT scan of the abdomen:   \"The right adrenal gland is normal. There is a low dense mass in the left adrenal gland measuring 3.0 x 4.3 cm. On the unenhanced image the adrenal mass has an attenuation coefficient of 28.5 Hounsfield units. The arterial phase demonstrates an attenuation coefficient of 33.2 Hounsfield units. The attenuation on venous imaging is 29.8 Hounsfield units and on delayed imaging 35.18 Hounsfield units. The mass demonstrates a thin rim of contrast enhancement primarily posteriorly.\"     The patient's medical history significant for hypertension diagnosed in his late 30s or early 40s.  He reports taking his HTN medication intermittently. He previously would stop his BP meds when BP readings were normal. He denied episodes of headaches, anxiety, or diaphoresis in the past. Was seen in ED in 02/2017 for headache, chest pain, elevated BP. Had stress test and echo which were WNL.     Initial Endocrine Visit: (10/2017)  Lab evaluation at that time showed: normal plasma metanephrines  Normetanephrine 0 - 145 pg/mL 101    Metanephrine 0 - 62 pg/mL <10    - 8AM renin level of 0.186, and aldosterone of 13.1, normal renal function and K+   Pt could not complete a 1 mg overnight dexamethasone suppression test or repeat aldosterone/renin as planned    Interim Events: see above  On further review, pt reports more frequent headaches. His BG and BP are \"okay\" when these headaches occur    2) essential HTN:   Currently taking HCTZ 12.5 mg daily, losartan 100 mg daily  - tolerating these medications    3) Type 2 diabetes with no known associated " complications at this time:  - diagnosed in June 2018 - A1C% 11.90  - prescribed 3 oral agents by his new PCP    Current DM Medications:  - metformin  mg daily  - glipizide XL 10 mg daily  - Januvia 50 mg daily    Glucometer Review: no meter for review    DM Health Maintenance:  Ophtho: Last visit - due  Podiatry: Last visit - N/A  Monofilament / Foot exam: due  Lipids: pending, was prescribed crestor 20 mg by PCP  Urine Microalb/Cr ratio: due  TSH: (06/2018) - normal    FMHx: He has a family history of diabetes but reports no known family history of hypertension    Review of Systems   Constitutional: Positive for fatigue.   Eyes: Negative.  Negative for itching.   Respiratory: Negative.    Cardiovascular: Positive for leg swelling (feet swell intermittently).   Gastrointestinal: Positive for abdominal pain.   Endocrine: Positive for polyuria.   Genitourinary: Positive for frequency.   Musculoskeletal: Negative.    Skin: Negative.    Allergic/Immunologic: Negative.    Neurological: Positive for headaches.   Hematological: Negative.    Psychiatric/Behavioral: Negative.      The following portions of the patient's history were reviewed and updated as appropriate: allergies, current medications, past family history, past medical history, past social history, past surgical history and problem list.    Vitals:    08/13/18 1433   BP: 130/86   Pulse: 64   SpO2: 98%     Physical Exam   Constitutional: He is oriented to person, place, and time. He appears well-developed. No distress.   obese   HENT:   Head: Normocephalic.   Mouth/Throat: Oropharynx is clear and moist.   Eyes: Pupils are equal, round, and reactive to light. Conjunctivae and EOM are normal.   Neck: Neck supple. No tracheal deviation present. No thyromegaly present.   No palpable thyroid nodules     Cardiovascular: Normal rate, regular rhythm and normal heart sounds.    No murmur heard.  Pulmonary/Chest: Effort normal and breath sounds normal. No  "respiratory distress.   Abdominal: Soft. Bowel sounds are normal. He exhibits no mass. There is no tenderness.   Lymphadenopathy:     He has no cervical adenopathy.   Neurological: He is alert and oriented to person, place, and time. No cranial nerve deficit.   Skin: Skin is warm and dry. He is not diaphoretic. No erythema.   Acanthosis nigricans   Psychiatric: He has a normal mood and affect. His behavior is normal.   Vitals reviewed.    LABS/IMAGING: outside records reviewed and summarized in HPI  Office Visit on 08/13/2018   Component Date Value Ref Range Status   • Glucose 08/13/2018 126  70 - 130 mg/dL Final   • Hemoglobin A1C 08/13/2018 8.0  % Final       ASSESSMENT/PLAN:  1) left adrenal nodule:  - presented to the  ED on 05/30/2018 with cough and abdominal pain. CT scan of the Abdomen showed again the left adrenal nodule that had increased in size since 2017 and a new finding of a left renal mass that was not present on prior imaging.  - The adrenal nodule was 3.0 X 4.3 cm in the 2017 CT with contrast did not show washout of > 50%, but margin was regular.  Imaging now showing \"5.1 x 3.3 cm septated hypoattenuating left adrenal mass, enlarged from comparison study\"  and a  \"3.4 cm hypoattenuating lesion within the upper pole of the left kidney\"     - Discussed with patient that repeat biochemical evaluation of the adrenal nodule needs to be repeated off any interfering medications.  - will have him complete an 8AM aldosterone, renin level, fractionated plasma metanephrines, and renal fxn panel  - will also have him complete a 1 mg dexamethasone suppression test.    Discussed case with Dr. Dariusz Medrano, pt's Urologist. Given pt's normal plasma metanephrines in 09/2017, I have a lower suspicion for pheochromocytoma. Discussed that a positive test for 24-hour urine fractionated metanephrines would be a Normetanephrine >900 mcg/24 hours or metanephrine >400 mcg/24 hours. His recent urine normetanephrine level " was in the low 900's. Will contact Dr. Medrano with lab results once evaluation has been completed.     2) essential HTN: fair control  Currently taking HCTZ 12.5 mg daily, losartan 100 mg daily  - tolerating these medications  - Pt to continue these Rx's    3) Type 2 diabetes with no known associated complications at this time: uncontrolled but improving, A1C% now 8.0 down from 11.90  - will contnue metformin  mg daily  - will continue glipizide XL 10 mg daily  - pt on dose of Januvia (50 mg daily) indicated for renal impairment. Pt with normal renal function/ normal GFR. Will change to 100 mg daily    F/U 4 months    ADDENDUM: lab results -  Fractionated plasma metanephrines collected twice: Confirmed that patient had not taken any medications to cause a false positive results (verfiied no Tylenol).    08/14/2018 (8:04 AM):   Normetanephrine 373 (0 - 145), Metanephrine 22 (0 - 62)  Aldosterone 9.8  Renin 0.262 (0.167 - 5.380)  AM Cortisol (1 mg dex suppression test): 0.70, Appropriately suppressed to <1.8 after 1 mg overnight dexamethasone suppression test    08/20/2018 (8:40 AM): normetanephrine 469 (0 - 145), metanephrine 29 (0 - 62)    Discussed case with Dr. Medrano, Urology. The results show no evidence of excess cortisol or aldosterone production. While the mildly elevated plasma normetanephrines are not diagnostic for classic pheochromocytoma (ie levels usually significantly higher), the moderate elevation is still of concern. Because the patient will be undergoing adrenalectomy based on the size of the adenoma, will start pt on alpha blockade 2 weeks prior to surgery.

## 2018-08-14 ENCOUNTER — LAB (OUTPATIENT)
Dept: INTERNAL MEDICINE | Facility: CLINIC | Age: 48
End: 2018-08-14

## 2018-08-14 DIAGNOSIS — E27.8 ADRENAL NODULE (HCC): ICD-10-CM

## 2018-08-14 LAB — CORTIS AM PEAK SERPL-MCNC: 0.7 MCG/DL (ref 4.3–22.4)

## 2018-08-14 PROCEDURE — 82088 ASSAY OF ALDOSTERONE: CPT | Performed by: INTERNAL MEDICINE

## 2018-08-14 PROCEDURE — 84244 ASSAY OF RENIN: CPT | Performed by: INTERNAL MEDICINE

## 2018-08-14 PROCEDURE — 83835 ASSAY OF METANEPHRINES: CPT | Performed by: INTERNAL MEDICINE

## 2018-08-14 PROCEDURE — 82533 TOTAL CORTISOL: CPT | Performed by: INTERNAL MEDICINE

## 2018-08-16 LAB — RENIN PLAS-CCNC: 0.26 NG/ML/HR (ref 0.17–5.38)

## 2018-08-17 LAB
ALDOST SERPL-MCNC: 9.8 NG/DL (ref 0–30)
METANEPHRINE, PL: 22 PG/ML (ref 0–62)
NORMETANEPHRINE, PL: 373 PG/ML (ref 0–145)

## 2018-08-19 ENCOUNTER — TELEPHONE (OUTPATIENT)
Dept: ENDOCRINOLOGY | Facility: CLINIC | Age: 48
End: 2018-08-19

## 2018-08-20 ENCOUNTER — LAB (OUTPATIENT)
Dept: INTERNAL MEDICINE | Facility: CLINIC | Age: 48
End: 2018-08-20

## 2018-08-20 DIAGNOSIS — E27.8 ADRENAL NODULE (HCC): Primary | ICD-10-CM

## 2018-08-20 DIAGNOSIS — E27.8 ADRENAL NODULE (HCC): ICD-10-CM

## 2018-08-20 PROCEDURE — 83835 ASSAY OF METANEPHRINES: CPT | Performed by: INTERNAL MEDICINE

## 2018-08-25 LAB
METANEPHRINE, PL: 29 PG/ML (ref 0–62)
NORMETANEPHRINE, PL: 469 PG/ML (ref 0–145)

## 2018-08-28 ENCOUNTER — TELEPHONE (OUTPATIENT)
Dept: ENDOCRINOLOGY | Facility: CLINIC | Age: 48
End: 2018-08-28

## 2018-08-28 NOTE — TELEPHONE ENCOUNTER
----- Message from Roma Abbott MD sent at 8/28/2018  1:25 AM EDT -----  Pt had some 24 hour urine studies completed at [facility] in June or July of 2018. Could you track them down.  Thanks!  MD         Received labs from [facility] and they didn't have any urine studies , they sent over labs that patient had done from 8/10/2018 and 7/18/2018.Camelia states that is all the labs that patient has had done since 2014. Put in yellow folder to be reviewed.

## 2018-08-29 ENCOUNTER — TELEPHONE (OUTPATIENT)
Dept: ENDOCRINOLOGY | Facility: CLINIC | Age: 48
End: 2018-08-29

## 2018-08-29 NOTE — TELEPHONE ENCOUNTER
Spoke with Tomasa at Dr. Dariusz Medrano office (urologist) and they are faxing over urine studies that patient had done in 7/2018.

## 2018-09-20 ENCOUNTER — LAB (OUTPATIENT)
Dept: LAB | Facility: HOSPITAL | Age: 48
End: 2018-09-20

## 2018-09-20 ENCOUNTER — OFFICE VISIT (OUTPATIENT)
Dept: FAMILY MEDICINE CLINIC | Facility: CLINIC | Age: 48
End: 2018-09-20

## 2018-09-20 VITALS
WEIGHT: 233 LBS | HEART RATE: 61 BPM | SYSTOLIC BLOOD PRESSURE: 134 MMHG | OXYGEN SATURATION: 98 % | HEIGHT: 68 IN | DIASTOLIC BLOOD PRESSURE: 80 MMHG | BODY MASS INDEX: 35.31 KG/M2

## 2018-09-20 DIAGNOSIS — E78.5 HYPERLIPIDEMIA, UNSPECIFIED HYPERLIPIDEMIA TYPE: ICD-10-CM

## 2018-09-20 DIAGNOSIS — I10 ESSENTIAL HYPERTENSION: ICD-10-CM

## 2018-09-20 DIAGNOSIS — E11.9 TYPE 2 DIABETES MELLITUS WITHOUT COMPLICATION, WITHOUT LONG-TERM CURRENT USE OF INSULIN (HCC): Primary | ICD-10-CM

## 2018-09-20 LAB
25(OH)D3 SERPL-MCNC: 19.9 NG/ML
ALBUMIN SERPL-MCNC: 4.04 G/DL (ref 3.2–4.8)
ALBUMIN/GLOB SERPL: 1.6 G/DL (ref 1.5–2.5)
ALP SERPL-CCNC: 113 U/L (ref 25–100)
ALT SERPL W P-5'-P-CCNC: 193 U/L (ref 7–40)
ANION GAP SERPL CALCULATED.3IONS-SCNC: 6 MMOL/L (ref 3–11)
ARTICHOKE IGE QN: 106 MG/DL (ref 0–130)
AST SERPL-CCNC: 93 U/L (ref 0–33)
BILIRUB SERPL-MCNC: 0.4 MG/DL (ref 0.3–1.2)
BUN BLD-MCNC: 12 MG/DL (ref 9–23)
BUN/CREAT SERPL: 14.3 (ref 7–25)
CALCIUM SPEC-SCNC: 9.1 MG/DL (ref 8.7–10.4)
CHLORIDE SERPL-SCNC: 107 MMOL/L (ref 99–109)
CHOLEST SERPL-MCNC: 144 MG/DL (ref 0–200)
CO2 SERPL-SCNC: 28 MMOL/L (ref 20–31)
CREAT BLD-MCNC: 0.84 MG/DL (ref 0.6–1.3)
GFR SERPL CREATININE-BSD FRML MDRD: 97 ML/MIN/1.73
GLOBULIN UR ELPH-MCNC: 2.6 GM/DL
GLUCOSE BLD-MCNC: 147 MG/DL (ref 70–100)
HDLC SERPL-MCNC: 32 MG/DL (ref 40–60)
POTASSIUM BLD-SCNC: 3.7 MMOL/L (ref 3.5–5.5)
PROT SERPL-MCNC: 6.6 G/DL (ref 5.7–8.2)
SODIUM BLD-SCNC: 141 MMOL/L (ref 132–146)
TRIGL SERPL-MCNC: 180 MG/DL (ref 0–150)

## 2018-09-20 PROCEDURE — 99214 OFFICE O/P EST MOD 30 MIN: CPT | Performed by: FAMILY MEDICINE

## 2018-09-20 PROCEDURE — 80061 LIPID PANEL: CPT | Performed by: FAMILY MEDICINE

## 2018-09-20 PROCEDURE — 36415 COLL VENOUS BLD VENIPUNCTURE: CPT

## 2018-09-20 PROCEDURE — 80053 COMPREHEN METABOLIC PANEL: CPT | Performed by: FAMILY MEDICINE

## 2018-09-20 PROCEDURE — 82306 VITAMIN D 25 HYDROXY: CPT | Performed by: FAMILY MEDICINE

## 2018-09-20 NOTE — PROGRESS NOTES
Subjective   Sandra Colon is a 48 y.o. male.     Chief Complaint   Patient presents with   • Follow-up       History of Present Illness     Sandra Colon presents today for Follow-up on his diabetes.    He is taking the metformin and glipizide.  He saw endocrinology and was increased to 100 mg on his Januvia.  He noted some initial abdominal upset with metformin but this has resolved, as has the headache from Januvia.  His blood sugars are ranging around 70-90, with some slight hypoglycemia down to 50.  The average is down some, mainly has gotten rid of the levels over 500 from his visit last week.  He denies any current symptoms.  His last A1c was 8.0% at endocrinology in August.  He does endorse some slight paresthesias of the left side of his face, which have been going on ever since starting his new medications back in June.    The following portions of the patient's history were reviewed and updated as appropriate: allergies, current medications, past family history, past medical history, past social history, past surgical history and problem list.    Active Ambulatory Problems     Diagnosis Date Noted   • Hypertension    • Fatty liver    • Hyperlipidemia    • Adrenal nodule (CMS/HCC) 10/15/2017   • Renal mass 06/11/2018   • Gross hematuria 06/11/2018   • Type 2 diabetes mellitus without complication (CMS/HCC) 06/11/2018     Resolved Ambulatory Problems     Diagnosis Date Noted   • No Resolved Ambulatory Problems     Past Medical History:   Diagnosis Date   • Adrenal tumor    • Diabetes mellitus (CMS/HCC)    • Fatty liver    • Hyperlipidemia    • Hypertension    • Kidney tumor      Past Surgical History:   Procedure Laterality Date   • CYST REMOVAL Left     on wrist   • KNEE SURGERY Left      Family History   Problem Relation Age of Onset   • No Known Problems Mother    • Diabetes Father    • No Known Problems Sister    • No Known Problems Brother    • Cancer Paternal Grandmother    • Diabetes Paternal  "Grandfather      Social History     Social History   • Marital status:      Spouse name: N/A   • Number of children: N/A   • Years of education: N/A     Occupational History   • Not on file.     Social History Main Topics   • Smoking status: Never Smoker   • Smokeless tobacco: Never Used   • Alcohol use 1.2 oz/week     2 Standard drinks or equivalent per week      Comment: only on weekends, none in 2 weeks   • Drug use: No   • Sexual activity: Defer     Other Topics Concern   • Not on file     Social History Narrative    Lives in [place] with wife and 2 kids         Review of Systems   Respiratory: Negative.    Cardiovascular: Negative.    Endocrine: Negative.    Skin: Negative for color change and rash.   Neurological: Negative.        Objective   Blood pressure 134/80, pulse 61, height 172.7 cm (67.99\"), weight 106 kg (233 lb), SpO2 98 %.  Nursing note reviewed  Physical Exam  Const: NAD, A&Ox4, Pleasant, Cooperative  Eyes: EOMI, no conjunctivitis  ENT: No nasal discharge present, neck supple  Cardiac: Regular rate and rhythm, no peripheral edema or cyanosis  Resp: Respiratory rate within normal limits, no increased work of breathing, no audible wheezing or retractions noted  GI: No distention or ascites  MSK: Motor and sensation grossly intact in bilateral upper extremities  Neurologic, CN II-XII grossly intact  Psych: Appropriate mood and behavior.  Skin: Pink, warm, dry.  Procedures  Assessment/Plan   Sandra was seen today for follow-up.    Diagnoses and all orders for this visit:    Type 2 diabetes mellitus without complication, without long-term current use of insulin (CMS/[facility])    Hyperlipidemia, unspecified hyperlipidemia type  -     Lipid panel; Future  -     Comprehensive metabolic panel; Future  -     Vitamin D 25 hydroxy; Future    Essential hypertension  -     Comprehensive metabolic panel; Future  -     Vitamin D 25 hydroxy; Future      Diabetic Health Maintenance  Type 2, " insulin-dependent  - Diagnosed: 6/11/18  Seeing endocrine: Yes, last on 8/13/18   - Insulin regimen: None  Other pertinent medications: Metformin 750 mg once a day, glipizide 10 mg once a day, Januvia 100 mg  Known complications: None  FSBS range: Ranging between 70-90  - Checking: Sporadically, typically once per day  Last yearly dilated eye exam: Never  - Last/next yearly ophthalmologist visit: Referral placed last visit  Last monofilament foot exam: August 2018  - Seen by podiatry: No   Lab Results   Component Value Date    HGBA1C 8.0 08/13/2018     Lab Results   Component Value Date    CREATININE 0.91 06/13/2018   Last microalbuminuria screening: Order placed last visit  Last visit with dietician: Spoke with a dietitian in the hospital  Current blood pressure: 142/84 today  Assessment: Diabetes Mellitus: Last A1c 8.0%   Plan:  · He will continue the metformin extended release 750 mg daily as well as the glipizide 10 mg daily and Januvia 100 mg  · His blood sugar ranges have been very well-controlled, possibly with too much control considering his hypoglycemic episodes.  We will stop his glipizide given the concern for weight gain in addition to his hypoglycemia.  As fast as his A1c has come down from 11.9-8.0%, I think this will do fine particularly with the Januvia at full dose 100 mg.  · He is evidently having some trouble affording the Januvia which is apparently $400 per month.  He was given a co-pay card for this that should bring the cost down as well as a few weeks of samples of the Januvia 100 mg..  · He was started on rosuvastatin 20 mg at his initial visit.  We will recheck a lipid profile today.  I would like to see at least a 50% reduction in his LDL from the pre-medication level.     #2 renal mass  · Managed by endocrinology    #3 class I obesity  BMI Readings from Last 3 Encounters:   09/20/18 35.44 kg/m²   08/13/18 35.12 kg/m²   06/26/18 33.21 kg/m²   · Initially did well with weight loss, but has  regained all the weight he had lost up to June.  He is back up to 233 pounds with a BMI of 35.4.  · He should continue his weight loss efforts.  We will see him again in 3 months.    #4 hypertension  · Doing well, 134/80 today  · Evidently during the times when he is off of his but pressure medication his blood pressure actually does some better.  This may have something to do with his renal mass, any event I would like to bring his losartan down to 50 mg for the next couple of weeks to see how this does.  He will call with his values  · He should continue HCTZ 12.5 mg    Patient Instructions   1. Stop glipizide.    2. Cut losartan in half, take daily.    3. If numbness continues after stopping glipizide, call the office.    4. Labs today.    5. Continue other medications as listed.      Current Outpatient Prescriptions:   •  glucose blood test strip, 1 strip by Subdermal route Daily., Disp: , Rfl:   •  hydrochlorothiazide (HYDRODIURIL) 12.5 MG tablet, Take 1 tablet by mouth Daily., Disp: 30 tablet, Rfl: 2  •  Lancet Devices misc, 1 Piece by Subdermal route Daily., Disp: , Rfl:   •  losartan (COZAAR) 100 MG tablet, Take 1 tablet by mouth Daily., Disp: 30 tablet, Rfl: 2  •  metFORMIN ER (GLUCOPHAGE-XR) 750 MG 24 hr tablet, Take 1 tablet by mouth Daily With Dinner., Disp: 30 tablet, Rfl: 1  •  rosuvastatin (CRESTOR) 20 MG tablet, Take 1 tablet by mouth Every Night., Disp: 30 tablet, Rfl: 1  •  SITagliptin (JANUVIA) 100 MG tablet, Take 1 tablet by mouth Daily., Disp: 30 tablet, Rfl: 5    No Known Allergies    Immunization History   Administered Date(s) Administered   • Hepatitis B 02/24/2017   • TD Preservative Free 06/01/2006   • Tdap 11/18/2016       Ambulatory progress note signed and attested to by Miguel Angel Marquez D.O.

## 2018-09-20 NOTE — PATIENT INSTRUCTIONS
1. Stop glipizide.    2. Cut losartan in half, take daily.    3. If numbness continues after stopping glipizide, call the office.    4. Labs today.    5. Continue other medications as listed.

## 2018-09-24 DIAGNOSIS — E11.9 TYPE 2 DIABETES MELLITUS WITHOUT COMPLICATION, UNSPECIFIED LONG TERM INSULIN USE STATUS: ICD-10-CM

## 2018-09-25 RX ORDER — METFORMIN HYDROCHLORIDE 750 MG/1
TABLET, EXTENDED RELEASE ORAL
Qty: 30 TABLET | Refills: 5 | Status: SHIPPED | OUTPATIENT
Start: 2018-09-25 | End: 2019-03-13 | Stop reason: DRUGHIGH

## 2018-09-25 RX ORDER — ROSUVASTATIN CALCIUM 20 MG/1
TABLET, COATED ORAL
Qty: 30 TABLET | Refills: 5 | Status: SHIPPED | OUTPATIENT
Start: 2018-09-25 | End: 2019-03-13 | Stop reason: SDUPTHER

## 2018-10-05 DIAGNOSIS — E27.8 ADRENAL NODULE (HCC): Primary | ICD-10-CM

## 2018-10-05 RX ORDER — PHENOXYBENZAMINE HYDROCHLORIDE 10 MG/1
10 CAPSULE ORAL 2 TIMES DAILY
Qty: 28 CAPSULE | Refills: 0 | Status: SHIPPED | OUTPATIENT
Start: 2018-10-05 | End: 2018-10-08

## 2018-10-08 ENCOUNTER — TELEPHONE (OUTPATIENT)
Dept: ENDOCRINOLOGY | Facility: CLINIC | Age: 48
End: 2018-10-08

## 2018-10-08 DIAGNOSIS — E27.8 ADRENAL NODULE (HCC): Primary | ICD-10-CM

## 2018-10-08 RX ORDER — DOXAZOSIN MESYLATE 4 MG/1
2 TABLET ORAL NIGHTLY
Qty: 30 TABLET | Refills: 0 | Status: SHIPPED | OUTPATIENT
Start: 2018-10-08 | End: 2018-10-15

## 2018-10-08 NOTE — TELEPHONE ENCOUNTER
PA completed for patient's Phenoxybenzamine 10mg via covermymeds. Medication was approved with a 1400 copay. Informed Dr. Abbott of medication copay, after speaking with pharmacy. Pt will have a copay if medication qty was adjusted.

## 2018-10-15 ENCOUNTER — OFFICE VISIT (OUTPATIENT)
Dept: ENDOCRINOLOGY | Facility: CLINIC | Age: 48
End: 2018-10-15

## 2018-10-15 VITALS
HEIGHT: 68 IN | BODY MASS INDEX: 35.61 KG/M2 | SYSTOLIC BLOOD PRESSURE: 126 MMHG | WEIGHT: 235 LBS | OXYGEN SATURATION: 99 % | HEART RATE: 70 BPM | DIASTOLIC BLOOD PRESSURE: 90 MMHG

## 2018-10-15 DIAGNOSIS — E27.8 ADRENAL NODULE (HCC): Primary | ICD-10-CM

## 2018-10-15 DIAGNOSIS — I10 ESSENTIAL HYPERTENSION: ICD-10-CM

## 2018-10-15 DIAGNOSIS — E11.9 TYPE 2 DIABETES MELLITUS WITHOUT COMPLICATION, WITHOUT LONG-TERM CURRENT USE OF INSULIN (HCC): ICD-10-CM

## 2018-10-15 PROCEDURE — 99214 OFFICE O/P EST MOD 30 MIN: CPT | Performed by: INTERNAL MEDICINE

## 2018-10-15 RX ORDER — DOXAZOSIN MESYLATE 4 MG/1
4 TABLET ORAL NIGHTLY
Qty: 30 TABLET | Refills: 0
Start: 2018-10-15 | End: 2018-10-21

## 2018-10-15 NOTE — PROGRESS NOTES
"Chief Complaint   Patient presents with   • Adrenal nodule     f/u        HPI:   Sandra Colon is a 48 y.o.male who returns to Endocrine Clinic for f/u evaluation of his left adrenal nodule. Last visit 08/13/2018.  His history is as follows:    Interim Events:   - pt scheduled to undergo adrenal surgery on 10/22/2018  - I could not start phenoxybenzamine as planned due to significant cost for patient despite insurance coverage  - Started doxazosin 2 mg qhs, continued his losartan 100 mg daily, held the HCTZ 12.5 mg daily on 10/08/2018.    1) left adrenal nodule:  - Patient was initially evaluated by, gastroenterologist,  for diarrhea and elevated hepatic transaminases.  [place]  and had a needle stick while in a  pipe in March 2017.  He received HIV prophylaxis medication but stopped the prescription after a few weeks due to intolerance. A month after stopping the medication he developed diarrhea that was persistent for several weeks.    - As part of his evaluation he had a colonoscopy and an ultrasound of the abdomen completed at [facility] radiology on 8/29/17.  The ultrasound report had described a left kidney nodule that was later confirmed to be an actual left adrenal nodule on CT scan of the abdomen:   \"The right adrenal gland is normal. There is a low dense mass in the left adrenal gland measuring 3.0 x 4.3 cm. On the unenhanced image the adrenal mass has an attenuation coefficient of 28.5 Hounsfield units. The arterial phase demonstrates an attenuation coefficient of 33.2 Hounsfield units. The attenuation on venous imaging is 29.8 Hounsfield units and on delayed imaging 35.18 Hounsfield units. The mass demonstrates a thin rim of contrast enhancement primarily posteriorly.\"     The patient's medical history significant for hypertension diagnosed in his late 30s or early 40s.  He reports taking his HTN medication intermittently. He previously would stop his " "BP meds when BP readings were normal. He denied episodes of headaches, anxiety, or diaphoresis in the past. Was seen in ED in 02/2017 for headache, chest pain, elevated BP. Had stress test and echo which were WNL.     Initial Endocrine Visit: (10/2017)  Lab evaluation at that time showed: normal plasma metanephrines  Normetanephrine 0 - 145 pg/mL 101    Metanephrine 0 - 62 pg/mL <10    - 8AM renin level of 0.186, and aldosterone of 13.1, normal renal function and K+   Pt could not complete a 1 mg overnight dexamethasone suppression test or repeat aldosterone/renin as planned    - 05/30/2018: presented to the  ED on  with cough and abdominal pain. CT scan of the Abdomen showed again the left adrenal nodule that had increased in size since 2017 and a new finding of a left renal mass that was not present on prior imaging. Impression: \" 5.1 x 3.3 cm septated hypoattenuating left adrenal mass, enlarged from comparison study. Findings support primary adrenal neoplasm such as pheochromocytoma. Recommend further evaluation\" and  \"3.4 cm hypoattenuating lesion within the upper pole of the left kidney,  best visualized on the delayed images (series 6, image 38), new from comparison study. Renal carcinoma possible. Recommend further evaluation.\"    - 06/2018:  pt then presented to the hospital again with episode of gross hematuria. He was admitted for further evaluation.   He had a CT guided FNA of the left adrenal nodule (06/12/2018) - pathology report: \"blood and fragments of adrenal gland, no tumor seen\" In addition he was noted to have uncontrolled HTN and a new diagnosis of uncontrolled diabetes.     - 07/20/2018: Dr. Dariusz Medrano, Urology, had pt completed the following urine studies: [facility]  24 hr Urine Free Cortisol:  18.7 (4.0 - 50.0) - normal  24 hr Urine Creatinine: 1.98 (0.63 - 2.50) - normal  24 hr Urine Fractionated Metanephrines:  * Metanephrine: 114 (58 - 203) - normal  * Normetanephrine: 987 (88 - " 649) - abnormal  * Total metanephrines 1101 (182 - 739)  24 hr Urine VMA: 3.7 ( < / = 6.0)    - 08/13/2018: Endocrine Follow-up visit:  Fractionated plasma metanephrines and other biochemical testing collected after visit: Confirmed that patient had not taken any medications to cause a false positive results (verfied no Tylenol).  Discussed case with Dr. Medrano, Urology. The results showed no evidence of excess cortisol or aldosterone production. While the mildly elevated plasma normetanephrines are not diagnostic for classic pheochromocytoma (ie levels usually significantly higher), the moderate elevation is still of concern. Because the patient will be undergoing adrenalectomy based on the size of the adenoma, started pt on alpha blockade 2 weeks prior to surgery.        08/14/2018 (8:04 AM):   Normetanephrine 373 (0 - 145), Metanephrine 22 (0 - 62)  Aldosterone 9.8  Renin 0.262 (0.167 - 5.380)  AM Cortisol (1 mg dex suppression test): 0.70, Appropriately suppressed to <1.8 after 1 mg overnight dexamethasone suppression test     08/20/2018 (8:40 AM): normetanephrine 469 (0 - 145), metanephrine 29 (0 - 62)      2) essential HTN:   - pt scheduled to undergo adrenal surgery on 10/22/2018  - I could not start phenoxybenzamine as planned due to significant cost for patient despite insurance coverage  - Started doxazosin 2 mg qhs, continued his losartan 100 mg daily, held the HCTZ 12.5 mg daily on 10/08/2018.    - tolerating these medications    3) Type 2 diabetes with no known associated complications at this time:  - diagnosed in June 2018 - A1C% 11.90  - prescribed 3 oral agents by his new PCP    Current DM Medications: taking at nighttime (8-9 pm)  - metformin  mg daily  - glipizide XL 10 mg daily  - Januvia 100 mg daily    Glucometer Review: no meter for review    DM Health Maintenance:  Ophtho: Last visit - due  Podiatry: Last visit - N/A  Monofilament / Foot exam: due  Lipids: (09/2018) Tchol 144, , HDL  32,  - on crestor 20 mg Rx'd by PCP  Urine Microalb/Cr ratio: due  TSH: (06/2018) - normal    FMHx: He has a family history of diabetes but reports no known family history of hypertension    Review of Systems   Constitutional: Positive for fatigue.   Eyes: Negative.  Negative for itching.   Respiratory: Negative.    Cardiovascular: Positive for leg swelling (feet swell intermittently).   Gastrointestinal: Positive for abdominal pain.   Endocrine: Negative for polyuria.   Genitourinary: Positive for frequency.   Musculoskeletal: Negative.    Skin: Negative.    Allergic/Immunologic: Negative.    Neurological: Positive for headaches.   Hematological: Negative.    Psychiatric/Behavioral: Negative.      The following portions of the patient's history were reviewed and updated as appropriate: allergies, current medications, past family history, past medical history, past social history, past surgical history and problem list.    Vitals:    10/15/18 0809   BP: 126/90             110/92          128/100   Pulse: 70                       54                  59   SpO2: 99%  Standing          Sitting              Lying     Physical Exam   Constitutional: He is oriented to person, place, and time. He appears well-developed. No distress.   obese   HENT:   Head: Normocephalic.   Mouth/Throat: Oropharynx is clear and moist.   Eyes: Pupils are equal, round, and reactive to light. Conjunctivae and EOM are normal.   Neck: Neck supple. No tracheal deviation present. No thyromegaly present.   No palpable thyroid nodules     Cardiovascular: Normal rate, regular rhythm and normal heart sounds.    No murmur heard.  Pulmonary/Chest: Effort normal and breath sounds normal. No respiratory distress.   Abdominal: Soft. Bowel sounds are normal. He exhibits no mass. There is no tenderness.   Lymphadenopathy:     He has no cervical adenopathy.   Neurological: He is alert and oriented to person, place, and time. No cranial nerve deficit.    Skin: Skin is warm and dry. He is not diaphoretic. No erythema.   Acanthosis nigricans   Psychiatric: He has a normal mood and affect. His behavior is normal.   Vitals reviewed.    LABS/IMAGING: outside records reviewed and summarized in HPI  No visits with results within 1 Day(s) from this visit.   Latest known visit with results is:   Lab on 09/20/2018   Component Date Value Ref Range Status   • Total Cholesterol 09/20/2018 144  0 - 200 mg/dL Final   • Triglycerides 09/20/2018 180* 0 - 150 mg/dL Final   • HDL Cholesterol 09/20/2018 32* 40 - 60 mg/dL Final   • LDL Cholesterol  09/20/2018 106  0 - 130 mg/dL Final   • Glucose 09/20/2018 147* 70 - 100 mg/dL Final   • BUN 09/20/2018 12  9 - 23 mg/dL Final   • Creatinine 09/20/2018 0.84  0.60 - 1.30 mg/dL Final   • Sodium 09/20/2018 141  132 - 146 mmol/L Final   • Potassium 09/20/2018 3.7  3.5 - 5.5 mmol/L Final   • Chloride 09/20/2018 107  99 - 109 mmol/L Final   • CO2 09/20/2018 28.0  20.0 - 31.0 mmol/L Final   • Calcium 09/20/2018 9.1  8.7 - 10.4 mg/dL Final   • Total Protein 09/20/2018 6.6  5.7 - 8.2 g/dL Final   • Albumin 09/20/2018 4.04  3.20 - 4.80 g/dL Final   • ALT (SGPT) 09/20/2018 193* 7 - 40 U/L Final   • AST (SGOT) 09/20/2018 93* 0 - 33 U/L Final   • Alkaline Phosphatase 09/20/2018 113* 25 - 100 U/L Final   • Total Bilirubin 09/20/2018 0.4  0.3 - 1.2 mg/dL Final   • eGFR Non  Amer 09/20/2018 97  >60 mL/min/1.73 Final   • Globulin 09/20/2018 2.6  gm/dL Final   • A/G Ratio 09/20/2018 1.6  1.5 - 2.5 g/dL Final   • BUN/Creatinine Ratio 09/20/2018 14.3  7.0 - 25.0 Final   • Anion Gap 09/20/2018 6.0  3.0 - 11.0 mmol/L Final   • 25 Hydroxy, Vitamin D 09/20/2018 19.9  ng/ml Final       ASSESSMENT/PLAN:  1) left adrenal nodule:  - presented to the  ED on 05/30/2018 with cough and abdominal pain. CT scan of the Abdomen showed again the left adrenal nodule that had increased in size since 2017 and a new finding of a left renal mass that was not present on  "prior imaging.  - The adrenal nodule was 3.0 X 4.3 cm in the 2017 CT with contrast did not show washout of > 50%, but margin was regular.  Imaging now showing \"5.1 x 3.3 cm septated hypoattenuating left adrenal mass, enlarged from comparison study\"  and a  \"3.4 cm hypoattenuating lesion within the upper pole of the left kidney\"     Repeat biochemical evaluation of the adrenal nodule in 08/2018 showed no evidence of excess cortisol or aldosterone production. While the mildly elevated plasma normetanephrines are not diagnostic for classic pheochromocytoma (ie levels usually significantly higher), the moderate elevation is still of concern. Because the patient will be undergoing adrenalectomy based on the size of the adenoma, started pt on alpha blockade 2 weeks prior to surgery.       Will increase doxazosin to 4 mg qhs. Continue Losartan 100 mg daily. Pt to call me with BP readings in 1 weeks. Reviewed with patient potential side effects of the doxazosin    2) Type 2 diabetes with no known associated complications at this time: uncontrolled but improving, A1C% now 8.0 down from 11.90  - will contnue metformin  mg daily  - will continue glipizide XL 10 mg daily  - will continue Januvia 100 mg daily     Pt instructed to take the following medications the night before surgery:  - Metformin , (HOLD Glipizide)  - Januvia 100 mg  - Doxazosin 4 mg  - losartan 100 mg     Addendum: Pt called with BP log readings. BP close to goal . Continued doxazosin 4 mg qhs, Added 2 mg in the AM. Pt reminded he will stop this medication after surgery.       F/U as scheduled 11/07/2018    ADDENDUM: 10/20/2018, 10/21/2018    Pt called with BP log daily. Changed his BP regimen to the following to reach goal:  Doxazosin 8 mg qAM  Losartan 100 mg daily  Added back HCTZ 12.5 mg daily    On this regimen, pt with sitting BP < 120/80, standing BP <100/70                   "

## 2018-10-21 RX ORDER — HYDROCHLOROTHIAZIDE 12.5 MG/1
12.5 TABLET ORAL DAILY
Qty: 30 TABLET | Refills: 5
Start: 2018-10-21 | End: 2018-11-07

## 2018-10-21 RX ORDER — DOXAZOSIN MESYLATE 4 MG/1
8 TABLET ORAL EVERY MORNING
Qty: 30 TABLET | Refills: 0
Start: 2018-10-21 | End: 2018-11-07

## 2018-11-07 ENCOUNTER — OFFICE VISIT (OUTPATIENT)
Dept: ENDOCRINOLOGY | Facility: CLINIC | Age: 48
End: 2018-11-07

## 2018-11-07 VITALS
OXYGEN SATURATION: 98 % | HEIGHT: 68 IN | BODY MASS INDEX: 35.31 KG/M2 | WEIGHT: 233 LBS | DIASTOLIC BLOOD PRESSURE: 90 MMHG | HEART RATE: 73 BPM | SYSTOLIC BLOOD PRESSURE: 128 MMHG

## 2018-11-07 DIAGNOSIS — D35.02 PHEOCHROMOCYTOMA OF LEFT ADRENAL GLAND: Primary | ICD-10-CM

## 2018-11-07 DIAGNOSIS — E11.9 TYPE 2 DIABETES MELLITUS WITHOUT COMPLICATION, WITHOUT LONG-TERM CURRENT USE OF INSULIN (HCC): ICD-10-CM

## 2018-11-07 DIAGNOSIS — E78.2 MIXED HYPERLIPIDEMIA: ICD-10-CM

## 2018-11-07 PROBLEM — Z86.018 H/O PHEOCHROMOCYTOMA: Status: ACTIVE | Noted: 2017-10-15

## 2018-11-07 PROBLEM — N28.89 RENAL MASS: Status: RESOLVED | Noted: 2018-06-11 | Resolved: 2018-11-07

## 2018-11-07 PROBLEM — R31.0 GROSS HEMATURIA: Status: RESOLVED | Noted: 2018-06-11 | Resolved: 2018-11-07

## 2018-11-07 LAB
GLUCOSE BLDC GLUCOMTR-MCNC: 271 MG/DL (ref 70–130)
HBA1C MFR BLD: 6.3 %

## 2018-11-07 PROCEDURE — 99214 OFFICE O/P EST MOD 30 MIN: CPT | Performed by: INTERNAL MEDICINE

## 2018-11-07 PROCEDURE — 82947 ASSAY GLUCOSE BLOOD QUANT: CPT | Performed by: INTERNAL MEDICINE

## 2018-11-07 PROCEDURE — 83036 HEMOGLOBIN GLYCOSYLATED A1C: CPT | Performed by: INTERNAL MEDICINE

## 2018-11-07 RX ORDER — HYDROCODONE BITARTRATE AND ACETAMINOPHEN 7.5; 325 MG/1; MG/1
TABLET ORAL
COMMUNITY
Start: 2018-10-26 | End: 2018-12-13

## 2018-11-07 NOTE — PROGRESS NOTES
"Chief Complaint   Patient presents with   • Adrenal nodule     f/u    • Diabetes     DM2 f/u        HPI:   Sandra Colon is a 48 y.o.male who returns to Endocrine Clinic for f/u evaluation of his left adrenal nodule. Last visit 10/15/2018.  His history is as follows:    Interim Events:   - pt underwent left adrenalectomy on 10/22/2018 by Dr. Dairusz Medrano. 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 As part of his evaluation he had a colonoscopy and an ultrasound of the abdomen completed at [facility] radiology on 8/29/17.  The ultrasound report had described a left kidney nodule that was later confirmed to be an actual left adrenal nodule on CT scan of the abdomen:   \"The right adrenal gland is normal. There is a low dense mass in the left adrenal gland measuring 3.0 x 4.3 cm. On the unenhanced image the adrenal mass has an attenuation coefficient of 28.5 Hounsfield units. The arterial phase demonstrates an attenuation coefficient of 33.2 Hounsfield units. The attenuation on venous imaging is 29.8 Hounsfield units and on delayed imaging 35.18 Hounsfield units. The mass demonstrates a thin rim of contrast enhancement primarily posteriorly.\"     - The patient's medical history significant for HTN diagnosed in his late 30s or early 40s.  He reports taking his HTN medication intermittently. He initially denied episodes of headaches, anxiety, or diaphoresis in the past. Was seen in ED in 02/2017 for headache, chest pain, elevated BP. Had stress test and echo which were WNL.     Initial Endocrine Visit: (10/2017)  Lab evaluation at that time showed: normal plasma metanephrines  Normetanephrine 0 - 145 pg/mL 101    Metanephrine 0 - 62 pg/mL <10    - 8AM renin level " "of 0.186, and aldosterone of 13.1, normal renal function and K+   Pt could not complete a 1 mg overnight dexamethasone suppression test or repeat aldosterone/renin as planned    - 05/30/2018: presented to the  ED on  with cough and abdominal pain. CT scan of the Abdomen showed again the left adrenal nodule that had increased in size since 2017 and a new finding of a left renal mass that was not present on prior imaging. Impression: \" 5.1 x 3.3 cm septated hypoattenuating left adrenal mass, enlarged from comparison study. Findings support primary adrenal neoplasm such as pheochromocytoma. Recommend further evaluation\" and  \"3.4 cm hypoattenuating lesion within the upper pole of the left kidney,  best visualized on the delayed images (series 6, image 38), new from comparison study. Renal carcinoma possible. Recommend further evaluation.\"    - 06/2018:  pt then presented to the hospital again with episode of gross hematuria. He was admitted for further evaluation.   He had a CT guided FNA of the left adrenal nodule (06/12/2018) - pathology report: \"blood and fragments of adrenal gland, no tumor seen\" In addition he was noted to have uncontrolled HTN and a new diagnosis of uncontrolled diabetes.     - 07/20/2018: Dr. Dariusz Medrano, Urology, had pt completed the following urine studies: [facility]  24 hr Urine Free Cortisol:  18.7 (4.0 - 50.0) - normal  24 hr Urine Creatinine: 1.98 (0.63 - 2.50) - normal  24 hr Urine Fractionated Metanephrines:  * Metanephrine: 114 (58 - 203) - normal  * Normetanephrine: 987 (88 - 649) - abnormal  * Total metanephrines 1101 (182 - 739)  24 hr Urine VMA: 3.7 ( < / = 6.0)    - 08/13/2018: Endocrine Follow-up visit:  Fractionated plasma metanephrines and other biochemical testing collected after visit: Confirmed that patient had not taken any medications to cause a false positive results (verfied no Tylenol).  Discussed case with Dr. Medrano, Urology. The results showed no evidence of " "excess cortisol or aldosterone production. While the mildly elevated plasma normetanephrines were not diagnostic for classic pheochromocytoma (ie levels usually significantly higher), the moderate elevation was still of concern. Started pt on alpha blockade 2 weeks prior to surgery.      - 10/22/2018:  pt underwent left adrenalectomy on 10/22/2018 by Dr. Dariusz Medrano. Intraoperative use of US with renal exploration was completed. No renal lesions were seen.     Surgical pathology: pheochromocytoma and nodular adrenocortical hyperplasia.   \"Microscopic Description: Sections reveal a neoplasm within the medullary area area of the adrenal gland composed of polygonal shaped cells with moderate to abundant granular faintly blue/purple cytoplasm. Intranuclear inclusions are noted. Other areas show more diffuse and solid arrangements of tumor cells. Nuclear hyperchromasia and pleomorphism is noted prominently. Scattered mitoses with up to 3 Immunohistochemical stains are performed and reported as follows: Chromogranin A +, GATA3 +, Calretinin (-) in cells of interest, Inhibin (-) in cells of interest, Melan-A  (-) in cells of interest. The overall findings ae those of pheochromocytoma. Additionally, there is a nodule composed of adrenocortical cells, c/w nodular adrenocortical hyperplasia.\"     2) h/o HTN:   - pt has been off all antihypertensive medications post-operatively  - BP at home has been normal    3) Type 2 diabetes with no known associated complications at this time:  - diagnosed in June 2018 - A1C% 11.90. Prescribed 3 oral agents by his new PCP  - pt stopped his medications post-op    Current DM Medications: supposed to be taking  - metformin  mg daily  - glipizide XL 10 mg daily  - Januvia 100 mg daily    Glucometer Review: no meter for review    DM Health Maintenance:  Ophtho: Last visit - due  Podiatry: Last visit - N/A  Monofilament / Foot exam: due  Lipids: (09/2018) Tchol 144, , HDL 32,  " - on crestor 20 mg Rx'd by PCP  Urine Microalb/Cr ratio: due  TSH: (06/2018) - normal    FMHx: He has a family history of diabetes but reports no known family history of hypertension    Review of Systems   Constitutional: Negative.  Negative for fatigue.   Eyes: Negative.  Negative for itching.   Respiratory: Negative.    Cardiovascular: Positive for leg swelling (feet swell intermittently).   Gastrointestinal: Positive for abdominal pain (mild at incision site).   Endocrine: Negative for polyuria.   Genitourinary: Negative.  Negative for frequency.   Musculoskeletal: Positive for back pain (right sided low back).   Skin: Negative.    Allergic/Immunologic: Negative.    Neurological: Negative for headaches.   Hematological: Negative.    Psychiatric/Behavioral: Negative.      The following portions of the patient's history were reviewed and updated as appropriate: allergies, current medications, past family history, past medical history, past social history, past surgical history and problem list.    Vitals:    11/07/18 0952   BP: 128/90   Pulse: 73   SpO2: 98%       Physical Exam   Constitutional: He is oriented to person, place, and time. He appears well-developed. No distress.   obese   HENT:   Head: Normocephalic.   Mouth/Throat: Oropharynx is clear and moist.   Eyes: Conjunctivae and EOM are normal. Pupils are equal, round, and reactive to light.   Neck: Neck supple. No tracheal deviation present. No thyromegaly present.   No palpable thyroid nodules     Cardiovascular: Normal rate, regular rhythm and normal heart sounds.   No murmur heard.  Pulmonary/Chest: Effort normal and breath sounds normal. No respiratory distress.   Abdominal: Soft. Bowel sounds are normal. He exhibits no mass. There is tenderness (at surgical site).   Incision C/D/I, staples present   Musculoskeletal: He exhibits edema (mild lower extremity).   Lymphadenopathy:     He has no cervical adenopathy.   Neurological: He is alert and oriented  to person, place, and time. No cranial nerve deficit.   Skin: Skin is warm and dry. He is not diaphoretic. No erythema.   Acanthosis nigricans   Psychiatric: He has a normal mood and affect. His behavior is normal.   Vitals reviewed.    LABS/IMAGING: outside records reviewed and summarized in HPI  Office Visit on 11/07/2018   Component Date Value Ref Range Status   • Glucose 11/07/2018 271* 70 - 130 mg/dL Final   • Hemoglobin A1C 11/07/2018 6.3  % Final       ASSESSMENT/PLAN:  1) left pheochromocytoma: s/p resection 10/22/2018  - clinically doing well after surgery. HAs have resolved. BP normal at this time  - d/w with patient that he will need lifelong follow-up as pheochromocytomas can recur  - will plan to repeat his plasma metanephrines in 1 yr  - d/w pt that although his BP has been normal, he may have underlying essential HTN and need medication in the future    2) Type 2 diabetes with no known associated complications at this time: controlled, A1C% now 6.3 down from 11.90  - pt had stopped his medication post-operatively  - advised to restart metformin  mg daily  - advised to restart Januvia 100 mg daily   - d/c glipizide    3) mixed hyperlipidemia  - continue crestor 20mg daily. If not tolerated, may change to different statin.     RTC 4 months    Counseling was given to patient for the following topics:  instructions for management, prognosis and impressions, see details in assessment/plan. Total face to face time of the encounter was 30 minutes and 25 minutes was spent counseling.

## 2018-11-13 ENCOUNTER — TRANSCRIBE ORDERS (OUTPATIENT)
Dept: ADMINISTRATIVE | Facility: HOSPITAL | Age: 48
End: 2018-11-13

## 2018-11-13 DIAGNOSIS — R60.9 SWELLING: Primary | ICD-10-CM

## 2018-11-14 ENCOUNTER — HOSPITAL ENCOUNTER (OUTPATIENT)
Dept: CARDIOLOGY | Facility: HOSPITAL | Age: 48
Discharge: HOME | End: 2018-11-14
Attending: UROLOGY | Admitting: UROLOGY

## 2018-11-14 VITALS — WEIGHT: 233 LBS | HEIGHT: 68 IN | BODY MASS INDEX: 35.31 KG/M2

## 2018-11-14 DIAGNOSIS — R60.9 SWELLING: ICD-10-CM

## 2018-11-14 LAB
BH CV LOWER VASCULAR LEFT COMMON FEMORAL AUGMENT: NORMAL
BH CV LOWER VASCULAR LEFT COMMON FEMORAL COMPRESS: NORMAL
BH CV LOWER VASCULAR LEFT COMMON FEMORAL PHASIC: NORMAL
BH CV LOWER VASCULAR LEFT COMMON FEMORAL SPONT: NORMAL
BH CV LOWER VASCULAR LEFT DISTAL FEMORAL COMPRESS: NORMAL
BH CV LOWER VASCULAR LEFT GASTRONEMIUS COMPRESS: NORMAL
BH CV LOWER VASCULAR LEFT GREATER SAPH AK COMPRESS: NORMAL
BH CV LOWER VASCULAR LEFT GREATER SAPH BK COMPRESS: NORMAL
BH CV LOWER VASCULAR LEFT MID FEMORAL AUGMENT: NORMAL
BH CV LOWER VASCULAR LEFT MID FEMORAL COMPRESS: NORMAL
BH CV LOWER VASCULAR LEFT MID FEMORAL PHASIC: NORMAL
BH CV LOWER VASCULAR LEFT MID FEMORAL SPONT: NORMAL
BH CV LOWER VASCULAR LEFT PERONEAL COMPRESS: NORMAL
BH CV LOWER VASCULAR LEFT POPLITEAL AUGMENT: NORMAL
BH CV LOWER VASCULAR LEFT POPLITEAL COMPRESS: NORMAL
BH CV LOWER VASCULAR LEFT POPLITEAL PHASIC: NORMAL
BH CV LOWER VASCULAR LEFT POPLITEAL SPONT: NORMAL
BH CV LOWER VASCULAR LEFT POSTERIOR TIBIAL COMPRESS: NORMAL
BH CV LOWER VASCULAR LEFT PROXIMAL FEMORAL COMPRESS: NORMAL
BH CV LOWER VASCULAR LEFT SAPHENOFEMORAL JUNCTION AUGMENT: NORMAL
BH CV LOWER VASCULAR LEFT SAPHENOFEMORAL JUNCTION COMPRESS: NORMAL
BH CV LOWER VASCULAR LEFT SAPHENOFEMORAL JUNCTION PHASIC: NORMAL
BH CV LOWER VASCULAR LEFT SAPHENOFEMORAL JUNCTION SPONT: NORMAL
BH CV LOWER VASCULAR RIGHT COMMON FEMORAL AUGMENT: NORMAL
BH CV LOWER VASCULAR RIGHT COMMON FEMORAL COMPRESS: NORMAL
BH CV LOWER VASCULAR RIGHT COMMON FEMORAL PHASIC: NORMAL
BH CV LOWER VASCULAR RIGHT COMMON FEMORAL SPONT: NORMAL
BH CV LOWER VASCULAR RIGHT DISTAL FEMORAL COMPRESS: NORMAL
BH CV LOWER VASCULAR RIGHT GASTRONEMIUS COMPRESS: NORMAL
BH CV LOWER VASCULAR RIGHT GREATER SAPH AK COMPRESS: NORMAL
BH CV LOWER VASCULAR RIGHT GREATER SAPH BK COMPRESS: NORMAL
BH CV LOWER VASCULAR RIGHT MID FEMORAL AUGMENT: NORMAL
BH CV LOWER VASCULAR RIGHT MID FEMORAL COMPRESS: NORMAL
BH CV LOWER VASCULAR RIGHT MID FEMORAL PHASIC: NORMAL
BH CV LOWER VASCULAR RIGHT MID FEMORAL SPONT: NORMAL
BH CV LOWER VASCULAR RIGHT PERONEAL COMPRESS: NORMAL
BH CV LOWER VASCULAR RIGHT POPLITEAL AUGMENT: NORMAL
BH CV LOWER VASCULAR RIGHT POPLITEAL COMPRESS: NORMAL
BH CV LOWER VASCULAR RIGHT POPLITEAL PHASIC: NORMAL
BH CV LOWER VASCULAR RIGHT POPLITEAL SPONT: NORMAL
BH CV LOWER VASCULAR RIGHT POSTERIOR TIBIAL COMPRESS: NORMAL
BH CV LOWER VASCULAR RIGHT PROXIMAL FEMORAL COMPRESS: NORMAL
BH CV LOWER VASCULAR RIGHT SAPHENOFEMORAL JUNCTION AUGMENT: NORMAL
BH CV LOWER VASCULAR RIGHT SAPHENOFEMORAL JUNCTION COMPRESS: NORMAL
BH CV LOWER VASCULAR RIGHT SAPHENOFEMORAL JUNCTION PHASIC: NORMAL
BH CV LOWER VASCULAR RIGHT SAPHENOFEMORAL JUNCTION SPONT: NORMAL

## 2018-11-14 PROCEDURE — 93970 EXTREMITY STUDY: CPT | Performed by: INTERNAL MEDICINE

## 2018-11-14 PROCEDURE — 93970 EXTREMITY STUDY: CPT

## 2018-12-11 ENCOUNTER — TELEPHONE (OUTPATIENT)
Dept: FAMILY MEDICINE CLINIC | Facility: CLINIC | Age: 48
End: 2018-12-11

## 2018-12-11 NOTE — TELEPHONE ENCOUNTER
Pt called needing a refill on Metformin Er 750 Mg, Hydocodone-acetaminophen 7.5-325 Mg, Rosuvastatin 20 Mg  and Losartan

## 2018-12-11 NOTE — TELEPHONE ENCOUNTER
Called and informed pt of him having refills for Metformin and Crestor at his pharmacy. Informed pt we did not have Losartan on his med list and I did not show that Dr. Marquez filled the Hydrocodone. Pt stated after his surgery all of his medications were messed up and he is not sure what he should be taking and thinks he should be on something for his BP but not sure. I informed pt of needing an appt. He verbalized understanding and was scheduled to see Dr. Marquez on 12/13 at 3pm. He had no further questions.

## 2018-12-13 ENCOUNTER — OFFICE VISIT (OUTPATIENT)
Dept: FAMILY MEDICINE CLINIC | Facility: CLINIC | Age: 48
End: 2018-12-13

## 2018-12-13 VITALS
WEIGHT: 240.2 LBS | DIASTOLIC BLOOD PRESSURE: 84 MMHG | SYSTOLIC BLOOD PRESSURE: 122 MMHG | OXYGEN SATURATION: 97 % | HEIGHT: 68 IN | TEMPERATURE: 98.7 F | BODY MASS INDEX: 36.4 KG/M2 | HEART RATE: 78 BPM

## 2018-12-13 DIAGNOSIS — E11.9 TYPE 2 DIABETES MELLITUS WITHOUT COMPLICATION, WITHOUT LONG-TERM CURRENT USE OF INSULIN (HCC): Primary | ICD-10-CM

## 2018-12-13 DIAGNOSIS — I10 ESSENTIAL HYPERTENSION: ICD-10-CM

## 2018-12-13 PROCEDURE — 99214 OFFICE O/P EST MOD 30 MIN: CPT | Performed by: FAMILY MEDICINE

## 2018-12-13 RX ORDER — LISINOPRIL 5 MG/1
5 TABLET ORAL DAILY
Qty: 30 TABLET | Refills: 2 | Status: SHIPPED | OUTPATIENT
Start: 2018-12-13 | End: 2019-03-13

## 2019-01-07 NOTE — PROGRESS NOTES
Subjective   Sandra Colon is a 48 y.o. male.     Chief Complaint   Patient presents with   • Medication Problem     BP medications        History of Present Illness     Sandra Colon presents today for follow up on HTN and diabetes. He reports issue with his BP meds. His blood pressure has been steadily rising since his discharge. He is not currently on any meds. He is continued on januvia and metformin for diabetes.    The following portions of the patient's history were reviewed and updated as appropriate: allergies, current medications, past family history, past medical history, past social history, past surgical history and problem list.    Active Ambulatory Problems     Diagnosis Date Noted   • Fatty liver    • Hyperlipidemia    • H/O pheochromocytoma 10/15/2017   • Type 2 diabetes mellitus without complication (CMS/HCC) 06/11/2018     Resolved Ambulatory Problems     Diagnosis Date Noted   • Renal mass 06/11/2018   • Gross hematuria 06/11/2018     Past Medical History:   Diagnosis Date   • Adrenal tumor    • Diabetes mellitus (CMS/HCC)    • Fatty liver    • Hyperlipidemia    • Hypertension    • Kidney tumor      Past Surgical History:   Procedure Laterality Date   • CYST REMOVAL Left     on wrist   • KNEE SURGERY Left      Family History   Problem Relation Age of Onset   • No Known Problems Mother    • Diabetes Father    • No Known Problems Sister    • No Known Problems Brother    • Cancer Paternal Grandmother    • Diabetes Paternal Grandfather      Social History     Socioeconomic History   • Marital status:      Spouse name: Not on file   • Number of children: Not on file   • Years of education: Not on file   • Highest education level: Not on file   Social Needs   • Financial resource strain: Not on file   • Food insecurity - worry: Not on file   • Food insecurity - inability: Not on file   • Transportation needs - medical: Not on file   • Transportation needs - non-medical: Not on file  "  Occupational History   • Not on file   Tobacco Use   • Smoking status: Never Smoker   • Smokeless tobacco: Never Used   Substance and Sexual Activity   • Alcohol use: Yes     Alcohol/week: 1.2 oz     Types: 2 Standard drinks or equivalent per week     Comment: only on weekends, none in 2 weeks   • Drug use: No   • Sexual activity: Defer   Other Topics Concern   • Not on file   Social History Narrative    Lives in [place] with wife and 2 kids         Review of Systems   Constitutional: Negative.    Respiratory: Negative.    Cardiovascular: Negative.        Objective   Blood pressure 122/84, pulse 78, temperature 98.7 °F (37.1 °C), temperature source Temporal, height 172.7 cm (68\"), weight 109 kg (240 lb 3.2 oz), SpO2 97 %.  Nursing note reviewed  Physical Exam  Const: NAD, A&Ox4, Pleasant, Cooperative. English is adequate, understanding adequate  Eyes: EOMI, no conjunctivitis  ENT: No nasal discharge present, neck supple  Cardiac: Regular rate and rhythm, no cyanosis  Resp: Respiratory rate within normal limits, no increased work of breathing, no audible wheezing or retractions noted  GI: No distention or ascites  MSK: Motor and sensation grossly intact in bilateral upper extremities  Neurologic: CN II-XII grossly intact  Psych: Appropriate mood and behavior.  Skin: Pink, warm, dry  Procedures  Assessment/Plan   Admildo was seen today for medication problem.    Diagnoses and all orders for this visit:    Type 2 diabetes mellitus without complication, without long-term current use of insulin (CMS/[facility])  -     lisinopril (PRINIVIL,ZESTRIL) 5 MG tablet; Take 1 tablet by mouth Daily.    Essential hypertension  -     lisinopril (PRINIVIL,ZESTRIL) 5 MG tablet; Take 1 tablet by mouth Daily.        Patient Instructions   1.  Start lisinopril for blood pressure.    2.  Walk for 30 minutes at a brisk pace at least every other day. You may also use the bike or elliptical. No weight lifting.    3.  Aim for weight loss of 1 " pound per week.      No Follow-up on file.    Ambulatory progress note signed and attested to by Miguel Angel Marquez D.O.

## 2019-03-13 ENCOUNTER — OFFICE VISIT (OUTPATIENT)
Dept: ENDOCRINOLOGY | Facility: CLINIC | Age: 49
End: 2019-03-13

## 2019-03-13 VITALS
DIASTOLIC BLOOD PRESSURE: 90 MMHG | OXYGEN SATURATION: 98 % | SYSTOLIC BLOOD PRESSURE: 110 MMHG | WEIGHT: 241.8 LBS | BODY MASS INDEX: 36.77 KG/M2 | HEART RATE: 76 BPM

## 2019-03-13 DIAGNOSIS — Z86.018 H/O PHEOCHROMOCYTOMA: ICD-10-CM

## 2019-03-13 DIAGNOSIS — R10.9 ABDOMINAL PAIN, UNSPECIFIED ABDOMINAL LOCATION: ICD-10-CM

## 2019-03-13 DIAGNOSIS — E78.2 MIXED HYPERLIPIDEMIA: ICD-10-CM

## 2019-03-13 DIAGNOSIS — E11.9 TYPE 2 DIABETES MELLITUS WITHOUT COMPLICATION, WITHOUT LONG-TERM CURRENT USE OF INSULIN (HCC): Primary | ICD-10-CM

## 2019-03-13 LAB
GLUCOSE BLDC GLUCOMTR-MCNC: 166 MG/DL (ref 70–130)
HBA1C MFR BLD: 8.6 %

## 2019-03-13 PROCEDURE — 99214 OFFICE O/P EST MOD 30 MIN: CPT | Performed by: INTERNAL MEDICINE

## 2019-03-13 PROCEDURE — 82947 ASSAY GLUCOSE BLOOD QUANT: CPT | Performed by: INTERNAL MEDICINE

## 2019-03-13 PROCEDURE — 83036 HEMOGLOBIN GLYCOSYLATED A1C: CPT | Performed by: INTERNAL MEDICINE

## 2019-03-13 RX ORDER — ROSUVASTATIN CALCIUM 20 MG/1
20 TABLET, COATED ORAL DAILY
Qty: 90 TABLET | Refills: 3 | Status: SHIPPED | OUTPATIENT
Start: 2019-03-13 | End: 2019-08-30 | Stop reason: SINTOL

## 2019-03-13 NOTE — PROGRESS NOTES
"Chief Complaint   Patient presents with   • Pheochromocytoma of left adrenal gland     f/u    • Diabetes     DM1 f/u        HPI:   Sandra Colon is a 49 y.o.male who returns to [facility]ocrine Clinic for f/u evaluation of Type 2 DM and h/o pheochromocytoma. Last visit 11/07/2018.  His history is as follows:    Interim Events:   - still having intermittent abdominal pain. Is scheduled to have a repeat CT scan and f/u with Dr. Medrano.   - pt stopped taking some of his diabetes medications    1) Type 2 diabetes with no known associated complications at this time:  - diagnosed in June 2018 - A1C% 11.90. Prescribed 3 oral agents by his new PCP  - pt stopped his medications post-op  - I restarted the medications in 11/2018, but patient has only been taking the metformin    Current DM Medications: supposed to be taking  - metformin  mg daily  - not taking the glipizide XL 10 mg daily and Januvia 100 mg daily    Glucometer Review: no meter for review    DM Health Maintenance:  Ophtho: Last visit - due  Podiatry: Last visit - N/A  Monofilament / Foot exam: due  Lipids: (09/2018) Tchol 144, , HDL 32,  - on crestor 20 mg   Urine Microalb/Cr ratio: due  TSH: (06/2018) - normal    FMHx: He has a family history of diabetes but reports no known family history of hypertension    2) h/o left adrenal nodule (pheochromocytoma and nodular adrenocortical hyperplasia:  - initially evaluated by, gastroenterologist,  for diarrhea and elevated hepatic transaminases. As part of his evaluation he had a colonoscopy and an ultrasound of the abdomen completed at [facility] radiology on 8/29/17.  The ultrasound report had described a left kidney nodule that was later confirmed to be a left adrenal nodule on CT scan of the abdomen:   \"The right adrenal gland is normal. There is a low dense mass in the left adrenal gland measuring 3.0 x 4.3 cm. On the unenhanced image the adrenal mass has an attenuation coefficient of 28.5 " "Hounsfield units. The arterial phase demonstrates an attenuation coefficient of 33.2 Hounsfield units. The attenuation on venous imaging is 29.8 Hounsfield units and on delayed imaging 35.18 Hounsfield units. The mass demonstrates a thin rim of contrast enhancement primarily posteriorly.\"     See prior notes for biochemical evaluation prior to surgery.    - 10/22/2018:  pt underwent left adrenalectomy on 10/22/2018 by Dr. Dariusz Medrano. Intraoperative use of US with renal exploration was completed. No renal lesions were seen.     Surgical pathology: pheochromocytoma and nodular adrenocortical hyperplasia.   \"Microscopic Description: Sections reveal a neoplasm within the medullary area area of the adrenal gland composed of polygonal shaped cells with moderate to abundant granular faintly blue/purple cytoplasm. Intranuclear inclusions are noted. Other areas show more diffuse and solid arrangements of tumor cells. Nuclear hyperchromasia and pleomorphism is noted prominently. Scattered mitoses with up to 3 Immunohistochemical stains are performed and reported as follows: Chromogranin A +, GATA3 +, Calretinin (-) in cells of interest, Inhibin (-) in cells of interest, Melan-A  (-) in cells of interest. The overall findings ae those of pheochromocytoma. Additionally, there is a nodule composed of adrenocortical cells, c/w nodular adrenocortical hyperplasia.\"    Since last visit, no elevation in BP, no headaches.     3) h/o HTN:   - pt has been off all antihypertensive medications post-operatively  - BP at home has been normal  - diastolic 80's - 90 in clinic    Review of Systems   Constitutional: Negative.  Negative for fatigue.   Eyes: Negative.  Negative for itching.   Respiratory: Negative.    Cardiovascular: Positive for leg swelling (feet swell intermittently).   Gastrointestinal: Positive for abdominal pain (intermittent, right and left sides).   Endocrine: Negative for polyuria.   Genitourinary: Negative.  Negative " for frequency.   Musculoskeletal: Positive for back pain (right sided low back).   Skin: Negative.    Allergic/Immunologic: Negative.    Neurological: Negative for headaches.   Hematological: Negative.    Psychiatric/Behavioral: Negative.      The following portions of the patient's history were reviewed and updated as appropriate: allergies, current medications, past family history, past medical history, past social history, past surgical history and problem list.    Vitals:    03/13/19 1653   BP: 110/90   Pulse: 76   SpO2: 98%       Physical Exam   Constitutional: He is oriented to person, place, and time. He appears well-developed. No distress.   obese   HENT:   Head: Normocephalic.   Mouth/Throat: Oropharynx is clear and moist.   Eyes: Conjunctivae and EOM are normal. Pupils are equal, round, and reactive to light.   Neck: Neck supple. No tracheal deviation present. No thyromegaly present.   No palpable thyroid nodules     Cardiovascular: Normal rate, regular rhythm and normal heart sounds.   No murmur heard.  Pulmonary/Chest: Effort normal and breath sounds normal. No respiratory distress.   Abdominal: Soft. Bowel sounds are normal. He exhibits no mass. There is no tenderness.   Surgical scar well healed   Musculoskeletal: He exhibits no edema.   Lymphadenopathy:     He has no cervical adenopathy.   Neurological: He is alert and oriented to person, place, and time. No cranial nerve deficit.   Skin: Skin is warm and dry. He is not diaphoretic. No erythema.   Acanthosis nigricans   Psychiatric: He has a normal mood and affect. His behavior is normal.   Vitals reviewed.    LABS/IMAGING: outside records reviewed and summarized in HPI  Office Visit on 03/13/2019   Component Date Value Ref Range Status   • Glucose 03/13/2019 166* 70 - 130 mg/dL Final   • Hemoglobin A1C 03/13/2019 8.6  % Final       ASSESSMENT/PLAN:  1) Type 2 diabetes with no known associated complications at this time: uncontrolled, A1C% now 8.6  today up from 6.3 last visit:  - in part due to patient stopping some of his diabetic medications  - will make the following changes to his regimen:  Start Janumet  - 1000: 1 tablet daily, samples given today    DM Health maintenance labs ordered, pt will complete when fasting     2) h/o left pheochromocytoma: s/p resection 10/22/2018  - clinically doing well after surgery. HAs have resolved. BP WNL at this time  - d/w with patient that he will need lifelong follow-up as pheochromocytomas can recur  - will plan to repeat his plasma metanephrines   - d/w pt that although his BP has been normal, he may have underlying essential HTN and need medication in the future    Still having abdominal pain: UA ordered.  Is scheduled to have a repeat CT scan and f/u with Dr. Medrano.     3) mixed hyperlipidemia  - continue crestor 20mg daily. If not tolerated, may change to different statin.   - lipid panel ordered, pt will complete when fasting    RTC 6 months    Counseling was given to patient for the following topics:  instructions for management, prognosis and impressions, see details in assessment/plan. Total face to face time of the encounter was 30 minutes and 25 minutes was spent counseling.

## 2019-03-28 ENCOUNTER — LAB (OUTPATIENT)
Dept: INTERNAL MEDICINE | Facility: CLINIC | Age: 49
End: 2019-03-28

## 2019-03-28 DIAGNOSIS — E78.2 MIXED HYPERLIPIDEMIA: ICD-10-CM

## 2019-03-28 DIAGNOSIS — R10.9 ABDOMINAL PAIN, UNSPECIFIED ABDOMINAL LOCATION: ICD-10-CM

## 2019-03-28 DIAGNOSIS — E11.9 TYPE 2 DIABETES MELLITUS WITHOUT COMPLICATION, WITHOUT LONG-TERM CURRENT USE OF INSULIN (HCC): ICD-10-CM

## 2019-03-28 DIAGNOSIS — Z86.018 H/O PHEOCHROMOCYTOMA: ICD-10-CM

## 2019-03-28 LAB
ALBUMIN SERPL-MCNC: 4.25 G/DL (ref 3.2–4.8)
ALBUMIN/GLOB SERPL: 1.6 G/DL (ref 1.5–2.5)
ALP SERPL-CCNC: 120 U/L (ref 25–100)
ALT SERPL W P-5'-P-CCNC: 224 U/L (ref 7–40)
ANION GAP SERPL CALCULATED.3IONS-SCNC: 7 MMOL/L (ref 3–11)
ARTICHOKE IGE QN: 168 MG/DL (ref 0–130)
AST SERPL-CCNC: 104 U/L (ref 0–33)
BACTERIA UR QL AUTO: NORMAL /HPF
BILIRUB SERPL-MCNC: 0.6 MG/DL (ref 0.3–1.2)
BILIRUB UR QL STRIP: NEGATIVE
BUN BLD-MCNC: 14 MG/DL (ref 9–23)
BUN/CREAT SERPL: 14.7 (ref 7–25)
CALCIUM SPEC-SCNC: 9.4 MG/DL (ref 8.7–10.4)
CHLORIDE SERPL-SCNC: 105 MMOL/L (ref 99–109)
CHOLEST SERPL-MCNC: 201 MG/DL (ref 0–200)
CLARITY UR: CLEAR
CO2 SERPL-SCNC: 26 MMOL/L (ref 20–31)
COLOR UR: YELLOW
CREAT BLD-MCNC: 0.95 MG/DL (ref 0.6–1.3)
DEPRECATED RDW RBC AUTO: 47.9 FL (ref 37–54)
ERYTHROCYTE [DISTWIDTH] IN BLOOD BY AUTOMATED COUNT: 13.9 % (ref 11.3–14.5)
GFR SERPL CREATININE-BSD FRML MDRD: 84 ML/MIN/1.73
GLOBULIN UR ELPH-MCNC: 2.7 GM/DL
GLUCOSE BLD-MCNC: 134 MG/DL (ref 70–100)
GLUCOSE UR STRIP-MCNC: NEGATIVE MG/DL
HCT VFR BLD AUTO: 47.1 % (ref 38.9–50.9)
HDLC SERPL-MCNC: 30 MG/DL (ref 40–60)
HGB BLD-MCNC: 15.6 G/DL (ref 13.1–17.5)
HGB UR QL STRIP.AUTO: NEGATIVE
HYALINE CASTS UR QL AUTO: NORMAL /LPF
KETONES UR QL STRIP: NEGATIVE
LEUKOCYTE ESTERASE UR QL STRIP.AUTO: NEGATIVE
MCH RBC QN AUTO: 31.5 PG (ref 27–31)
MCHC RBC AUTO-ENTMCNC: 33.1 G/DL (ref 32–36)
MCV RBC AUTO: 95.2 FL (ref 80–99)
NITRITE UR QL STRIP: NEGATIVE
PH UR STRIP.AUTO: <=5 [PH] (ref 5–8)
PLATELET # BLD AUTO: 219 10*3/MM3 (ref 150–450)
PMV BLD AUTO: 11.4 FL (ref 6–12)
POTASSIUM BLD-SCNC: 4.8 MMOL/L (ref 3.5–5.5)
PROT SERPL-MCNC: 6.9 G/DL (ref 5.7–8.2)
PROT UR QL STRIP: NEGATIVE
RBC # BLD AUTO: 4.95 10*6/MM3 (ref 4.2–5.76)
RBC # UR: NORMAL /HPF
REF LAB TEST METHOD: NORMAL
SODIUM BLD-SCNC: 138 MMOL/L (ref 132–146)
SP GR UR STRIP: 1.02 (ref 1–1.03)
SQUAMOUS #/AREA URNS HPF: NORMAL /HPF
TRIGL SERPL-MCNC: 133 MG/DL (ref 0–150)
UROBILINOGEN UR QL STRIP: NORMAL
WBC NRBC COR # BLD: 5.83 10*3/MM3 (ref 3.5–10.8)
WBC UR QL AUTO: NORMAL /HPF

## 2019-03-28 PROCEDURE — 80061 LIPID PANEL: CPT | Performed by: INTERNAL MEDICINE

## 2019-03-28 PROCEDURE — 81001 URINALYSIS AUTO W/SCOPE: CPT | Performed by: INTERNAL MEDICINE

## 2019-03-28 PROCEDURE — 83835 ASSAY OF METANEPHRINES: CPT | Performed by: INTERNAL MEDICINE

## 2019-03-28 PROCEDURE — 82570 ASSAY OF URINE CREATININE: CPT | Performed by: INTERNAL MEDICINE

## 2019-03-28 PROCEDURE — 82043 UR ALBUMIN QUANTITATIVE: CPT | Performed by: INTERNAL MEDICINE

## 2019-03-28 PROCEDURE — 85027 COMPLETE CBC AUTOMATED: CPT | Performed by: INTERNAL MEDICINE

## 2019-03-28 PROCEDURE — 80053 COMPREHEN METABOLIC PANEL: CPT | Performed by: INTERNAL MEDICINE

## 2019-03-30 LAB
CREAT 24H UR-MCNC: 122.3 MG/DL
MICROALBUMIN UR-MCNC: 14.4 UG/ML
MICROALBUMIN/CREAT UR: 11.8 MG/G CREAT (ref 0–30)

## 2019-04-01 ENCOUNTER — TELEPHONE (OUTPATIENT)
Dept: INTERNAL MEDICINE | Facility: CLINIC | Age: 49
End: 2019-04-01

## 2019-04-01 NOTE — TELEPHONE ENCOUNTER
----- Message from Roma Abbott MD sent at 3/27/2019 11:29 PM EDT -----  Regarding: please call pt  Dante,   Please call patient and remind him to complete labs at his convenience. Orders are in epic    Roma Abbott MD

## 2019-04-03 LAB
METANEPHRINE, PL: 15 PG/ML (ref 0–62)
NORMETANEPHRINE, PL: 60 PG/ML (ref 0–145)

## 2019-04-30 ENCOUNTER — TELEPHONE (OUTPATIENT)
Dept: ENDOCRINOLOGY | Facility: CLINIC | Age: 49
End: 2019-04-30

## 2019-08-30 DIAGNOSIS — E11.9 TYPE 2 DIABETES MELLITUS WITHOUT COMPLICATION, WITHOUT LONG-TERM CURRENT USE OF INSULIN (HCC): Primary | ICD-10-CM

## 2019-08-30 DIAGNOSIS — E78.2 MIXED HYPERLIPIDEMIA: ICD-10-CM

## 2019-08-30 RX ORDER — ATORVASTATIN CALCIUM 20 MG/1
20 TABLET, FILM COATED ORAL DAILY
Qty: 90 TABLET | Refills: 3 | Status: SHIPPED | OUTPATIENT
Start: 2019-08-30 | End: 2020-01-27

## 2019-09-13 ENCOUNTER — TELEPHONE (OUTPATIENT)
Dept: ENDOCRINOLOGY | Facility: CLINIC | Age: 49
End: 2019-09-13

## 2019-09-13 DIAGNOSIS — E11.65 UNCONTROLLED TYPE 2 DIABETES MELLITUS WITH HYPERGLYCEMIA (HCC): Primary | ICD-10-CM

## 2019-09-13 NOTE — TELEPHONE ENCOUNTER
Dante,     I sent in a new rx for Xigduo ER 1000/10 mg. Can you keep an eye out for a denial or PA.  Thanks  MD

## 2019-09-13 NOTE — TELEPHONE ENCOUNTER
I spoke with pharmacy and they states that you will just have to send in RX to replace the synjardy , they have no way to find out what is formulary or if the xigduo or steglatro-metformin is covered.     :(     Please advise ?

## 2019-09-23 ENCOUNTER — PRIOR AUTHORIZATION (OUTPATIENT)
Dept: ENDOCRINOLOGY | Facility: CLINIC | Age: 49
End: 2019-09-23

## 2019-09-23 ENCOUNTER — OFFICE VISIT (OUTPATIENT)
Dept: ENDOCRINOLOGY | Facility: CLINIC | Age: 49
End: 2019-09-23

## 2019-09-23 VITALS
HEIGHT: 68 IN | SYSTOLIC BLOOD PRESSURE: 102 MMHG | WEIGHT: 234 LBS | HEART RATE: 74 BPM | BODY MASS INDEX: 35.46 KG/M2 | OXYGEN SATURATION: 98 % | DIASTOLIC BLOOD PRESSURE: 80 MMHG

## 2019-09-23 DIAGNOSIS — E78.2 MIXED HYPERLIPIDEMIA: ICD-10-CM

## 2019-09-23 DIAGNOSIS — Z86.018 H/O PHEOCHROMOCYTOMA: ICD-10-CM

## 2019-09-23 DIAGNOSIS — E11.65 UNCONTROLLED TYPE 2 DIABETES MELLITUS WITH HYPERGLYCEMIA (HCC): Primary | ICD-10-CM

## 2019-09-23 LAB
GLUCOSE BLDC GLUCOMTR-MCNC: 240 MG/DL (ref 70–130)
HBA1C MFR BLD: 11.1 %

## 2019-09-23 PROCEDURE — 83036 HEMOGLOBIN GLYCOSYLATED A1C: CPT | Performed by: INTERNAL MEDICINE

## 2019-09-23 PROCEDURE — 82947 ASSAY GLUCOSE BLOOD QUANT: CPT | Performed by: INTERNAL MEDICINE

## 2019-09-23 PROCEDURE — 99214 OFFICE O/P EST MOD 30 MIN: CPT | Performed by: INTERNAL MEDICINE

## 2019-09-23 NOTE — PROGRESS NOTES
"Chief Complaint   Patient presents with   • Diabetes     follow-up       HPI:   Sandra Colon is a 49 y.o.male who returns to Endocrine Clinic for f/u evaluation of Type 2 DM, hyperlipidemia, and h/o pheochromocytoma. Last visit 03/13/2019.  His history is as follows:    Interim Events:   - still having intermittent abdominal pain in the right upper quadrant. Has Abd US on 09/16/2019  - pt had stopped taking his diabetes medications since last visit.     1) Type 2 diabetes with no known associated complications at this time:  - diagnosed in June 2018 - A1C% 11.90. Prescribed 3 oral agents by his new PCP  - pt stopped his medications post-op  - I restarted the medications in 11/2018, but patient he stopped the medications again  - reports the Janumet gave him headaches    Current DM Medications:   none    Glucometer Review: no meter for review    DM Health Maintenance:  Ophtho: Last visit - due  Podiatry: Last visit - N/A  Monofilament / Foot exam: due  Lipids: (03/2019) Tchol 201, , HDL 30,  -  On no medication, pt had stopped his statin  Urine Microalb/Cr ratio: (03/2019) normal at 11.8  CMP: (03/2019) Cr 0.95, GFR 84, ,   CBC: (03/2019) WNL  TSH: (06/2018) - normal    FMHx: He has a family history of diabetes but reports no known family history of hypertension    2) h/o left adrenal nodule (pheochromocytoma and nodular adrenocortical hyperplasia:  - initially evaluated by, gastroenterologist,  for diarrhea and elevated hepatic transaminases. As part of his evaluation he had a colonoscopy and an ultrasound of the abdomen completed at [facility] radiology on 8/29/17.  The ultrasound report had described a left kidney nodule that was later confirmed to be a left adrenal nodule on CT scan of the abdomen:   \"The right adrenal gland is normal. There is a low dense mass in the left adrenal gland measuring 3.0 x 4.3 cm. On the unenhanced image the adrenal mass has an attenuation " "coefficient of 28.5 Hounsfield units. The arterial phase demonstrates an attenuation coefficient of 33.2 Hounsfield units. The attenuation on venous imaging is 29.8 Hounsfield units and on delayed imaging 35.18 Hounsfield units. The mass demonstrates a thin rim of contrast enhancement primarily posteriorly.\"     See prior notes for biochemical evaluation prior to surgery.    - 10/22/2018:  pt underwent left adrenalectomy on 10/22/2018 by Dr. Dariusz Medrano. Intraoperative use of US with renal exploration was completed. No renal lesions were seen.     Surgical pathology: pheochromocytoma and nodular adrenocortical hyperplasia.   \"Microscopic Description: Sections reveal a neoplasm within the medullary area area of the adrenal gland composed of polygonal shaped cells with moderate to abundant granular faintly blue/purple cytoplasm. Intranuclear inclusions are noted. Other areas show more diffuse and solid arrangements of tumor cells. Nuclear hyperchromasia and pleomorphism is noted prominently. Scattered mitoses with up to 3 Immunohistochemical stains are performed and reported as follows: Chromogranin A +, GATA3 +, Calretinin (-) in cells of interest, Inhibin (-) in cells of interest, Melan-A  (-) in cells of interest. The overall findings ae those of pheochromocytoma. Additionally, there is a nodule composed of adrenocortical cells, c/w nodular adrenocortical hyperplasia.\"    Since last visit, no elevation in BP.  Last lab: (03/2019) plasma metanephrines normal     3) h/o HTN:   - pt has been off all antihypertensive medications post-operatively  - BP at home has been normal  - diastolic 80's - 90 in clinic    Review of Systems   Constitutional: Positive for unexpected weight change (loss). Negative for fatigue.   HENT: Negative.    Eyes: Negative.  Negative for itching.   Respiratory: Negative.    Cardiovascular: Negative.  Negative for leg swelling.   Gastrointestinal: Positive for abdominal pain (intermittent, right " and left sides).   Endocrine: Negative for polyuria.   Genitourinary: Negative.  Negative for frequency.   Musculoskeletal: Negative.  Negative for back pain.   Skin: Negative.    Allergic/Immunologic: Negative.    Neurological: Negative for headaches.   Hematological: Negative.    Psychiatric/Behavioral: Negative.      The following portions of the patient's history were reviewed and updated as appropriate: allergies, current medications, past family history, past medical history, past social history, past surgical history and problem list.    Vitals:    09/23/19 1607   BP: 102/80   Pulse: 74   SpO2: 98%       Physical Exam   Constitutional: He is oriented to person, place, and time. He appears well-developed. No distress.   obese   HENT:   Head: Normocephalic.   Mouth/Throat: Oropharynx is clear and moist.   Eyes: Conjunctivae and EOM are normal. Pupils are equal, round, and reactive to light.   Neck: Neck supple. No tracheal deviation present. No thyromegaly present.   No palpable thyroid nodules     Cardiovascular: Normal rate, regular rhythm and normal heart sounds.   No murmur heard.  Pulmonary/Chest: Effort normal and breath sounds normal. No respiratory distress.   Abdominal: Soft. Bowel sounds are normal. He exhibits no mass. There is no tenderness.   Surgical scar well healed   Musculoskeletal: He exhibits no edema.   Lymphadenopathy:     He has no cervical adenopathy.   Neurological: He is alert and oriented to person, place, and time. No cranial nerve deficit.   Skin: Skin is warm and dry. He is not diaphoretic. No erythema.   Acanthosis nigricans   Psychiatric: He has a normal mood and affect. His behavior is normal.   Vitals reviewed.    LABS/IMAGING: outside records reviewed and summarized in HPI  Office Visit on 09/23/2019   Component Date Value Ref Range Status   • Glucose 09/23/2019 240* 70 - 130 mg/dL Final   • Hemoglobin A1C 09/23/2019 11.1  % Final       ASSESSMENT/PLAN:  1) Type 2 diabetes with  no known associated complications at this time: uncontrolled, A1C% now 11.1   - due to patient stopping his diabetic medications  - I counseled pt on potential microvascular and macrovascular complications from uncontrolled diabetes; the significant positive effect carb consistent meal planning and modest weight loss can have on glycemic control; blood glucose goals for complication prevention; and mechanism of action of various diabetic medications    - will start Synjardy XR 12.1000 m tabs daily (advised pt he can try 1 tablet BID if better tolerated). Samples given to patient. Potential side effects reviewed. Pt to call for Rx once samples finished  - discussed with pt he will likely need a third agent    DM Health maintenance labs from 2019 reviewed. Will repeat at next visit    2) mixed hyperlipidemia  - patient taking lipitor 20 mg daily. Tolerating  - will check CMP, lipid panel next visit  Has elevated LFT's in 2019. H/o fatty liver.    3) h/o left pheochromocytoma: no evidence of recurrence  - s/p resection 10/22/2018  - clinically doing well after surgery. HAs have resolved. BP WNL at this time  - d/w with patient that he will need lifelong follow-up as pheochromocytomas can recur  - (2019) plasma metanephrines were normal   - d/w pt that although his BP has been normal, he may have underlying essential HTN and need medication in the future    RTC 4 months    Counseling was given to patient for the following topics:  instructions for management, risks and benefits of treatment options and importance of treatment compliance, see details in assessment/plan. Total face to face time of the encounter was 30 minutes and 25 minutes was spent counseling.

## 2020-01-27 ENCOUNTER — OFFICE VISIT (OUTPATIENT)
Dept: ENDOCRINOLOGY | Facility: CLINIC | Age: 50
End: 2020-01-27

## 2020-01-27 VITALS
SYSTOLIC BLOOD PRESSURE: 138 MMHG | BODY MASS INDEX: 35.31 KG/M2 | HEIGHT: 68 IN | HEART RATE: 66 BPM | WEIGHT: 233 LBS | OXYGEN SATURATION: 99 % | DIASTOLIC BLOOD PRESSURE: 96 MMHG

## 2020-01-27 DIAGNOSIS — Z86.018 H/O PHEOCHROMOCYTOMA: ICD-10-CM

## 2020-01-27 DIAGNOSIS — K76.0 FATTY LIVER: ICD-10-CM

## 2020-01-27 DIAGNOSIS — E78.2 MIXED HYPERLIPIDEMIA: ICD-10-CM

## 2020-01-27 DIAGNOSIS — E11.65 UNCONTROLLED TYPE 2 DIABETES MELLITUS WITH HYPERGLYCEMIA (HCC): Primary | ICD-10-CM

## 2020-01-27 LAB
EXPIRATION DATE: NORMAL
EXPIRATION DATE: NORMAL
GLUCOSE BLDC GLUCOMTR-MCNC: 102 MG/DL (ref 70–130)
HBA1C MFR BLD: 8.3 %
Lab: NORMAL
Lab: NORMAL

## 2020-01-27 PROCEDURE — 83036 HEMOGLOBIN GLYCOSYLATED A1C: CPT | Performed by: INTERNAL MEDICINE

## 2020-01-27 PROCEDURE — 82947 ASSAY GLUCOSE BLOOD QUANT: CPT | Performed by: INTERNAL MEDICINE

## 2020-01-27 PROCEDURE — 99214 OFFICE O/P EST MOD 30 MIN: CPT | Performed by: INTERNAL MEDICINE

## 2020-01-27 RX ORDER — ROSUVASTATIN CALCIUM 20 MG/1
20 TABLET, COATED ORAL DAILY
COMMUNITY
End: 2020-01-27 | Stop reason: DRUGHIGH

## 2020-01-27 RX ORDER — METFORMIN HYDROCHLORIDE 500 MG/1
TABLET, EXTENDED RELEASE ORAL
Qty: 60 TABLET | Refills: 5 | Status: SHIPPED | OUTPATIENT
Start: 2020-01-27 | End: 2020-06-01 | Stop reason: SDUPTHER

## 2020-01-27 RX ORDER — ATORVASTATIN CALCIUM 40 MG/1
40 TABLET, FILM COATED ORAL DAILY
Qty: 30 TABLET | Refills: 11 | Status: SHIPPED | OUTPATIENT
Start: 2020-01-27 | End: 2020-06-01 | Stop reason: SDUPTHER

## 2020-03-26 ENCOUNTER — APPOINTMENT (OUTPATIENT)
Dept: LAB | Facility: HOSPITAL | Age: 50
End: 2020-03-26

## 2020-03-26 ENCOUNTER — OFFICE VISIT (OUTPATIENT)
Dept: FAMILY MEDICINE CLINIC | Facility: CLINIC | Age: 50
End: 2020-03-26

## 2020-03-26 VITALS
DIASTOLIC BLOOD PRESSURE: 84 MMHG | SYSTOLIC BLOOD PRESSURE: 136 MMHG | OXYGEN SATURATION: 97 % | TEMPERATURE: 97.9 F | WEIGHT: 230 LBS | HEART RATE: 65 BPM | BODY MASS INDEX: 34.86 KG/M2 | RESPIRATION RATE: 20 BRPM | HEIGHT: 68 IN

## 2020-03-26 DIAGNOSIS — J22 LOWER RESPIRATORY TRACT INFECTION: Primary | ICD-10-CM

## 2020-03-26 DIAGNOSIS — R50.9 FEVER, UNSPECIFIED FEVER CAUSE: ICD-10-CM

## 2020-03-26 LAB
ALBUMIN SERPL-MCNC: 4.1 G/DL (ref 3.5–5.2)
ALBUMIN/GLOB SERPL: 1.4 G/DL
ALP SERPL-CCNC: 112 U/L (ref 39–117)
ALT SERPL W P-5'-P-CCNC: 72 U/L (ref 1–41)
ANION GAP SERPL CALCULATED.3IONS-SCNC: 11.2 MMOL/L (ref 5–15)
AST SERPL-CCNC: 32 U/L (ref 1–40)
BASOPHILS # BLD AUTO: 0.04 10*3/MM3 (ref 0–0.2)
BASOPHILS NFR BLD AUTO: 0.7 % (ref 0–1.5)
BILIRUB SERPL-MCNC: 0.5 MG/DL (ref 0.2–1.2)
BUN BLD-MCNC: 11 MG/DL (ref 6–20)
BUN/CREAT SERPL: 12.4 (ref 7–25)
CALCIUM SPEC-SCNC: 8.9 MG/DL (ref 8.6–10.5)
CHLORIDE SERPL-SCNC: 102 MMOL/L (ref 98–107)
CK SERPL-CCNC: 230 U/L (ref 20–200)
CO2 SERPL-SCNC: 24.8 MMOL/L (ref 22–29)
CREAT BLD-MCNC: 0.89 MG/DL (ref 0.76–1.27)
CRP SERPL-MCNC: 0.11 MG/DL (ref 0–0.5)
D-LACTATE SERPL-SCNC: 1.4 MMOL/L (ref 0.5–2)
DEPRECATED RDW RBC AUTO: 45.7 FL (ref 37–54)
EOSINOPHIL # BLD AUTO: 0.25 10*3/MM3 (ref 0–0.4)
EOSINOPHIL NFR BLD AUTO: 4.5 % (ref 0.3–6.2)
ERYTHROCYTE [DISTWIDTH] IN BLOOD BY AUTOMATED COUNT: 13.1 % (ref 12.3–15.4)
GFR SERPL CREATININE-BSD FRML MDRD: 90 ML/MIN/1.73
GLOBULIN UR ELPH-MCNC: 3 GM/DL
GLUCOSE BLD-MCNC: 152 MG/DL (ref 65–99)
HCT VFR BLD AUTO: 44.3 % (ref 37.5–51)
HGB BLD-MCNC: 15.4 G/DL (ref 13–17.7)
IMM GRANULOCYTES # BLD AUTO: 0.02 10*3/MM3 (ref 0–0.05)
IMM GRANULOCYTES NFR BLD AUTO: 0.4 % (ref 0–0.5)
LYMPHOCYTES # BLD AUTO: 1.87 10*3/MM3 (ref 0.7–3.1)
LYMPHOCYTES NFR BLD AUTO: 34 % (ref 19.6–45.3)
MCH RBC QN AUTO: 32.8 PG (ref 26.6–33)
MCHC RBC AUTO-ENTMCNC: 34.8 G/DL (ref 31.5–35.7)
MCV RBC AUTO: 94.3 FL (ref 79–97)
MONOCYTES # BLD AUTO: 0.76 10*3/MM3 (ref 0.1–0.9)
MONOCYTES NFR BLD AUTO: 13.8 % (ref 5–12)
NEUTROPHILS # BLD AUTO: 2.56 10*3/MM3 (ref 1.7–7)
NEUTROPHILS NFR BLD AUTO: 46.6 % (ref 42.7–76)
NRBC BLD AUTO-RTO: 0 /100 WBC (ref 0–0.2)
PLATELET # BLD AUTO: 212 10*3/MM3 (ref 140–450)
PMV BLD AUTO: 11.9 FL (ref 6–12)
POTASSIUM BLD-SCNC: 3.9 MMOL/L (ref 3.5–5.2)
PROCALCITONIN SERPL-MCNC: 0.05 NG/ML (ref 0.1–0.25)
PROT SERPL-MCNC: 7.1 G/DL (ref 6–8.5)
RBC # BLD AUTO: 4.7 10*6/MM3 (ref 4.14–5.8)
SODIUM BLD-SCNC: 138 MMOL/L (ref 136–145)
WBC NRBC COR # BLD: 5.5 10*3/MM3 (ref 3.4–10.8)

## 2020-03-26 PROCEDURE — 87635 SARS-COV-2 COVID-19 AMP PRB: CPT | Performed by: FAMILY MEDICINE

## 2020-03-26 PROCEDURE — 82550 ASSAY OF CK (CPK): CPT | Performed by: FAMILY MEDICINE

## 2020-03-26 PROCEDURE — 80053 COMPREHEN METABOLIC PANEL: CPT | Performed by: FAMILY MEDICINE

## 2020-03-26 PROCEDURE — 86140 C-REACTIVE PROTEIN: CPT | Performed by: FAMILY MEDICINE

## 2020-03-26 PROCEDURE — 85025 COMPLETE CBC W/AUTO DIFF WBC: CPT | Performed by: FAMILY MEDICINE

## 2020-03-26 PROCEDURE — 83605 ASSAY OF LACTIC ACID: CPT | Performed by: FAMILY MEDICINE

## 2020-03-26 PROCEDURE — 99214 OFFICE O/P EST MOD 30 MIN: CPT | Performed by: FAMILY MEDICINE

## 2020-03-26 PROCEDURE — 84145 PROCALCITONIN (PCT): CPT | Performed by: FAMILY MEDICINE

## 2020-03-26 PROCEDURE — U0003 INFECTIOUS AGENT DETECTION BY NUCLEIC ACID (DNA OR RNA); SEVERE ACUTE RESPIRATORY SYNDROME CORONAVIRUS 2 (SARS-COV-2) (CORONAVIRUS DISEASE [COVID-19]), AMPLIFIED PROBE TECHNIQUE, MAKING USE OF HIGH THROUGHPUT TECHNOLOGIES AS DESCRIBED BY CMS-2020-01-R: HCPCS | Performed by: FAMILY MEDICINE

## 2020-03-26 NOTE — PROGRESS NOTES
Subjective   Sandra Colon is a 50 y.o. male.     Chief Complaint   Patient presents with   • Back Pain     whole body hurts, and has had a fever, only bones hurt and it has been 2 weeks in a half, lower right    • Cough     since yesterday morning and chest pain, hurts to breath, feels weak and tired       History of Present Illness     Sandra Colon presents today for   Chief Complaint   Patient presents with   • Back Pain     whole body hurts, and has had a fever, only bones hurt and it has been 2 weeks in a half, lower right    • Cough     since yesterday morning and chest pain, hurts to breath, feels weak and tired       he reports symptoms ongoing for 14 days.  His wife and son had some diarrheal illness and fever a couple of weeks ago.  In the town where he lives there has been significant coronavirus activity, including one death and multiple quarantine's.  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  Starting about 2 weeks ago he states that he woke up with severe overall myalgias and malaise.  His temperature by thermometer at that time was 104 degrees.  He continued to monitor it periodically, it did come down with antipyretics to 98 to 99 degrees, but occasionally still spikes over 101.  He has not checked in the last day.  Starting last night he began to have a severe deep cough.  It has not been productive of sputum, he has had progressive shortness of breath and mild chest pain.  He continues to feel weak and has overall body aches which are fairly severe.  he has tried over-the-counter medications including acetaminophen and antihistamines with minimal improvement.  he has tried rest and fluids.  He has not noticed any sinus symptoms or congestion.  He initially had some diarrhea but this has resolved. he feels as though his symptoms are worsening.  He went to an outside urgent care last week and was tested for flu but was negative.    The following portions of the  patient's history were reviewed and updated as appropriate: allergies, current medications, past family history, past medical history, past social history, past surgical history and problem list.    Active Ambulatory Problems     Diagnosis Date Noted   • Fatty liver    • Mixed hyperlipidemia    • H/O pheochromocytoma 10/15/2017   • Uncontrolled type 2 diabetes mellitus with hyperglycemia (CMS/HCC) 06/11/2018     Resolved Ambulatory Problems     Diagnosis Date Noted   • Renal mass 06/11/2018   • Gross hematuria 06/11/2018     Past Medical History:   Diagnosis Date   • Adrenal tumor    • Diabetes mellitus (CMS/HCC)    • Hyperlipidemia    • Hypertension    • Kidney tumor      Past Surgical History:   Procedure Laterality Date   • CYST REMOVAL Left     on wrist   • KNEE SURGERY Left      Family History   Problem Relation Age of Onset   • No Known Problems Mother    • Diabetes Father    • No Known Problems Sister    • No Known Problems Brother    • Cancer Paternal Grandmother    • Diabetes Paternal Grandfather      Social History     Socioeconomic History   • Marital status:      Spouse name: Not on file   • Number of children: Not on file   • Years of education: Not on file   • Highest education level: Not on file   Tobacco Use   • Smoking status: Never Smoker   • Smokeless tobacco: Never Used   Substance and Sexual Activity   • Alcohol use: Yes     Alcohol/week: 2.0 standard drinks     Types: 2 Standard drinks or equivalent per week     Comment: only on weekends, none in 2 weeks   • Drug use: No   • Sexual activity: Defer   Social History Narrative    Lives in [place] with wife and 2 kids       Review of Systems  Review of Systems -   General ROS: positive for  - fever and malaise  negative for - hot flashes, night sweats or weight loss  ENT ROS: Negative for - headaches, nasal congestion, nasal discharge, sinus pain and sore throat  Respiratory ROS: positive for - cough  negative for - hemoptysis,  "pleuritic pain, shortness of breath or wheezing    Objective   Blood pressure 136/84, pulse 65, temperature 97.9 °F (36.6 °C), temperature source Temporal, resp. rate 20, height 172.7 cm (67.99\"), weight 104 kg (230 lb), SpO2 97 %.  Nursing note reviewed  Physical Exam  Const: Mildly ill-appearing but in no acute distress, A&Ox4, Pleasant, Cooperative  Eyes: EOMI, no conjunctivitis  ENT: No nasal discharge or sinus tenderness noted there is mild submandibular and anterior cervical lymphadenopathy consistent with symptoms.  Cardiac: Regular rate and rhythm, no cyanosis  Resp: Respiratory rate within normal limits, no increased work of breathing, no audible wheezing or retractions noted.  There are slight rhonchi noted, a deep dry cough is elicited on deep inspiration  GI: No distention or ascites.  He has some mild tenderness to palpation in the right lower quadrant.  Psych: Appropriate mood and behavior.  Skin: Warm, dry  Procedures  Assessment/Plan     Problem List Items Addressed This Visit     None      Visit Diagnoses     Lower respiratory tract infection    -  Primary    Relevant Orders    CBC & Differential    Comprehensive Metabolic Panel    C-reactive Protein    Procalcitonin    Lactic Acid, Plasma    CK    Fever, unspecified fever cause        Relevant Orders    CBC & Differential    Comprehensive Metabolic Panel    C-reactive Protein    Procalcitonin    Lactic Acid, Plasma    CK        Patient has significant exposure to community spread novel coronavirus as well as significant occupational exposure to multiple sources of infection.  He had a negative flu test last week at an outside urgent care.  -He is exceptionally high risk for complications due to his history  -We will check blood work today but I would like him to follow up with the [facility] urgent care for evaluation and possible COVID-19 testing.    Patient Instructions   Information from Dr. Marquez about novel coronavirus    STAY HOME! This " "is the best thing we can all do to prevent the spread of disease. You'll hear the term \"social distancing\" a lot, but what we mean is \"physical distancing\". Respiratory droplets can typically travel 6 feet in the air, which is where the 6 foot rule comes from. Grocery shop once per week, and use a [employer] service if available. Do not have visitors to your home. Get your flu shot yearly if you can. Stop touching your face. Wash your hands often with soap for at least 20 seconds.          What if I think I have COVID-19?    Since there is no curative medication for viral illnesses including COVID-19, much of the care is symptomatic and supportive. You do not need to come into clinic to be seen unless you worsen, and while there is a shortage of tests you would unfortunately not currently qualify for testing unless you are profoundly symptomatic and requiring hospitalization. If you think you may be sick, here is what I recommend:  -You should take your temperature twice per day (or every 2 hours for the first 48 hours if you have a known exposure to SARS-CoV2/COVID-19) and let us know if you develop any fever over 101.5.  If you do not have a thermometer at home, that is ok. Monitor your chest and back and if you feel hot to touch in these areas, consider this a fever. Also monitor for continuous cough (coughing a lot for more than an hour, or more than 3 episodes in 24 hours), shortness of breath, and muscle aches.  -It is okay to take Tylenol every 6 hours throughout the day for fever or muscle aches, but keep daily Tylenol amount below 2,000mg.  -Other than that you should use DayQuil cold and flu or equivalent for symptoms.  A teaspoon of honey helps a cough about as much as most cough medicines. Supplements which may be helpful are zinc and Elderberry, although the evidence for these is limited.  High doses of vitamin C may also help, and this can be obtained with citrus juice as easily as with a supplement. A " humidifier (such as [employer]'s) may help some of the nighttime symptoms of cough.  -Intranasal fluticasone 2 sprays each nostril BID and intranasal ipratropium 0.06% 2 sprays 4 times daily as needed may be prescribed for inflammation and sinus symptoms.   -As long as your symptoms are tolerable, you should stay at home and self isolate as much as possible.  If you are coughing or have sinus drainage do not go in public, and at all other times maintain 6 feet from others if possible.  If you develop a fever (>100.5), you should stay quarantined at home for at least 7 days after your last episode of fever.  If you worsen, develop shortness of breath or chest pain, or your fever spikes above 103, you should call back immediately.  -If you have symptoms of coronavirus, you'll need to stay at home for 7 days. If you live with someone who has symptoms, you'll need to stay at home for 14 days from the day the first person in the home started having symptoms.              Ambulatory progress note signed and attested to by Miguel Angel Marquez D.O.

## 2020-03-26 NOTE — PATIENT INSTRUCTIONS
"Information from Dr. Marquez about novel coronavirus    STAY HOME! This is the best thing we can all do to prevent the spread of disease. You'll hear the term \"social distancing\" a lot, but what we mean is \"physical distancing\". Respiratory droplets can typically travel 6 feet in the air, which is where the 6 foot rule comes from. Grocery shop once per week, and use a [employer] service if available. Do not have visitors to your home. Get your flu shot yearly if you can. Stop touching your face. Wash your hands often with soap for at least 20 seconds.          What if I think I have COVID-19?    Since there is no curative medication for viral illnesses including COVID-19, much of the care is symptomatic and supportive. You do not need to come into clinic to be seen unless you worsen, and while there is a shortage of tests you would unfortunately not currently qualify for testing unless you are profoundly symptomatic and requiring hospitalization. If you think you may be sick, here is what I recommend:  -You should take your temperature twice per day (or every 2 hours for the first 48 hours if you have a known exposure to SARS-CoV2/COVID-19) and let us know if you develop any fever over 101.5.  If you do not have a thermometer at home, that is ok. Monitor your chest and back and if you feel hot to touch in these areas, consider this a fever. Also monitor for continuous cough (coughing a lot for more than an hour, or more than 3 episodes in 24 hours), shortness of breath, and muscle aches.  -It is okay to take Tylenol every 6 hours throughout the day for fever or muscle aches, but keep daily Tylenol amount below 2,000mg.  -Other than that you should use DayQuil cold and flu or equivalent for symptoms.  A teaspoon of honey helps a cough about as much as most cough medicines. Supplements which may be helpful are zinc and Elderberry, although the evidence for these is limited.  High doses of vitamin C may also help, and this " can be obtained with citrus juice as easily as with a supplement. A humidifier (such as [employer]'s) may help some of the nighttime symptoms of cough.  -Intranasal fluticasone 2 sprays each nostril BID and intranasal ipratropium 0.06% 2 sprays 4 times daily as needed may be prescribed for inflammation and sinus symptoms.   -As long as your symptoms are tolerable, you should stay at home and self isolate as much as possible.  If you are coughing or have sinus drainage do not go in public, and at all other times maintain 6 feet from others if possible.  If you develop a fever (>100.5), you should stay quarantined at home for at least 7 days after your last episode of fever.  If you worsen, develop shortness of breath or chest pain, or your fever spikes above 103, you should call back immediately.  -If you have symptoms of coronavirus, you'll need to stay at home for 7 days. If you live with someone who has symptoms, you'll need to stay at home for 14 days from the day the first person in the home started having symptoms.

## 2020-03-31 ENCOUNTER — TELEPHONE (OUTPATIENT)
Dept: FAMILY MEDICINE CLINIC | Facility: CLINIC | Age: 50
End: 2020-03-31

## 2020-04-02 NOTE — TELEPHONE ENCOUNTER
Patient states that he feels bone pain in both arms and legs. Before he felt it was in his whole body when he got tested for the COVID 19 and now its just in his arms and legs. It starts in the evening and increases through the night. The pain has been getting better and he is not as concerned with it. He was mostly just wondering if his labs shown any reason to the pain hes felt. I further explained that we are waiting on the COVID 19 test at this moment but that once we get it back we will follow up with him for the results. The patient verbalized understanding and did not have any further questions at this time. He will let us know if any sx worsen.

## 2020-06-01 ENCOUNTER — OFFICE VISIT (OUTPATIENT)
Dept: ENDOCRINOLOGY | Facility: CLINIC | Age: 50
End: 2020-06-01

## 2020-06-01 VITALS
WEIGHT: 233 LBS | HEART RATE: 71 BPM | HEIGHT: 68 IN | BODY MASS INDEX: 35.31 KG/M2 | OXYGEN SATURATION: 98 % | DIASTOLIC BLOOD PRESSURE: 100 MMHG | SYSTOLIC BLOOD PRESSURE: 128 MMHG

## 2020-06-01 DIAGNOSIS — E11.65 UNCONTROLLED TYPE 2 DIABETES MELLITUS WITH HYPERGLYCEMIA (HCC): Primary | ICD-10-CM

## 2020-06-01 DIAGNOSIS — E78.2 MIXED HYPERLIPIDEMIA: ICD-10-CM

## 2020-06-01 DIAGNOSIS — Z86.018 H/O PHEOCHROMOCYTOMA: ICD-10-CM

## 2020-06-01 LAB
GLUCOSE BLDC GLUCOMTR-MCNC: 112 MG/DL (ref 70–130)
HBA1C MFR BLD: 9 %

## 2020-06-01 PROCEDURE — 83036 HEMOGLOBIN GLYCOSYLATED A1C: CPT | Performed by: INTERNAL MEDICINE

## 2020-06-01 PROCEDURE — 99214 OFFICE O/P EST MOD 30 MIN: CPT | Performed by: INTERNAL MEDICINE

## 2020-06-01 PROCEDURE — 82947 ASSAY GLUCOSE BLOOD QUANT: CPT | Performed by: INTERNAL MEDICINE

## 2020-06-01 RX ORDER — ATORVASTATIN CALCIUM 40 MG/1
40 TABLET, FILM COATED ORAL DAILY
Qty: 90 TABLET | Refills: 3 | Status: SHIPPED | OUTPATIENT
Start: 2020-06-01 | End: 2022-01-12 | Stop reason: SDUPTHER

## 2020-06-01 RX ORDER — METFORMIN HYDROCHLORIDE 500 MG/1
1000 TABLET, EXTENDED RELEASE ORAL
Qty: 180 TABLET | Refills: 3 | Status: SHIPPED | OUTPATIENT
Start: 2020-06-01 | End: 2021-02-26

## 2020-06-01 NOTE — PROGRESS NOTES
"Chief Complaint   Patient presents with   • Diabetes     DM 2 f/u    • Hyperlipidemia     f/u       HPI:   Sandra Colon is a 50 y.o.male who [facility] for f/u evaluation of Type 2 DM, hyperlipidemia, and h/o pheochromocytoma. Last visit 01/27/2020.  His history is as follows:    Interim Events:   - has been taking only one tab of metformin instead of 2 tabs  - has been taking the atorvastatin     1) Type 2 diabetes with no known associated complications at this time:  - diagnosed in June 2018 - A1C% 11.90. Prescribed 3 oral agents by his new PCP  - pt stopped his medications post-op in 10/2018  - I restarted the medications in 11/2018, but patient he stopped the medications again  - reports the Janumet gave him headaches  - did not tolerate Synjardy  - restarted metformin in 01/2020    Current DM Medications:   Metformin  mg: is supposed to be taking BID but is forgetting the second dose     Glucometer Review: no meter for review    DM Health Maintenance:  Ophtho: Last visit - due  Podiatry: Last visit - N/A  Monofilament / Foot exam: due  Lipids: (03/2019) Tchol 201, , HDL 30,  -  On no medication  Urine Microalb/Cr ratio: (03/2019) normal at 11.8  CMP: (03/2019) Cr 0.95, GFR 84, ,   CBC: (03/2019) WNL  TSH: (06/2018) - normal    FMHx: He has a family history of diabetes but reports no known family history of hypertension    2) h/o left adrenal nodule (pheochromocytoma and nodular adrenocortical hyperplasia:  - initially evaluated by, gastroenterologist,  for diarrhea and elevated hepatic transaminases. As part of his evaluation he had a colonoscopy and an ultrasound of the abdomen completed at [facility] radiology on 8/29/17.  The ultrasound report had described a left kidney nodule that was later confirmed to be a left adrenal nodule on CT scan of the abdomen:   \"The right adrenal gland is normal. There is a low dense mass in the left adrenal " "gland measuring 3.0 x 4.3 cm. On the unenhanced image the adrenal mass has an attenuation coefficient of 28.5 Hounsfield units. The arterial phase demonstrates an attenuation coefficient of 33.2 Hounsfield units. The attenuation on venous imaging is 29.8 Hounsfield units and on delayed imaging 35.18 Hounsfield units. The mass demonstrates a thin rim of contrast enhancement primarily posteriorly.\"     See prior notes for biochemical evaluation prior to surgery.    - 10/22/2018:  pt underwent left adrenalectomy on 10/22/2018 by Dr. Dariusz Medrano. Intraoperative use of US with renal exploration was completed. No renal lesions were seen.     Surgical pathology: pheochromocytoma and nodular adrenocortical hyperplasia.   \"Microscopic Description: Sections reveal a neoplasm within the medullary area area of the adrenal gland composed of polygonal shaped cells with moderate to abundant granular faintly blue/purple cytoplasm. Intranuclear inclusions are noted. Other areas show more diffuse and solid arrangements of tumor cells. Nuclear hyperchromasia and pleomorphism is noted prominently. Scattered mitoses with up to 3 Immunohistochemical stains are performed and reported as follows: Chromogranin A +, GATA3 +, Calretinin (-) in cells of interest, Inhibin (-) in cells of interest, Melan-A  (-) in cells of interest. The overall findings ae those of pheochromocytoma. Additionally, there is a nodule composed of adrenocortical cells, c/w nodular adrenocortical hyperplasia.\"    Since last visit, no significant elevation in BP. Diastolic level has been elevated past few visits  Last lab: (03/2019) plasma metanephrines normal     3) h/o HTN:   - pt has been off all antihypertensive medications post-operatively 10/2018  - BP at home has been normal  - BP today 128/100    4) mixed hyperlipidemia:   - currently taking atorvastatin 20 mg daily  - 2019 Abd US showed fatty liver    Review of Systems   Constitutional: Negative for fatigue. " "       Weight stable   HENT: Negative.    Eyes: Negative.  Negative for itching.   Respiratory: Negative.    Cardiovascular: Negative.  Negative for leg swelling.   Gastrointestinal: Positive for abdominal pain (intermittent, right upper quadrant).   Endocrine: Negative for polyuria.   Genitourinary: Negative.  Negative for frequency.   Musculoskeletal: Negative.  Negative for back pain.   Skin: Negative.    Allergic/Immunologic: Negative.    Neurological: Negative for headaches.   Hematological: Negative.    Psychiatric/Behavioral: Negative.        The following portions of the patient's history were reviewed and updated as appropriate: allergies, current medications, past family history, past medical history, past social history, past surgical history and problem list.    Vitals:    06/01/20 1555   BP: 128/100   Pulse: 71   SpO2: 98%   Weight: 106 kg (233 lb)   Height: 172.7 cm (67.99\")     Physical Exam   Constitutional: He is oriented to person, place, and time. He appears well-developed. No distress.   obese   HENT:   Head: Normocephalic.   Mouth/Throat: Oropharynx is clear and moist.   Eyes: Pupils are equal, round, and reactive to light. Conjunctivae and EOM are normal.   Neck: Neck supple. No tracheal deviation present. No thyromegaly present.   No palpable thyroid nodules     Cardiovascular: Normal rate, regular rhythm and normal heart sounds.   No murmur heard.  Pulmonary/Chest: Effort normal and breath sounds normal. No respiratory distress.   Abdominal: Soft. Bowel sounds are normal. He exhibits no mass. There is no tenderness.   Surgical scar well healed   Musculoskeletal: He exhibits no edema.   Lymphadenopathy:     He has no cervical adenopathy.   Neurological: He is alert and oriented to person, place, and time. No cranial nerve deficit.   Skin: Skin is warm and dry. He is not diaphoretic. No erythema.   Acanthosis nigricans   Psychiatric: He has a normal mood and affect. His behavior is normal. "   Vitals reviewed.    LABS/IMAGING: outside records reviewed and summarized in HPI  Office Visit on 2020   Component Date Value Ref Range Status   • Glucose 2020 112  70 - 130 mg/dL Final   • Hemoglobin A1C 2020 9.0  % Final       ASSESSMENT/PLAN:  1) Type 2 diabetes with no known associated complications at this time: uncontrolled, A1C% 9.0 today, was 8.3 last visit.   - I counseled pt that he will need more than one diabetic medication and will need to stay on it consistently.  - I counseled pt on potential microvascular and macrovascular complications from uncontrolled diabetes; the significant positive effect carb consistent meal planning and modest weight loss can have on glycemic control; blood glucose goals for complication prevention; and mechanism of action of various diabetic medications.    Advised pt to take metformin  m tabs with dinner  Will assess next visit and add another agent    DM Health maintenance labs due     2) mixed hyperlipidemia  - will increase atorvastatin to 40 mg daily  - will check CMP, lipid panel next visit  Has elevated LFT's in 2019. H/o fatty liver.    3) h/o left pheochromocytoma: no evidence of recurrence  - s/p resection 10/22/2018  - clinically doing well after surgery. HAs have resolved. BP has been WNL at home  - d/w with patient that he will need lifelong follow-up as pheochromocytomas can recur  - (2019) plasma metanephrines were normal   - d/w pt that although his BP has been normal, he may have underlying essential HTN and need medication in the future. BP slightly elevated today. 138/96    RTC 3 months    Counseling was given to patient for the following topics:  instructions for management, risks and benefits of treatment options and importance of treatment compliance, see details in assessment/plan. Total face to face time of the encounter was 30 minutes and 25 minutes was spent counseling.

## 2020-09-02 ENCOUNTER — OFFICE VISIT (OUTPATIENT)
Dept: ENDOCRINOLOGY | Facility: CLINIC | Age: 50
End: 2020-09-02

## 2020-09-02 VITALS
OXYGEN SATURATION: 98 % | DIASTOLIC BLOOD PRESSURE: 88 MMHG | HEIGHT: 68 IN | WEIGHT: 226.6 LBS | HEART RATE: 64 BPM | SYSTOLIC BLOOD PRESSURE: 128 MMHG | BODY MASS INDEX: 34.34 KG/M2

## 2020-09-02 DIAGNOSIS — E78.2 MIXED HYPERLIPIDEMIA: ICD-10-CM

## 2020-09-02 DIAGNOSIS — E11.9 CONTROLLED TYPE 2 DIABETES MELLITUS WITHOUT COMPLICATION, WITHOUT LONG-TERM CURRENT USE OF INSULIN (HCC): Primary | ICD-10-CM

## 2020-09-02 LAB
EXPIRATION DATE: NORMAL
EXPIRATION DATE: NORMAL
GLUCOSE BLDC GLUCOMTR-MCNC: 103 MG/DL (ref 70–130)
HBA1C MFR BLD: 7.4 %
Lab: NORMAL
Lab: NORMAL

## 2020-09-02 PROCEDURE — 85027 COMPLETE CBC AUTOMATED: CPT | Performed by: INTERNAL MEDICINE

## 2020-09-02 PROCEDURE — 80061 LIPID PANEL: CPT | Performed by: INTERNAL MEDICINE

## 2020-09-02 PROCEDURE — 99214 OFFICE O/P EST MOD 30 MIN: CPT | Performed by: INTERNAL MEDICINE

## 2020-09-02 PROCEDURE — 82947 ASSAY GLUCOSE BLOOD QUANT: CPT | Performed by: INTERNAL MEDICINE

## 2020-09-02 PROCEDURE — 82570 ASSAY OF URINE CREATININE: CPT | Performed by: INTERNAL MEDICINE

## 2020-09-02 PROCEDURE — 82043 UR ALBUMIN QUANTITATIVE: CPT | Performed by: INTERNAL MEDICINE

## 2020-09-02 PROCEDURE — 80053 COMPREHEN METABOLIC PANEL: CPT | Performed by: INTERNAL MEDICINE

## 2020-09-02 PROCEDURE — 84443 ASSAY THYROID STIM HORMONE: CPT | Performed by: INTERNAL MEDICINE

## 2020-09-02 PROCEDURE — 83036 HEMOGLOBIN GLYCOSYLATED A1C: CPT | Performed by: INTERNAL MEDICINE

## 2020-09-02 NOTE — PROGRESS NOTES
"Chief Complaint   Patient presents with   • Follow-up   • Diabetes   • Hyperlipidemia       HPI:   Sandra Colon is a 51 y.o.male who returns to Endocrine Clinic for f/u evaluation of Type 2 DM, hyperlipidemia, and h/o pheochromocytoma. Last visit 2020.  His history is as follows:    Interim Events:   - has been taking diabetic medications as directed. Has made dietary changes  - has been taking the atorvastatin     1) Type 2 diabetes with no known associated complications at this time:  - diagnosed in 2018 - A1C% 11.90. Prescribed 3 oral agents by his new PCP  - pt stopped his medications post-op in 10/2018  - I restarted the medications in 2018, but patient he stopped the medications again  - reports the Janumet gave him headaches  - did not tolerate Synjardy  - restarted metformin in 2020    Current DM Medications:   Metformin  m tabs every morning    Glucometer Review: checking FSBG 1-2 times per day  AM BGs : majority < 160  Evenin's - 200's    DM Health Maintenance:  Ophtho: Last visit - due  Podiatry: Last visit - N/A  Monofilament / Foot exam: due  Lipids: (2020) Tchol 119, , HDL 26, LDL 70 - on atorvastatin 40 mg  Urine Microalb/Cr ratio: (2020) normal   CMP: (2020) Cr 0.84, GFR 96, AST 51,   CBC: (2020) Hgb 14.4  TSH: (2020) - normal    FMHx: He has a family history of diabetes but reports no known family history of hypertension    2) h/o left adrenal nodule (pheochromocytoma and nodular adrenocortical hyperplasia:  - initially evaluated by, gastroenterologist,  for diarrhea and elevated hepatic transaminases. As part of his evaluation he had a colonoscopy and an ultrasound of the abdomen completed at [facility] radiology on 17.  The ultrasound report had described a left kidney nodule that was later confirmed to be a left adrenal nodule on CT scan of the abdomen:   \"The right adrenal gland is normal. There is a low dense mass " "in the left adrenal gland measuring 3.0 x 4.3 cm. On the unenhanced image the adrenal mass has an attenuation coefficient of 28.5 Hounsfield units. The arterial phase demonstrates an attenuation coefficient of 33.2 Hounsfield units. The attenuation on venous imaging is 29.8 Hounsfield units and on delayed imaging 35.18 Hounsfield units. The mass demonstrates a thin rim of contrast enhancement primarily posteriorly.\"     See prior notes for biochemical evaluation prior to surgery.    - 10/22/2018:  pt underwent left adrenalectomy on 10/22/2018 by Dr. Dariusz Medrano. Intraoperative use of US with renal exploration was completed. No renal lesions were seen.     Surgical pathology: pheochromocytoma and nodular adrenocortical hyperplasia.   \"Microscopic Description: Sections reveal a neoplasm within the medullary area area of the adrenal gland composed of polygonal shaped cells with moderate to abundant granular faintly blue/purple cytoplasm. Intranuclear inclusions are noted. Other areas show more diffuse and solid arrangements of tumor cells. Nuclear hyperchromasia and pleomorphism is noted prominently. Scattered mitoses with up to 3 Immunohistochemical stains are performed and reported as follows: Chromogranin A +, GATA3 +, Calretinin (-) in cells of interest, Inhibin (-) in cells of interest, Melan-A  (-) in cells of interest. The overall findings ae those of pheochromocytoma. Additionally, there is a nodule composed of adrenocortical cells, c/w nodular adrenocortical hyperplasia.\"    Since last visit, no significant elevation in BP. Diastolic level has been elevated past few visits  Last lab: (03/2019) plasma metanephrines normal     3) h/o HTN:   - pt has been off all antihypertensive medications post-operatively 10/2018  - BP at home has been normal      4) mixed hyperlipidemia:   - currently taking atorvastatin 20 mg daily  - 2019 Abd US showed fatty liver    Review of Systems   Constitutional: Negative for fatigue. " "       Weight loss   HENT: Negative.    Eyes: Negative.  Negative for itching.   Respiratory: Negative.    Cardiovascular: Negative.  Negative for leg swelling.   Gastrointestinal: Positive for abdominal pain (intermittent, right upper quadrant).   Endocrine: Negative for polyuria.   Genitourinary: Negative.  Negative for frequency.   Musculoskeletal: Negative.  Negative for back pain.   Skin: Negative.    Allergic/Immunologic: Negative.    Neurological: Negative for headaches.   Hematological: Negative.    Psychiatric/Behavioral: Negative.        The following portions of the patient's history were reviewed and updated as appropriate: allergies, current medications, past family history, past medical history, past social history, past surgical history and problem list.    Vitals:    09/02/20 1408   BP: 128/88   Pulse: 64   SpO2: 98%   Weight: 103 kg (226 lb 9.6 oz)   Height: 172.7 cm (67.99\")     Physical Exam   Constitutional: He is oriented to person, place, and time. He appears well-developed. No distress.   obese   HENT:   Head: Normocephalic.   Mouth/Throat: Oropharynx is clear and moist.   Eyes: Pupils are equal, round, and reactive to light. Conjunctivae and EOM are normal.   Neck: Neck supple. No tracheal deviation present. No thyromegaly present.   No palpable thyroid nodules     Cardiovascular: Normal rate, regular rhythm and normal heart sounds.   No murmur heard.  Pulmonary/Chest: Effort normal and breath sounds normal. No respiratory distress.   Abdominal: Soft. Bowel sounds are normal. He exhibits no mass. There is no tenderness.   Surgical scar well healed   Musculoskeletal: He exhibits no edema.   Lymphadenopathy:     He has no cervical adenopathy.   Neurological: He is alert and oriented to person, place, and time. No cranial nerve deficit.   Skin: Skin is warm and dry. He is not diaphoretic. No erythema.   Acanthosis nigricans   Psychiatric: He has a normal mood and affect. His behavior is normal. "   Vitals reviewed.    LABS/IMAGING: outside records reviewed and summarized in HPI  Office Visit on 09/02/2020   Component Date Value Ref Range Status   • Glucose 09/02/2020 103  70 - 130 mg/dL Final   • Lot Number 09/02/2020 [id number]   Final   • Expiration Date 09/02/2020 [id number]   Final   • Hemoglobin A1C 09/02/2020 7.4  % Final   • Lot Number 09/02/2020 [id number]   Final   • Expiration Date 09/02/2020 [id number]   Final   • WBC 09/02/2020 6.68  3.40 - 10.80 10*3/mm3 Final   • RBC 09/02/2020 4.45  4.14 - 5.80 10*6/mm3 Final   • Hemoglobin 09/02/2020 14.4  13.0 - 17.7 g/dL Final   • Hematocrit 09/02/2020 43.5  37.5 - 51.0 % Final   • MCV 09/02/2020 97.8* 79.0 - 97.0 fL Final   • MCH 09/02/2020 32.4  26.6 - 33.0 pg Final   • MCHC 09/02/2020 33.1  31.5 - 35.7 g/dL Final   • RDW 09/02/2020 13.4  12.3 - 15.4 % Final   • RDW-SD 09/02/2020 48.4  37.0 - 54.0 fl Final   • MPV 09/02/2020 11.1  6.0 - 12.0 fL Final   • Platelets 09/02/2020 174  140 - 450 10*3/mm3 Final   • Glucose 09/02/2020 98  65 - 99 mg/dL Final   • BUN 09/02/2020 10  6 - 20 mg/dL Final   • Creatinine 09/02/2020 0.84  0.76 - 1.27 mg/dL Final   • Sodium 09/02/2020 139  136 - 145 mmol/L Final   • Potassium 09/02/2020 3.9  3.5 - 5.2 mmol/L Final   • Chloride 09/02/2020 102  98 - 107 mmol/L Final   • CO2 09/02/2020 24.4  22.0 - 29.0 mmol/L Final   • Calcium 09/02/2020 9.2  8.6 - 10.5 mg/dL Final   • Total Protein 09/02/2020 7.3  6.0 - 8.5 g/dL Final   • Albumin 09/02/2020 3.70  3.50 - 5.20 g/dL Final   • ALT (SGPT) 09/02/2020 112* 1 - 41 U/L Final   • AST (SGOT) 09/02/2020 51* 1 - 40 U/L Final   • Alkaline Phosphatase 09/02/2020 95  39 - 117 U/L Final   • Total Bilirubin 09/02/2020 0.6  0.0 - 1.2 mg/dL Final   • eGFR Non African Amer 09/02/2020 96  >60 mL/min/1.73 Final   • Globulin 09/02/2020 3.6  gm/dL Final   • A/G Ratio 09/02/2020 1.0  g/dL Final   • BUN/Creatinine Ratio 09/02/2020 11.9  7.0 - 25.0 Final   • Anion Gap 09/02/2020 12.6  5.0 - 15.0  mmol/L Final   • Total Cholesterol 2020 119  0 - 200 mg/dL Final   • Triglycerides 2020 116  0 - 150 mg/dL Final   • HDL Cholesterol 2020 26* 40 - 60 mg/dL Final   • LDL Cholesterol  2020 70  0 - 100 mg/dL Final   • VLDL Cholesterol 2020 23.2  5 - 40 mg/dL Final   • LDL/HDL Ratio 2020 2.68   Final   • Microalbumin/Creatinine Ratio 2020    Final    Unable to calculate   • Creatinine, Urine 2020 162.4  mg/dL Final   • Microalbumin, Urine 2020 <1.2  mg/dL Final   • TSH 2020 1.960  0.270 - 4.200 uIU/mL Final       ASSESSMENT/PLAN:  1) Type 2 diabetes with no known associated complications at this time: controlled, A1C% 7.4 today down from 9.0 last visit.  - I counseled pt on potential microvascular and macrovascular complications from uncontrolled diabetes; the significant positive effect carb consistent meal planning and modest weight loss can have on glycemic control; blood glucose goals for complication prevention; and mechanism of action of various diabetic medications.    Advised pt to take metformin  m tabs with dinner  Will assess next visit and add another agent    DM Health maintenance labs completed today    2) mixed hyperlipidemia:  - continue atorvastatin 40 mg daily  - CMP, lipid panel completed today  Has elevated LFT's since 2019. H/o fatty liver.    3) h/o left pheochromocytoma: no evidence of recurrence  - s/p resection 10/22/2018  - clinically doing well after surgery. HAs have resolved. BP has been WNL at home  - d/w with patient that he will need lifelong follow-up as pheochromocytomas can recur  - (2019) plasma metanephrines were normal   - d/w pt that although his BP has been normal, he may have underlying essential HTN and need medication in the future.     RTC 6 months    Counseling was given to patient for the following topics:  instructions for management, risks and benefits of treatment options and importance of  treatment compliance, see details in assessment/plan. Total face to face time of the encounter was 30 minutes and 25 minutes was spent counseling.    Signed: Roma Abbott MD

## 2020-09-03 LAB
ALBUMIN SERPL-MCNC: 3.7 G/DL (ref 3.5–5.2)
ALBUMIN UR-MCNC: <1.2 MG/DL
ALBUMIN/GLOB SERPL: 1 G/DL
ALP SERPL-CCNC: 95 U/L (ref 39–117)
ALT SERPL W P-5'-P-CCNC: 112 U/L (ref 1–41)
ANION GAP SERPL CALCULATED.3IONS-SCNC: 12.6 MMOL/L (ref 5–15)
AST SERPL-CCNC: 51 U/L (ref 1–40)
BILIRUB SERPL-MCNC: 0.6 MG/DL (ref 0–1.2)
BUN SERPL-MCNC: 10 MG/DL (ref 6–20)
BUN/CREAT SERPL: 11.9 (ref 7–25)
CALCIUM SPEC-SCNC: 9.2 MG/DL (ref 8.6–10.5)
CHLORIDE SERPL-SCNC: 102 MMOL/L (ref 98–107)
CHOLEST SERPL-MCNC: 119 MG/DL (ref 0–200)
CO2 SERPL-SCNC: 24.4 MMOL/L (ref 22–29)
CREAT SERPL-MCNC: 0.84 MG/DL (ref 0.76–1.27)
CREAT UR-MCNC: 162.4 MG/DL
DEPRECATED RDW RBC AUTO: 48.4 FL (ref 37–54)
ERYTHROCYTE [DISTWIDTH] IN BLOOD BY AUTOMATED COUNT: 13.4 % (ref 12.3–15.4)
GFR SERPL CREATININE-BSD FRML MDRD: 96 ML/MIN/1.73
GLOBULIN UR ELPH-MCNC: 3.6 GM/DL
GLUCOSE SERPL-MCNC: 98 MG/DL (ref 65–99)
HCT VFR BLD AUTO: 43.5 % (ref 37.5–51)
HDLC SERPL-MCNC: 26 MG/DL (ref 40–60)
HGB BLD-MCNC: 14.4 G/DL (ref 13–17.7)
LDLC SERPL CALC-MCNC: 70 MG/DL (ref 0–100)
LDLC/HDLC SERPL: 2.68 {RATIO}
MCH RBC QN AUTO: 32.4 PG (ref 26.6–33)
MCHC RBC AUTO-ENTMCNC: 33.1 G/DL (ref 31.5–35.7)
MCV RBC AUTO: 97.8 FL (ref 79–97)
MICROALBUMIN/CREAT UR: NORMAL MG/G{CREAT}
PLATELET # BLD AUTO: 174 10*3/MM3 (ref 140–450)
PMV BLD AUTO: 11.1 FL (ref 6–12)
POTASSIUM SERPL-SCNC: 3.9 MMOL/L (ref 3.5–5.2)
PROT SERPL-MCNC: 7.3 G/DL (ref 6–8.5)
RBC # BLD AUTO: 4.45 10*6/MM3 (ref 4.14–5.8)
SODIUM SERPL-SCNC: 139 MMOL/L (ref 136–145)
TRIGL SERPL-MCNC: 116 MG/DL (ref 0–150)
TSH SERPL DL<=0.05 MIU/L-ACNC: 1.96 UIU/ML (ref 0.27–4.2)
VLDLC SERPL-MCNC: 23.2 MG/DL (ref 5–40)
WBC # BLD AUTO: 6.68 10*3/MM3 (ref 3.4–10.8)

## 2020-09-30 ENCOUNTER — HOSPITAL ENCOUNTER (OUTPATIENT)
Dept: GENERAL RADIOLOGY | Facility: HOSPITAL | Age: 50
Discharge: HOME | End: 2020-09-30
Admitting: FAMILY MEDICINE

## 2020-09-30 ENCOUNTER — OFFICE VISIT (OUTPATIENT)
Dept: FAMILY MEDICINE CLINIC | Facility: CLINIC | Age: 50
End: 2020-09-30

## 2020-09-30 VITALS
BODY MASS INDEX: 35.61 KG/M2 | SYSTOLIC BLOOD PRESSURE: 124 MMHG | OXYGEN SATURATION: 98 % | HEIGHT: 68 IN | HEART RATE: 54 BPM | WEIGHT: 235 LBS | DIASTOLIC BLOOD PRESSURE: 80 MMHG

## 2020-09-30 DIAGNOSIS — M25.511 ACUTE PAIN OF RIGHT SHOULDER: ICD-10-CM

## 2020-09-30 DIAGNOSIS — M54.6 ACUTE RIGHT-SIDED THORACIC BACK PAIN: ICD-10-CM

## 2020-09-30 DIAGNOSIS — M54.6 ACUTE RIGHT-SIDED THORACIC BACK PAIN: Primary | ICD-10-CM

## 2020-09-30 PROCEDURE — 72070 X-RAY EXAM THORAC SPINE 2VWS: CPT

## 2020-09-30 PROCEDURE — 96372 THER/PROPH/DIAG INJ SC/IM: CPT | Performed by: FAMILY MEDICINE

## 2020-09-30 PROCEDURE — 99214 OFFICE O/P EST MOD 30 MIN: CPT | Performed by: FAMILY MEDICINE

## 2020-09-30 PROCEDURE — 73030 X-RAY EXAM OF SHOULDER: CPT

## 2020-09-30 RX ORDER — METHYLPREDNISOLONE ACETATE 80 MG/ML
80 INJECTION, SUSPENSION INTRA-ARTICULAR; INTRALESIONAL; INTRAMUSCULAR; SOFT TISSUE ONCE
Status: COMPLETED | OUTPATIENT
Start: 2020-09-30 | End: 2020-09-30

## 2020-09-30 RX ADMIN — METHYLPREDNISOLONE ACETATE 80 MG: 80 INJECTION, SUSPENSION INTRA-ARTICULAR; INTRALESIONAL; INTRAMUSCULAR; SOFT TISSUE at 11:23

## 2020-09-30 NOTE — PROGRESS NOTES
Subjective   Sandra Colon is a 50 y.o. male.     Chief Complaint   Patient presents with   • Back Pain     x2days       History of Present Illness     Sandra Colon presents today for   Chief Complaint   Patient presents with   • Back Pain     x2days     Right-sided upper thoracic pain radiating down through shoulder and into her right arm since 2 days ago.  Got so bad last night he could barely drive and was having significant pain at rest.  Pain is severe 8/10 at its worst, currently is more moderate 5/10.  Sharp and shooting when it comes on.  It is relieved by direct pressure on the right paraspinal muscle region around T3.    This patient is accompanied by their self who contributes to the history of their care.    The following portions of the patient's history were reviewed and updated as appropriate: allergies, current medications, past family history, past medical history, past social history, past surgical history and problem list.    Active Ambulatory Problems     Diagnosis Date Noted   • Fatty liver    • Mixed hyperlipidemia    • H/O pheochromocytoma 10/15/2017   • Uncontrolled type 2 diabetes mellitus with hyperglycemia (CMS/HCC) 06/11/2018     Resolved Ambulatory Problems     Diagnosis Date Noted   • Renal mass 06/11/2018   • Gross hematuria 06/11/2018     Past Medical History:   Diagnosis Date   • Adrenal tumor    • Diabetes mellitus (CMS/HCC)    • Hyperlipidemia    • Hypertension    • Kidney tumor      Past Surgical History:   Procedure Laterality Date   • CYST REMOVAL Left     on wrist   • KNEE SURGERY Left      Family History   Problem Relation Age of Onset   • No Known Problems Mother    • Diabetes Father    • No Known Problems Sister    • No Known Problems Brother    • Cancer Paternal Grandmother    • Diabetes Paternal Grandfather      Social History     Socioeconomic History   • Marital status:      Spouse name: Not on file   • Number of children: Not on file   • Years of  "education: Not on file   • Highest education level: Not on file   Tobacco Use   • Smoking status: Never Smoker   • Smokeless tobacco: Never Used   Substance and Sexual Activity   • Alcohol use: Yes     Alcohol/week: 2.0 standard drinks     Types: 2 Standard drinks or equivalent per week     Comment: only on weekends, none in 2 weeks   • Drug use: No   • Sexual activity: Defer   Social History Narrative    Lives in [place] with wife and 2 kids       Review of Systems  Review of Systems -  General ROS: negative for - chills, fever or night sweats  Cardiovascular ROS: no chest pain or dyspnea on exertion  Gastrointestinal ROS: no abdominal pain, change in bowel habits, or black or bloody stools  Genito-Urinary ROS: no trouble voiding or gross hematuria    Objective   Blood pressure 124/80, pulse 54, height 172.7 cm (68\"), weight 107 kg (235 lb), SpO2 98 %.  Nursing note reviewed  Physical Exam  Const: NAD, A&Ox4, Pleasant, Cooperative  MSK: Visibly in pain but no severe distress.  Rotator cuff exam shows no weakness but pain is elicited with resisted abduction and external rotation.  May have some rotator cuff dysfunction overlying as well.  He has tenderness to palpation over the right lateral spinal aspect between T2 and T5  Procedures  Assessment/Plan     Problem List Items Addressed This Visit     None      Visit Diagnoses     Acute right-sided thoracic back pain    -  Primary    2 days    Relevant Medications    methylPREDNISolone acetate (DEPO-medrol) injection 80 mg (Start on 9/30/2020 11:23 AM)    Other Relevant Orders    XR Shoulder 2+ View Right    XR Spine Thoracic 2 View    Ambulatory Referral to Physical Therapy Evaluate and treat    Acute pain of right shoulder        Relevant Medications    methylPREDNISolone acetate (DEPO-medrol) injection 80 mg (Start on 9/30/2020 11:23 AM)    Other Relevant Orders    XR Shoulder 2+ View Right    XR Spine Thoracic 2 View    Ambulatory Referral to Physical Therapy " Evaluate and treat        See patient diagnoses and orders along with patient instructions for assessment, plan, and changes to care for patient.    There are no Patient Instructions on file for this visit.    No follow-ups on file.    Ambulatory progress note signed and attested to by Miguel Angel Marquez D.O.

## 2020-10-01 ENCOUNTER — TELEPHONE (OUTPATIENT)
Dept: FAMILY MEDICINE CLINIC | Facility: CLINIC | Age: 50
End: 2020-10-01

## 2020-10-01 DIAGNOSIS — S22.009A CLOSED FRACTURE OF THORACIC VERTEBRA, UNSPECIFIED FRACTURE MORPHOLOGY, UNSPECIFIED THORACIC VERTEBRAL LEVEL, INITIAL ENCOUNTER (HCC): Primary | ICD-10-CM

## 2020-10-01 NOTE — TELEPHONE ENCOUNTER
Pt Contacted, Message given. Pt said he would head over to the ER right now, he states he is in a lot of pain.

## 2020-10-01 NOTE — TELEPHONE ENCOUNTER
If his pain is worse functional, unfortunately I would recommend the ER where they can do further imaging if needed.  X-rays were unremarkable yesterday

## 2020-10-01 NOTE — TELEPHONE ENCOUNTER
PATIENT CALLED AND SAID HE SAW DR MUSTAFA YESTERDAY AND WAS GIVEN A SHOT FOR HIS BACK PAIN AND THIS MORNING IT IS WORSE.he SAID IT HAVING A HARD TIME WALKING AND THE PAIN IS EXTREMELY BAD    ADMILDO: [phone]

## 2020-10-01 NOTE — TELEPHONE ENCOUNTER
PT CALLED STATED THAT HE IS AT  ER, REGARDING ISSUE WITH HIS BACK,  HAS DONE A CT SCAN AND FOUND FRACTURE ON SPINE AND ADVISED PT TO CALL PCP TO SEE ABOUT GETTING A MRI ORDER FOR HIS BACK BECAUSE THE AREA ON BACK THAT HURTS IS RIGHT BELOW THE FRACTURED AREA ON BACK.    PLEASE ADVISE.  CALL BACK:[phone]

## 2020-10-01 NOTE — TELEPHONE ENCOUNTER
MRI ordered, but hopefully they refer him to a spinal surgeon if it is needed.  CT scan is usually sufficient for fracture

## 2020-10-02 ENCOUNTER — HOSPITAL ENCOUNTER (OUTPATIENT)
Dept: MRI IMAGING | Facility: HOSPITAL | Age: 50
Discharge: HOME | End: 2020-10-02
Admitting: FAMILY MEDICINE

## 2020-10-02 DIAGNOSIS — S22.009A CLOSED FRACTURE OF THORACIC VERTEBRA, UNSPECIFIED FRACTURE MORPHOLOGY, UNSPECIFIED THORACIC VERTEBRAL LEVEL, INITIAL ENCOUNTER (HCC): ICD-10-CM

## 2020-10-02 PROCEDURE — A9577 INJ MULTIHANCE: HCPCS | Performed by: FAMILY MEDICINE

## 2020-10-02 PROCEDURE — 82565 ASSAY OF CREATININE: CPT

## 2020-10-02 PROCEDURE — 0 GADOBENATE DIMEGLUMINE 529 MG/ML SOLUTION: Performed by: FAMILY MEDICINE

## 2020-10-02 PROCEDURE — 72157 MRI CHEST SPINE W/O & W/DYE: CPT

## 2020-10-02 RX ADMIN — GADOBENATE DIMEGLUMINE 20 ML: 529 INJECTION, SOLUTION INTRAVENOUS at 12:52

## 2020-10-02 NOTE — TELEPHONE ENCOUNTER
Contacted patient, he has completed his MRI this morning.     He is currently prescribed pain medication from the ED but they have not started any referrals. I advised the patient that the doctor will most likely wait until MRI results return . He verbalized understanding and agreed

## 2020-10-03 LAB — CREAT BLDA-MCNC: 0.8 MG/DL (ref 0.6–1.3)

## 2020-10-05 ENCOUNTER — TELEPHONE (OUTPATIENT)
Dept: FAMILY MEDICINE CLINIC | Facility: CLINIC | Age: 50
End: 2020-10-05

## 2020-10-05 NOTE — TELEPHONE ENCOUNTER
Caller: Sandra Colon    Relationship: Self    Best call back number: [phone]    Caller requesting test results: SELF    What test was performed: MRI     When was the test performed: 10/02/20    Where was the test performed:     Additional notes:

## 2020-10-06 NOTE — TELEPHONE ENCOUNTER
PT CALLED STATED THAT HIS BACK IS IN HORRIBLE PAIN, T HAD MRI DONE ON Friday AND WOULD TO HEAR BACK ON RESULTS OF MRI.    PLEASE ADVISE.  CALL BACK:[phone]

## 2020-10-07 ENCOUNTER — TELEPHONE (OUTPATIENT)
Dept: FAMILY MEDICINE CLINIC | Facility: CLINIC | Age: 50
End: 2020-10-07

## 2020-10-07 DIAGNOSIS — M54.6 ACUTE RIGHT-SIDED THORACIC BACK PAIN: Primary | ICD-10-CM

## 2020-10-07 DIAGNOSIS — S22.009A CLOSED FRACTURE OF THORACIC VERTEBRA, UNSPECIFIED FRACTURE MORPHOLOGY, UNSPECIFIED THORACIC VERTEBRAL LEVEL, INITIAL ENCOUNTER (HCC): ICD-10-CM

## 2020-10-07 NOTE — TELEPHONE ENCOUNTER
PATIENT CALLED REQUESTING A CALL BACK REGARDING MRI RESULTS. PAIN IS AWFUL. WANTS TO KNOW WHAT IS NEXT STEP.    Sandra Colon   [phone]

## 2020-10-07 NOTE — TELEPHONE ENCOUNTER
PATIENT HAS CALLED AND HAS REQUESTED A CALL BACK TO DISCUSS OPTIONS ON A REFERRAL FOR A [facility] SPECIALIST IN REGARDS TO HIS SPINE    PLEASE ADVISE    CALL BACK NUMBER [phone]

## 2020-10-07 NOTE — TELEPHONE ENCOUNTER
MRI did not show any acute findings or fracture. There were some mild disc protrusions but no severe stenosis or findings. I would recommend he follow up with the orthopedist/spinal specialist recommended by [facility].

## 2020-11-30 ENCOUNTER — OFFICE VISIT (OUTPATIENT)
Dept: NEUROSURGERY | Facility: CLINIC | Age: 50
End: 2020-11-30

## 2020-11-30 VITALS — WEIGHT: 234 LBS | HEIGHT: 68 IN | BODY MASS INDEX: 35.46 KG/M2

## 2020-11-30 DIAGNOSIS — M54.6 ACUTE MIDLINE THORACIC BACK PAIN: Primary | ICD-10-CM

## 2020-11-30 PROCEDURE — 99203 OFFICE O/P NEW LOW 30 MIN: CPT | Performed by: PHYSICIAN ASSISTANT

## 2020-11-30 RX ORDER — CYCLOBENZAPRINE HCL 10 MG
10 TABLET ORAL 3 TIMES DAILY PRN
Qty: 60 TABLET | Refills: 2 | Status: SHIPPED | OUTPATIENT
Start: 2020-11-30 | End: 2021-03-03

## 2020-11-30 RX ORDER — MELOXICAM 15 MG/1
15 TABLET ORAL DAILY
Qty: 60 TABLET | Refills: 2 | Status: SHIPPED | OUTPATIENT
Start: 2020-11-30 | End: 2021-03-03

## 2021-02-26 DIAGNOSIS — E11.65 UNCONTROLLED TYPE 2 DIABETES MELLITUS WITH HYPERGLYCEMIA (HCC): ICD-10-CM

## 2021-02-26 RX ORDER — METFORMIN HYDROCHLORIDE 500 MG/1
1000 TABLET, EXTENDED RELEASE ORAL
Qty: 60 TABLET | Refills: 11 | Status: SHIPPED | OUTPATIENT
Start: 2021-02-26 | End: 2021-03-03 | Stop reason: SDUPTHER

## 2021-03-03 ENCOUNTER — OFFICE VISIT (OUTPATIENT)
Dept: ENDOCRINOLOGY | Facility: CLINIC | Age: 51
End: 2021-03-03

## 2021-03-03 VITALS
DIASTOLIC BLOOD PRESSURE: 68 MMHG | HEIGHT: 66 IN | OXYGEN SATURATION: 98 % | SYSTOLIC BLOOD PRESSURE: 122 MMHG | HEART RATE: 59 BPM | WEIGHT: 229 LBS | BODY MASS INDEX: 36.8 KG/M2

## 2021-03-03 DIAGNOSIS — E11.65 UNCONTROLLED TYPE 2 DIABETES MELLITUS WITH HYPERGLYCEMIA (HCC): Primary | ICD-10-CM

## 2021-03-03 DIAGNOSIS — E78.2 MIXED HYPERLIPIDEMIA: ICD-10-CM

## 2021-03-03 LAB
EXPIRATION DATE: ABNORMAL
GLUCOSE BLDC GLUCOMTR-MCNC: 184 MG/DL (ref 70–130)
HBA1C MFR BLD: 8.4 %
Lab: ABNORMAL

## 2021-03-03 PROCEDURE — 83036 HEMOGLOBIN GLYCOSYLATED A1C: CPT | Performed by: INTERNAL MEDICINE

## 2021-03-03 PROCEDURE — 99213 OFFICE O/P EST LOW 20 MIN: CPT | Performed by: INTERNAL MEDICINE

## 2021-03-03 PROCEDURE — 82947 ASSAY GLUCOSE BLOOD QUANT: CPT | Performed by: INTERNAL MEDICINE

## 2021-03-03 RX ORDER — METFORMIN HYDROCHLORIDE 500 MG/1
1000 TABLET, EXTENDED RELEASE ORAL
Qty: 120 TABLET | Refills: 11 | Status: SHIPPED | OUTPATIENT
Start: 2021-03-03 | End: 2021-08-09 | Stop reason: SDUPTHER

## 2021-03-22 NOTE — PROGRESS NOTES
"Chief Complaint   Patient presents with   • Diabetes     follow up       HPI:   Sandra Colon is a 51 y.o.male who returns to [facility]ocrine Clinic for f/u evaluation of Type 2 DM, hyperlipidemia, and h/o pheochromocytoma. Last visit 2020.  His history is as follows:    Interim Events:   - has been taking diabetic medication as directed. Has made dietary changes  - has been taking the atorvastatin     1) Type 2 diabetes with no known associated complications at this time:  - diagnosed in 2018 - A1C% 11.90. Prescribed 3 oral agents by his new PCP  - pt stopped his medications post-op in 10/2018  - I restarted the medications in 2018, but patient he stopped the medications again  - reports the Janumet gave him headaches  - did not tolerate Synjardy  - restarted metformin in 2020    Current DM Medications:   Metformin  m tabs every morning    Glucometer Review: checking FSBG 1-2 times per day  Forgot meter for review    DM Health Maintenance:  Ophtho: Last visit - due  Podiatry: Last visit - N/A  Monofilament / Foot exam: due  Lipids: (2020) Tchol 119, , HDL 26, LDL 70 - on atorvastatin 40 mg  Urine Microalb/Cr ratio: (2020) normal   CMP: (2020) Cr 0.84, GFR 96, AST 51,   CBC: (2020) Hgb 14.4  TSH: (2020) - normal    FMHx: He has a family history of diabetes but reports no known family history of hypertension    2) h/o left adrenal nodule (pheochromocytoma and nodular adrenocortical hyperplasia:  - initially evaluated by, gastroenterologist,  for diarrhea and elevated hepatic transaminases. As part of his evaluation he had a colonoscopy and an ultrasound of the abdomen completed at [facility] radiology on 17.  The ultrasound report had described a left kidney nodule that was later confirmed to be a left adrenal nodule on CT scan of the abdomen:   \"The right adrenal gland is normal. There is a low dense mass in the left adrenal gland measuring 3.0 x " "4.3 cm. On the unenhanced image the adrenal mass has an attenuation coefficient of 28.5 Hounsfield units. The arterial phase demonstrates an attenuation coefficient of 33.2 Hounsfield units. The attenuation on venous imaging is 29.8 Hounsfield units and on delayed imaging 35.18 Hounsfield units. The mass demonstrates a thin rim of contrast enhancement primarily posteriorly.\"     See prior notes for biochemical evaluation prior to surgery.    - 10/22/2018:  pt underwent left adrenalectomy on 10/22/2018 by Dr. Dariusz Medrano. Intraoperative use of US with renal exploration was completed. No renal lesions were seen.     Surgical pathology: pheochromocytoma and nodular adrenocortical hyperplasia.   \"Microscopic Description: Sections reveal a neoplasm within the medullary area area of the adrenal gland composed of polygonal shaped cells with moderate to abundant granular faintly blue/purple cytoplasm. Intranuclear inclusions are noted. Other areas show more diffuse and solid arrangements of tumor cells. Nuclear hyperchromasia and pleomorphism is noted prominently. Scattered mitoses with up to 3 Immunohistochemical stains are performed and reported as follows: Chromogranin A +, GATA3 +, Calretinin (-) in cells of interest, Inhibin (-) in cells of interest, Melan-A  (-) in cells of interest. The overall findings ae those of pheochromocytoma. Additionally, there is a nodule composed of adrenocortical cells, c/w nodular adrenocortical hyperplasia.\"    Since last visit, no significant elevation in BP.   Last lab: (03/2019) plasma metanephrines normal     3) h/o HTN:   - pt has been off all antihypertensive medications post-operatively 10/2018  - BP at home has been normal    4) mixed hyperlipidemia:   - currently taking atorvastatin 20 mg daily  - 2019 Abd US showed fatty liver    Review of Systems   Constitutional: Negative for fatigue.        Weight loss   HENT: Negative.    Eyes: Negative.  Negative for itching. " "  Respiratory: Negative.    Cardiovascular: Negative.  Negative for leg swelling.   Gastrointestinal: Positive for abdominal pain (intermittent, right upper quadrant).   Endocrine: Negative for polyuria.   Genitourinary: Negative.  Negative for frequency.   Musculoskeletal: Negative.  Negative for back pain.   Skin: Negative.    Allergic/Immunologic: Negative.    Neurological: Negative for headaches.   Hematological: Negative.    Psychiatric/Behavioral: Negative.        The following portions of the patient's history were reviewed and updated as appropriate: allergies, current medications, past family history, past medical history, past social history, past surgical history and problem list.    Vitals:    03/03/21 1457   BP: 122/68   Pulse: 59   SpO2: 98%   Weight: 104 kg (229 lb)   Height: 167.6 cm (66\")     Physical Exam   Constitutional: He is oriented to person, place, and time. He appears well-developed. No distress.   overweight   HENT:   Head: Normocephalic.   Eyes: Pupils are equal, round, and reactive to light. Conjunctivae are normal.   Neck: No tracheal deviation present. No thyromegaly present.   No palpable thyroid nodules     Cardiovascular: Normal rate, regular rhythm and normal heart sounds.   No murmur heard.  Pulmonary/Chest: Effort normal and breath sounds normal. No respiratory distress.   Abdominal: Soft. Bowel sounds are normal. He exhibits no mass. There is no abdominal tenderness.   Surgical scar well healed   Lymphadenopathy:     He has no cervical adenopathy.   Neurological: He is alert and oriented to person, place, and time. No cranial nerve deficit.   Skin: Skin is warm and dry. He is not diaphoretic. No erythema.   Acanthosis nigricans   Psychiatric: His behavior is normal.   Vitals reviewed.    LABS/IMAGING: outside records reviewed and summarized in HPI  Office Visit on 03/03/2021   Component Date Value Ref Range Status   • Glucose 03/03/2021 184* 70 - 130 mg/dL Final   • Lot Number " 2021 [id number]   Final   • Expiration Date 2021   Final   • Hemoglobin A1C 2021 8.4  % Final       ASSESSMENT/PLAN:  1) Type 2 diabetes with no known associated complications at this time: uncontrolled, with hyperglycemia, A1C% 8.4 today up from 7.4% last visit.  - I counseled pt on potential microvascular and macrovascular complications from uncontrolled diabetes; the significant positive effect carb consistent meal planning and modest weight loss can have on glycemic control; blood glucose goals for complication prevention; and mechanism of action of various diabetic medications.    Discussed option of adding another agent. Pt declined at this time and wants to focus on dietary changes  Increased metformin  m tabs BID with food    Will assess next visit and add another agent if indicated    DM Health maintenance labs next visit    2) mixed hyperlipidemia:  - continue atorvastatin 40 mg daily  - CMP, lipid panel completed 2020  Has elevated LFT's since 2019. H/o fatty liver.  Advised pt to decrease alcohol intake.     3) h/o left pheochromocytoma: no evidence of recurrence  - s/p resection 10/22/2018  - clinically doing well after surgery. HAs have resolved. BP has been WNL at home  - d/w with patient that he will need lifelong follow-up as pheochromocytomas can recur  - (2019) plasma metanephrines were normal   - d/w pt that although his BP has been normal, he may have underlying essential HTN and need medication in the future.     RTC 5 months      Signed: Roma Abbott MD

## 2021-08-09 ENCOUNTER — LAB (OUTPATIENT)
Dept: LAB | Facility: HOSPITAL | Age: 51
End: 2021-08-09

## 2021-08-09 ENCOUNTER — OFFICE VISIT (OUTPATIENT)
Dept: ENDOCRINOLOGY | Facility: CLINIC | Age: 51
End: 2021-08-09

## 2021-08-09 VITALS
WEIGHT: 225 LBS | SYSTOLIC BLOOD PRESSURE: 130 MMHG | HEIGHT: 68 IN | DIASTOLIC BLOOD PRESSURE: 80 MMHG | OXYGEN SATURATION: 98 % | BODY MASS INDEX: 34.1 KG/M2 | HEART RATE: 62 BPM

## 2021-08-09 DIAGNOSIS — I10 ESSENTIAL HYPERTENSION: ICD-10-CM

## 2021-08-09 DIAGNOSIS — E11.9 TYPE 2 DIABETES MELLITUS WITHOUT COMPLICATION, WITHOUT LONG-TERM CURRENT USE OF INSULIN (HCC): Primary | ICD-10-CM

## 2021-08-09 DIAGNOSIS — Z86.018 H/O PHEOCHROMOCYTOMA: ICD-10-CM

## 2021-08-09 DIAGNOSIS — E78.2 MIXED HYPERLIPIDEMIA: ICD-10-CM

## 2021-08-09 LAB
DEPRECATED RDW RBC AUTO: 44.1 FL (ref 37–54)
ERYTHROCYTE [DISTWIDTH] IN BLOOD BY AUTOMATED COUNT: 12.8 % (ref 12.3–15.4)
GLUCOSE BLDC GLUCOMTR-MCNC: 125 MG/DL (ref 70–130)
HBA1C MFR BLD: 7.6 %
HCT VFR BLD AUTO: 42.6 % (ref 37.5–51)
HGB BLD-MCNC: 14.3 G/DL (ref 13–17.7)
MCH RBC QN AUTO: 31.8 PG (ref 26.6–33)
MCHC RBC AUTO-ENTMCNC: 33.6 G/DL (ref 31.5–35.7)
MCV RBC AUTO: 94.7 FL (ref 79–97)
PLATELET # BLD AUTO: 201 10*3/MM3 (ref 140–450)
PMV BLD AUTO: 11 FL (ref 6–12)
RBC # BLD AUTO: 4.5 10*6/MM3 (ref 4.14–5.8)
WBC # BLD AUTO: 5.61 10*3/MM3 (ref 3.4–10.8)

## 2021-08-09 PROCEDURE — 83036 HEMOGLOBIN GLYCOSYLATED A1C: CPT | Performed by: INTERNAL MEDICINE

## 2021-08-09 PROCEDURE — 82607 VITAMIN B-12: CPT | Performed by: INTERNAL MEDICINE

## 2021-08-09 PROCEDURE — 80053 COMPREHEN METABOLIC PANEL: CPT | Performed by: INTERNAL MEDICINE

## 2021-08-09 PROCEDURE — 82570 ASSAY OF URINE CREATININE: CPT | Performed by: INTERNAL MEDICINE

## 2021-08-09 PROCEDURE — 85027 COMPLETE CBC AUTOMATED: CPT | Performed by: INTERNAL MEDICINE

## 2021-08-09 PROCEDURE — 82043 UR ALBUMIN QUANTITATIVE: CPT | Performed by: INTERNAL MEDICINE

## 2021-08-09 PROCEDURE — 99214 OFFICE O/P EST MOD 30 MIN: CPT | Performed by: INTERNAL MEDICINE

## 2021-08-09 PROCEDURE — 80061 LIPID PANEL: CPT | Performed by: INTERNAL MEDICINE

## 2021-08-09 PROCEDURE — 84443 ASSAY THYROID STIM HORMONE: CPT | Performed by: INTERNAL MEDICINE

## 2021-08-09 PROCEDURE — 82947 ASSAY GLUCOSE BLOOD QUANT: CPT | Performed by: INTERNAL MEDICINE

## 2021-08-09 RX ORDER — BENAZEPRIL HYDROCHLORIDE AND HYDROCHLOROTHIAZIDE 5; 6.25 MG/1; MG/1
1 TABLET ORAL DAILY
Qty: 30 TABLET | Refills: 11 | Status: SHIPPED | OUTPATIENT
Start: 2021-08-09 | End: 2022-07-11 | Stop reason: SDUPTHER

## 2021-08-09 RX ORDER — METFORMIN HYDROCHLORIDE 500 MG/1
1000 TABLET, EXTENDED RELEASE ORAL
Qty: 120 TABLET | Refills: 11 | Status: SHIPPED | OUTPATIENT
Start: 2021-08-09 | End: 2022-07-11 | Stop reason: SDUPTHER

## 2021-08-10 LAB
ALBUMIN SERPL-MCNC: 3.9 G/DL (ref 3.5–5.2)
ALBUMIN UR-MCNC: 1.3 MG/DL
ALBUMIN/GLOB SERPL: 1.6 G/DL
ALP SERPL-CCNC: 124 U/L (ref 39–117)
ALT SERPL W P-5'-P-CCNC: 53 U/L (ref 1–41)
ANION GAP SERPL CALCULATED.3IONS-SCNC: 5.6 MMOL/L (ref 5–15)
AST SERPL-CCNC: 18 U/L (ref 1–40)
BILIRUB SERPL-MCNC: 0.5 MG/DL (ref 0–1.2)
BUN SERPL-MCNC: 8 MG/DL (ref 6–20)
BUN/CREAT SERPL: 10 (ref 7–25)
CALCIUM SPEC-SCNC: 8.4 MG/DL (ref 8.6–10.5)
CHLORIDE SERPL-SCNC: 105 MMOL/L (ref 98–107)
CHOLEST SERPL-MCNC: 174 MG/DL (ref 0–200)
CO2 SERPL-SCNC: 27.4 MMOL/L (ref 22–29)
CREAT SERPL-MCNC: 0.8 MG/DL (ref 0.76–1.27)
CREAT UR-MCNC: 249 MG/DL
GFR SERPL CREATININE-BSD FRML MDRD: 102 ML/MIN/1.73
GLOBULIN UR ELPH-MCNC: 2.5 GM/DL
GLUCOSE SERPL-MCNC: 121 MG/DL (ref 65–99)
HDLC SERPL-MCNC: 30 MG/DL (ref 40–60)
LDLC SERPL CALC-MCNC: 130 MG/DL (ref 0–100)
LDLC/HDLC SERPL: 4.31 {RATIO}
MICROALBUMIN/CREAT UR: 5.2 MG/G
POTASSIUM SERPL-SCNC: 3.9 MMOL/L (ref 3.5–5.2)
PROT SERPL-MCNC: 6.4 G/DL (ref 6–8.5)
SODIUM SERPL-SCNC: 138 MMOL/L (ref 136–145)
TRIGL SERPL-MCNC: 74 MG/DL (ref 0–150)
TSH SERPL DL<=0.05 MIU/L-ACNC: 1.75 UIU/ML (ref 0.27–4.2)
VIT B12 BLD-MCNC: 524 PG/ML (ref 211–946)
VLDLC SERPL-MCNC: 14 MG/DL (ref 5–40)

## 2021-09-09 PROBLEM — I10 ESSENTIAL HYPERTENSION: Status: ACTIVE | Noted: 2021-09-09

## 2021-12-20 ENCOUNTER — OFFICE VISIT (OUTPATIENT)
Dept: FAMILY MEDICINE CLINIC | Facility: CLINIC | Age: 51
End: 2021-12-20

## 2021-12-20 VITALS
TEMPERATURE: 97.8 F | BODY MASS INDEX: 32.71 KG/M2 | HEIGHT: 68 IN | DIASTOLIC BLOOD PRESSURE: 92 MMHG | OXYGEN SATURATION: 98 % | HEART RATE: 76 BPM | WEIGHT: 215.8 LBS | SYSTOLIC BLOOD PRESSURE: 140 MMHG

## 2021-12-20 DIAGNOSIS — Z86.018 HISTORY OF BENIGN ADRENAL TUMOR: ICD-10-CM

## 2021-12-20 DIAGNOSIS — R10.30 LOWER ABDOMINAL PAIN: ICD-10-CM

## 2021-12-20 DIAGNOSIS — K64.9 HEMORRHOIDS, UNSPECIFIED HEMORRHOID TYPE: Primary | ICD-10-CM

## 2021-12-20 PROCEDURE — 99214 OFFICE O/P EST MOD 30 MIN: CPT | Performed by: NURSE PRACTITIONER

## 2021-12-20 NOTE — PROGRESS NOTES
Chief Complaint   Patient presents with   • Recal Bleeding     Swelling    • Abdominal Pain       Subjective   Admang Colon is a 51 y.o. male    History of Present Illness  Patient today with complaints of lower abdominal/rectal pain.  He states is been going on for about 3 weeks but the last 2 to 3 days has worsened.  He states he touches something in his rectal region and causes pain that radiates around to the lower portion of his abdomen.  He denies any rectal bleeding.  He also states he has a burning type across his lower abdomen when he eats.  Does have a history of colonoscopy 5 to 6 years ago that he reports it was in [place].  We will obtain records of this.  He did have a CT of the abdomen pelvis in June 2018 and at that time showed a solid and cystic mass on the left adrenal gland, a hypoattenuating lesion in the superior pole of the left kidney with a second similar lesion in the upper pole and medially within the right kidney.  He did have surgery for the adrenal mass.  He also has noted over the past few days a worsening sort of firmness in the left abdominal region where his scar from his adrenal mass surgery is.    Allergies   Allergen Reactions   • Lisinopril Rash     Past Medical History:   Diagnosis Date   • Adrenal tumor    • Diabetes mellitus (HCC)    • Fatty liver    • Hyperlipidemia    • Hypertension    • Kidney tumor       Past Surgical History:   Procedure Laterality Date   • CYST REMOVAL Left     on wrist   • KNEE SURGERY Left      Social History     Socioeconomic History   • Marital status:    Tobacco Use   • Smoking status: Never Smoker   • Smokeless tobacco: Never Used   Vaping Use   • Vaping Use: Never used   Substance and Sexual Activity   • Alcohol use: Yes     Alcohol/week: 2.0 standard drinks     Types: 2 Standard drinks or equivalent per week     Comment: only on weekends, none in 2 weeks   • Drug use: No   • Sexual activity: Defer        Current Outpatient  Medications:   •  glucose blood test strip, 1 strip by Subdermal route Daily., Disp: , Rfl:   •  Lancet Devices misc, 1 Piece by Subdermal route Daily., Disp: , Rfl:   •  metFORMIN ER (GLUCOPHAGE-XR) 500 MG 24 hr tablet, Take 2 tablets by mouth Daily With Breakfast & Dinner., Disp: 120 tablet, Rfl: 11  •  atorvastatin (LIPITOR) 40 MG tablet, Take 1 tablet by mouth Daily., Disp: 90 tablet, Rfl: 3  •  benazepril-hydrochlorthiazide (LOTENSIN HCT) 5-6.25 MG per tablet, Take 1 tablet by mouth Daily., Disp: 30 tablet, Rfl: 11     Review of Systems   Constitutional: Positive for unexpected weight change. Negative for chills, fatigue and fever.   Respiratory: Negative for cough, chest tightness, shortness of breath and wheezing.    Cardiovascular: Negative for chest pain, palpitations and leg swelling.   Gastrointestinal: Positive for abdominal pain and rectal pain. Negative for constipation, diarrhea, nausea and vomiting.   Genitourinary: Negative for difficulty urinating and dysuria.   Skin: Negative for color change and rash.   Neurological: Negative for dizziness, syncope, weakness and headaches.   Psychiatric/Behavioral: Negative for sleep disturbance.       Objective     Vitals:    12/20/21 1525   BP: 140/92   Pulse: 76   Temp: 97.8 °F (36.6 °C)   SpO2: 98%         12/20/21  1525   Weight: 97.9 kg (215 lb 12.8 oz)     Body mass index is 32.82 kg/m².  Results for orders placed or performed in visit on 08/09/21   CBC (No Diff)    Specimen: Blood   Result Value Ref Range    WBC 5.61 3.40 - 10.80 10*3/mm3    RBC 4.50 4.14 - 5.80 10*6/mm3    Hemoglobin 14.3 13.0 - 17.7 g/dL    Hematocrit 42.6 37.5 - 51.0 %    MCV 94.7 79.0 - 97.0 fL    MCH 31.8 26.6 - 33.0 pg    MCHC 33.6 31.5 - 35.7 g/dL    RDW 12.8 12.3 - 15.4 %    RDW-SD 44.1 37.0 - 54.0 fl    MPV 11.0 6.0 - 12.0 fL    Platelets 201 140 - 450 10*3/mm3   Comprehensive Metabolic Panel    Specimen: Blood   Result Value Ref Range    Glucose 121 (H) 65 - 99 mg/dL    BUN 8 6  - 20 mg/dL    Creatinine 0.80 0.76 - 1.27 mg/dL    Sodium 138 136 - 145 mmol/L    Potassium 3.9 3.5 - 5.2 mmol/L    Chloride 105 98 - 107 mmol/L    CO2 27.4 22.0 - 29.0 mmol/L    Calcium 8.4 (L) 8.6 - 10.5 mg/dL    Total Protein 6.4 6.0 - 8.5 g/dL    Albumin 3.90 3.50 - 5.20 g/dL    ALT (SGPT) 53 (H) 1 - 41 U/L    AST (SGOT) 18 1 - 40 U/L    Alkaline Phosphatase 124 (H) 39 - 117 U/L    Total Bilirubin 0.5 0.0 - 1.2 mg/dL    eGFR Non African Amer 102 >60 mL/min/1.73    Globulin 2.5 gm/dL    A/G Ratio 1.6 g/dL    BUN/Creatinine Ratio 10.0 7.0 - 25.0    Anion Gap 5.6 5.0 - 15.0 mmol/L   Lipid Panel    Specimen: Blood   Result Value Ref Range    Total Cholesterol 174 0 - 200 mg/dL    Triglycerides 74 0 - 150 mg/dL    HDL Cholesterol 30 (L) 40 - 60 mg/dL    LDL Cholesterol  130 (H) 0 - 100 mg/dL    VLDL Cholesterol 14 5 - 40 mg/dL    LDL/HDL Ratio 4.31    Microalbumin / Creatinine Urine Ratio - Urine, Clean Catch    Specimen: Urine, Clean Catch   Result Value Ref Range    Microalbumin/Creatinine Ratio 5.2 mg/g    Creatinine, Urine 249.0 mg/dL    Microalbumin, Urine 1.3 mg/dL   TSH    Specimen: Blood   Result Value Ref Range    TSH 1.750 0.270 - 4.200 uIU/mL   Vitamin B12    Specimen: Blood   Result Value Ref Range    Vitamin B-12 524 211 - 946 pg/mL   POC Glucose, Blood    Specimen: Blood   Result Value Ref Range    Glucose 125 70 - 130 mg/dL   POC Glycosylated Hemoglobin (Hb A1C)    Specimen: Blood   Result Value Ref Range    Hemoglobin A1C 7.6 %       Physical Exam  Vitals reviewed.   Constitutional:       Appearance: He is well-developed.   HENT:      Head: Normocephalic and atraumatic.   Cardiovascular:      Rate and Rhythm: Normal rate and regular rhythm.      Heart sounds: Normal heart sounds.   Pulmonary:      Effort: Pulmonary effort is normal.      Breath sounds: Normal breath sounds.   Abdominal:      General: Abdomen is flat. Bowel sounds are normal.      Palpations: Abdomen is soft.      Tenderness: There is  abdominal tenderness (lower across ABD, full left sided).   Genitourinary:     Comments: With multiple hemorrhoids protruding from rectal region, Tender to touch  Skin:     General: Skin is warm and dry.   Neurological:      Mental Status: He is alert and oriented to person, place, and time.   Psychiatric:         Behavior: Behavior normal.         Assessment/Plan   Problems Addressed this Visit     None      Visit Diagnoses     Hemorrhoids, unspecified hemorrhoid type    -  Primary    Relevant Orders    Ambulatory Referral to Gastroenterology (Completed)    Lower abdominal pain        Relevant Orders    CT Abdomen Pelvis Without Contrast    History of benign adrenal tumor          Diagnoses       Codes Comments    Hemorrhoids, unspecified hemorrhoid type    -  Primary ICD-10-CM: K64.9  ICD-9-CM: 455.6     Lower abdominal pain     ICD-10-CM: R10.30  ICD-9-CM: 789.09     History of benign adrenal tumor     ICD-10-CM: Z86.018  ICD-9-CM: V12.29       I am going to send him to see [facility] to have his hemorrhoids and lower abdominal pain further delineated.  I want to get a CT of his abdomen and pelvis without contrast, I am going to obtain copies of the most recent colonoscopy, Continue current medications. Patient was encouraged to keep me informed of any acute changes, lack of improvement, or any new concerning symptoms.  If patient becomes concerned, or has any worsening symptoms, they are to report either here, urgent treatment, or emergency room. Plan of care discussed with pt. They verbalized understanding and agreement.     Time spent on visit, including counseling, education, reviewing the chart, and any recent test results, was  20      Minutes    Return visit in 2 weeks with Dr Marquez or Me    Counseling provided on hemorrhoids.    Yao Irving, APRN   12/20/2021   17:00 EST     Please note that portions of this document were completed with a voice recognition program.

## 2021-12-28 ENCOUNTER — HOSPITAL ENCOUNTER (OUTPATIENT)
Dept: CT IMAGING | Facility: HOSPITAL | Age: 51
Discharge: HOME | End: 2021-12-28
Admitting: NURSE PRACTITIONER

## 2021-12-28 DIAGNOSIS — R10.30 LOWER ABDOMINAL PAIN: ICD-10-CM

## 2021-12-28 PROCEDURE — 74176 CT ABD & PELVIS W/O CONTRAST: CPT

## 2021-12-29 NOTE — PROGRESS NOTES
The CT of the abdomen shows no acute changes that would be related to his pain.  Does show some clips from prior surgeries.  Make sure we get a copy of his colonoscopy from [place].

## 2022-01-03 ENCOUNTER — OFFICE VISIT (OUTPATIENT)
Dept: FAMILY MEDICINE CLINIC | Facility: CLINIC | Age: 52
End: 2022-01-03

## 2022-01-03 VITALS
SYSTOLIC BLOOD PRESSURE: 132 MMHG | OXYGEN SATURATION: 97 % | HEIGHT: 68 IN | BODY MASS INDEX: 32.71 KG/M2 | DIASTOLIC BLOOD PRESSURE: 90 MMHG | WEIGHT: 215.8 LBS | HEART RATE: 70 BPM | RESPIRATION RATE: 16 BRPM

## 2022-01-03 DIAGNOSIS — E11.65 UNCONTROLLED TYPE 2 DIABETES MELLITUS WITH HYPERGLYCEMIA: ICD-10-CM

## 2022-01-03 DIAGNOSIS — K64.9 HEMORRHOIDS, UNSPECIFIED HEMORRHOID TYPE: Primary | ICD-10-CM

## 2022-01-03 PROCEDURE — 99213 OFFICE O/P EST LOW 20 MIN: CPT | Performed by: FAMILY MEDICINE

## 2022-01-03 RX ORDER — HYDROCORTISONE ACETATE PRAMOXINE HCL 1; 1 G/100G; G/100G
CREAM TOPICAL 2 TIMES DAILY PRN
Qty: 28.4 G | Refills: 1 | Status: SHIPPED | OUTPATIENT
Start: 2022-01-03

## 2022-01-04 NOTE — PROGRESS NOTES
Established Patient Office Visit      Patient Name: Sandra Colon  : 1970   MRN: [id number]   Care Team: Patient Care Team:  Miguel Angel Marquez DO as PCP - General (Family Medicine)  Miguel Angel Marquez DO as Referring Physician (Family Medicine)  Roma Abbott MD as Consulting Physician (Endocrinology)    Chief Complaint:    Chief Complaint   Patient presents with   • Hemorrhoids     2 WEEK FOLLOW UP   • Rectal Bleeding   • Abdominal Pain       History of Present Illness: Sandra Colon is a 51 y.o. male who is here today for chief complaint.    HPI    The patient has consented to being recorded using JULES.     Mr. Colon presents today for follow-up on hemorrhoids and rectal bleeding. He was seen by my colleague, SHAWNEE Gaspar, on 2021 for the same. CT of the abdomen and pelvis was reassuring in terms of any acute pathology. He was referred to [facility] for further workup, and It appears he has an appointment on 2022 at 9:30 AM with Dr. Peck.    He reports the hemorrhoids are still presents. He notes his rectum is swollen, warm to touch and painful. He denies using any topical creams; however, he is taking Advil to help with the pain.     He reports that he has not been able to control his diabetes. He is currently taking metformin. He states that he discontinued his diet 3 weeks ago and ate a lot of carbohydrates. He reports that his blood glucose levels were previously between 130 and 140 mg/dL, and sometimes it went up to 160 mg/dL; however, now he states that his blood glucose levels have been between 180 and 350 mg/dL. He has an appointment with his diabetic provider on 2022 at 1:00pm.     This patient is accompanied by their self who contributes to the history of their care.    The following portions of the patient's history were reviewed and updated as appropriate: allergies, current medications, past family history, past  medical history, past social history, past surgical history and problem list.    Subjective      Review of Systems:   Review of Systems - See HPI    Past Medical History:   Past Medical History:   Diagnosis Date   • Adrenal tumor    • Diabetes mellitus (HCC)    • Fatty liver    • Hyperlipidemia    • Hypertension    • Kidney tumor        Past Surgical History:   Past Surgical History:   Procedure Laterality Date   • CYST REMOVAL Left     on wrist   • KNEE SURGERY Left        Family History:   Family History   Problem Relation Age of Onset   • No Known Problems Mother    • Diabetes Father    • No Known Problems Sister    • No Known Problems Brother    • Cancer Paternal Grandmother    • Diabetes Paternal Grandfather        Social History:   Social History     Socioeconomic History   • Marital status:    Tobacco Use   • Smoking status: Never Smoker   • Smokeless tobacco: Never Used   Vaping Use   • Vaping Use: Never used   Substance and Sexual Activity   • Alcohol use: Yes     Alcohol/week: 2.0 standard drinks     Types: 2 Standard drinks or equivalent per week     Comment: only on weekends, none in 2 weeks   • Drug use: No   • Sexual activity: Defer       Tobacco History:   Social History     Tobacco Use   Smoking Status Never Smoker   Smokeless Tobacco Never Used       Medications:     Current Outpatient Medications:   •  atorvastatin (LIPITOR) 40 MG tablet, Take 1 tablet by mouth Daily., Disp: 90 tablet, Rfl: 3  •  benazepril-hydrochlorthiazide (LOTENSIN HCT) 5-6.25 MG per tablet, Take 1 tablet by mouth Daily., Disp: 30 tablet, Rfl: 11  •  glucose blood test strip, 1 strip by Subdermal route Daily., Disp: , Rfl:   •  Lancet Devices misc, 1 Piece by Subdermal route Daily., Disp: , Rfl:   •  metFORMIN ER (GLUCOPHAGE-XR) 500 MG 24 hr tablet, Take 2 tablets by mouth Daily With Breakfast & Dinner., Disp: 120 tablet, Rfl: 11  •  Hydrocortisone Ace-Pramoxine 1-1 % rectal cream, Insert  into the rectum 2 (Two) Times a  "Day As Needed for Hemorrhoids., Disp: 28.4 g, Rfl: 1    Allergies:   Allergies   Allergen Reactions   • Lisinopril Rash       Objective   Objective     Physical Exam:  Vital Signs:   Vitals:    01/03/22 1533   BP: 132/90   Pulse: 70   Resp: 16   SpO2: 97%   Weight: 97.9 kg (215 lb 12.8 oz)   Height: 172.7 cm (67.99\")     Body mass index is 32.82 kg/m².     Physical Exam  Nursing note reviewed  Const: NAD, A&Ox4, Pleasant, Cooperative  Eyes: EOMI, no conjunctivitis  ENT: No nasal discharge present, neck supple  Cardiac: Regular rate and rhythm, no cyanosis  Resp: Respiratory rate within normal limits, no increased work of breathing, no audible wheezing or retractions noted  GI: No distention or ascites  MSK: Motor and sensation grossly intact in bilateral upper extremities  Neurologic: CN II-XII grossly intact  Psych: Appropriate mood and behavior.  Skin: Warm, dry  Procedures/Radiology     Procedures  CT Abdomen Pelvis Without Contrast    Result Date: 12/28/2021   No acute abdominopelvic findings. Post surgical change of prior left adrenalectomy. Scattered punctate metallic densities throughout the colon are nonspecific and may reflect small clips and correlate with colonoscopy history.   This report was finalized on 12/28/2021 1:26 PM by Mo Kothari MD.         Assessment/Plan   Assessment / Plan      Assessment/Plan:   Problems Addressed This Visit  Diagnoses and all orders for this visit:    1. Hemorrhoids, unspecified hemorrhoid type (Primary)  -     Hydrocortisone Ace-Pramoxine 1-1 % rectal cream; Insert  into the rectum 2 (Two) Times a Day As Needed for Hemorrhoids.  Dispense: 28.4 g; Refill: 1    2. Uncontrolled type 2 diabetes mellitus with hyperglycemia (HCC)      Problem List Items Addressed This Visit        Endocrine and Metabolic    Uncontrolled type 2 diabetes mellitus with hyperglycemia (HCC)      Other Visit Diagnoses     Hemorrhoids, unspecified hemorrhoid type    -  Primary    Relevant " Medications    Hydrocortisone Ace-Pramoxine 1-1 % rectal cream          1. Hemorrhoids.  - I would like him to follow up with Dr. Peck. In the meantime, he can use Procto cream. He was advised he can apply one time this evening, and then start applying twice daily.     2. Uncontrolled diabetes mellitus.  - Follow up next week with Dr. Abbott.    Patient Instructions   1.  See Dr. Peck on 1/11    2.  See Dr. Abbott (diabetes) on 1/12/22 at 1:00PM      Follow Up:   Return in about 6 months (around 7/3/2022) for Annual.      MDM     Transcribed from ambient dictation for Miguel Angel Marquez DO by Glenis Alonso.  01/03/22   20:11 EST    Patient verbalized consent to the visit recording.  I have personally performed the services described in this document as transcribed by the above individual, and it is both accurate and complete.  Miguel Angel Marquez DO  1/4/2022  09:05 EST     [facility] [address]  [facility]  [address]  [facility] [address]  Fax [phone]  Phone [phone]

## 2022-01-12 ENCOUNTER — OFFICE VISIT (OUTPATIENT)
Dept: ENDOCRINOLOGY | Facility: CLINIC | Age: 52
End: 2022-01-12

## 2022-01-12 VITALS
WEIGHT: 218 LBS | OXYGEN SATURATION: 98 % | SYSTOLIC BLOOD PRESSURE: 120 MMHG | HEART RATE: 64 BPM | BODY MASS INDEX: 33.04 KG/M2 | DIASTOLIC BLOOD PRESSURE: 70 MMHG | HEIGHT: 68 IN

## 2022-01-12 DIAGNOSIS — E11.65 UNCONTROLLED TYPE 2 DIABETES MELLITUS WITH HYPERGLYCEMIA: Primary | ICD-10-CM

## 2022-01-12 DIAGNOSIS — Z86.018 H/O PHEOCHROMOCYTOMA: ICD-10-CM

## 2022-01-12 DIAGNOSIS — E78.2 MIXED HYPERLIPIDEMIA: ICD-10-CM

## 2022-01-12 DIAGNOSIS — I10 ESSENTIAL HYPERTENSION: ICD-10-CM

## 2022-01-12 LAB
EXPIRATION DATE: NORMAL
GLUCOSE BLDC GLUCOMTR-MCNC: 206 MG/DL (ref 70–130)
HBA1C MFR BLD: 10.9 %
Lab: NORMAL

## 2022-01-12 PROCEDURE — 99214 OFFICE O/P EST MOD 30 MIN: CPT | Performed by: INTERNAL MEDICINE

## 2022-01-12 PROCEDURE — 83036 HEMOGLOBIN GLYCOSYLATED A1C: CPT | Performed by: INTERNAL MEDICINE

## 2022-01-12 PROCEDURE — 82947 ASSAY GLUCOSE BLOOD QUANT: CPT | Performed by: INTERNAL MEDICINE

## 2022-01-12 RX ORDER — ATORVASTATIN CALCIUM 40 MG/1
40 TABLET, FILM COATED ORAL DAILY
Qty: 90 TABLET | Refills: 3 | Status: SHIPPED | OUTPATIENT
Start: 2022-01-12 | End: 2022-07-11 | Stop reason: SDUPTHER

## 2022-01-12 NOTE — PROGRESS NOTES
Chief Complaint   Patient presents with   • Diabetes     follow up        HPI:   Sandra Colon is a 51 y.o.male who returns to [facility] for f/u evaluation of Type 2 DM, hyperlipidemia, and h/o pheochromocytoma. Last visit 2021.  His history is as follows:    Interim Events:   - Had stopped all his medications and stopped dietary changes over the holidays. Restarted metformin ER 2 pills daily 2 weeks ago.   - did not restart his BP medication or atorvastatin  - Is scheduled for colonoscopy next Friday.     1) Type 2 diabetes with no known associated complications at this time:  - diagnosed in 2018 - A1C% 11.90. Prescribed 3 oral agents by his PCP at that time  - pt stopped his medications post-op in 10/2018  - I restarted the medications in 2018, but patient stopped the medications again  - reports the Janumet gave him headaches  - did not tolerate Synjardy  - restarted metformin in 2020    Current DM Medications:   Metformin  m tabs daily for past two weeks. 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 As part of his evaluation he had a colonoscopy and an ultrasound of the abdomen completed at [facility] on 17.  The ultrasound report had described a left kidney nodule that  "was later confirmed to be a left adrenal nodule on CT scan of the abdomen:   \"The right adrenal gland is normal. There is a low dense mass in the left adrenal gland measuring 3.0 x 4.3 cm. On the unenhanced image the adrenal mass has an attenuation coefficient of 28.5 Hounsfield units. The arterial phase demonstrates an attenuation coefficient of 33.2 Hounsfield units. The attenuation on venous imaging is 29.8 Hounsfield units and on delayed imaging 35.18 Hounsfield units. The mass demonstrates a thin rim of contrast enhancement primarily posteriorly.\"     See prior notes for biochemical evaluation prior to surgery.    - 10/22/2018:  pt underwent left adrenalectomy on 10/22/2018 by Dr. Dariusz Medrano. Intraoperative use of US with renal exploration was completed. No renal lesions were seen.     Surgical pathology: pheochromocytoma and nodular adrenocortical hyperplasia.   \"Microscopic Description: Sections reveal a neoplasm within the medullary area area of the adrenal gland composed of polygonal shaped cells with moderate to abundant granular faintly blue/purple cytoplasm. Intranuclear inclusions are noted. Other areas show more diffuse and solid arrangements of tumor cells. Nuclear hyperchromasia and pleomorphism is noted prominently. Scattered mitoses with up to 3 Immunohistochemical stains are performed and reported as follows: Chromogranin A +, GATA3 +, Calretinin (-) in cells of interest, Inhibin (-) in cells of interest, Melan-A  (-) in cells of interest. The overall findings ae those of pheochromocytoma. Additionally, there is a nodule composed of adrenocortical cells, c/w nodular adrenocortical hyperplasia.\"    Since last visit, no significant elevation in BP at home  Last lab: (03/2019) plasma metanephrines normal     3) essential HTN:   - pt had been off all antihypertensive medications post-operatively 10/2018  - In 08/2021, started benazepril/HCTZ 5-6.25 mg daily.  - pt has not been taking    4) mixed " "hyperlipidemia:   - was taking atorvastatin 40 mg daily. Has not been taking recently  - 2019 Abd US showed fatty liver    Review of Systems   Constitutional: Negative for fatigue.        Weight loss   HENT: Negative.    Eyes: Negative.  Negative for itching.   Respiratory: Negative.    Cardiovascular: Positive for leg swelling.   Gastrointestinal: Positive for abdominal pain (lower).   Endocrine: Negative for polyuria.   Genitourinary: Negative.  Negative for frequency.   Musculoskeletal: Negative.  Negative for back pain.   Skin: Negative.    Allergic/Immunologic: Negative.    Neurological: Negative for headaches.   Hematological: Negative.    Psychiatric/Behavioral: Negative.        The following portions of the patient's history were reviewed and updated as appropriate: allergies, current medications, past family history, past medical history, past social history, past surgical history and problem list.    Vitals:    01/12/22 1242   BP: 120/70   Pulse: 64   SpO2: 98%   Weight: 98.9 kg (218 lb)   Height: 172.7 cm (68\")     Physical Exam   Constitutional: He is oriented to person, place, and time. He appears well-developed. No distress.   overweight   HENT:   Head: Normocephalic.   Eyes: Pupils are equal, round, and reactive to light. Conjunctivae are normal.   Neck: No tracheal deviation present. No thyromegaly present.   No palpable thyroid nodules     Cardiovascular: Normal rate, regular rhythm and normal heart sounds.   No murmur heard.  Pulmonary/Chest: Effort normal and breath sounds normal. No respiratory distress.   Abdominal: Soft. Bowel sounds are normal. He exhibits no mass. There is no abdominal tenderness.   Surgical scar well healed   Lymphadenopathy:     He has no cervical adenopathy.   Neurological: He is alert and oriented to person, place, and time. No cranial nerve deficit.   Skin: Skin is warm and dry. He is not diaphoretic. No erythema.   Acanthosis nigricans   Psychiatric: His behavior is " normal.   Vitals reviewed.    LABS/IMAGING: outside records reviewed and summarized in HPI  Office Visit on 2022   Component Date Value Ref Range Status   • Glucose 2022 206* 70 - 130 mg/dL Final   • Hemoglobin A1C 2022 10.9  % Final   • Lot Number 2022 [id number]   Final   • Expiration Date 2022   Final       ASSESSMENT/PLAN:  1) Type 2 diabetes with no known associated complications at this time: uncontrolled with hyperglycemia, worsening control since last visit. A1C% 10.9 today up from 7.6% last visit.    Discussed option of adding another agent. Pt declined at this time and wants to focus on dietary changes and restarting his medications.    Restart metformin  m tabs BID with food    2) mixed hyperlipidemia:  - Restart atorvastatin 40 mg daily  - CMP, lipid panel completed 2021  elevated LFT's improving. H/o fatty liver.  Advised pt to decrease alcohol intake.     3) h/o left pheochromocytoma: no evidence of recurrence  - s/p resection 10/22/2018  - clinically doing well after surgery. HAs have resolved. BP has been WNL at home  - d/w with patient that he will need lifelong follow-up as pheochromocytomas can recur  - (2019) plasma metanephrines were normal      4) essential hypertension:  Based on pt's recent reports of lower extremity edema, will restart low dose benazepril/HCTZ 5-6.25 mg daily.     DM health maintenance labs completed 2021 reviewed    RTC 4 months      Signed: Roma Abbott MD

## 2022-07-11 ENCOUNTER — OFFICE VISIT (OUTPATIENT)
Dept: FAMILY MEDICINE CLINIC | Facility: CLINIC | Age: 52
End: 2022-07-11

## 2022-07-11 VITALS
HEIGHT: 68 IN | DIASTOLIC BLOOD PRESSURE: 84 MMHG | WEIGHT: 216.8 LBS | BODY MASS INDEX: 32.86 KG/M2 | HEART RATE: 65 BPM | SYSTOLIC BLOOD PRESSURE: 122 MMHG | OXYGEN SATURATION: 95 %

## 2022-07-11 DIAGNOSIS — E11.9 TYPE 2 DIABETES MELLITUS WITHOUT COMPLICATION, WITHOUT LONG-TERM CURRENT USE OF INSULIN: ICD-10-CM

## 2022-07-11 DIAGNOSIS — K43.2 INCISIONAL HERNIA, WITHOUT OBSTRUCTION OR GANGRENE: ICD-10-CM

## 2022-07-11 DIAGNOSIS — E78.2 MIXED HYPERLIPIDEMIA: ICD-10-CM

## 2022-07-11 DIAGNOSIS — I10 ESSENTIAL HYPERTENSION: ICD-10-CM

## 2022-07-11 DIAGNOSIS — E11.65 UNCONTROLLED TYPE 2 DIABETES MELLITUS WITH HYPERGLYCEMIA: Primary | ICD-10-CM

## 2022-07-11 LAB
EXPIRATION DATE: NORMAL
HBA1C MFR BLD: 8.2 %
Lab: NORMAL

## 2022-07-11 PROCEDURE — 83036 HEMOGLOBIN GLYCOSYLATED A1C: CPT | Performed by: FAMILY MEDICINE

## 2022-07-11 PROCEDURE — 99214 OFFICE O/P EST MOD 30 MIN: CPT | Performed by: FAMILY MEDICINE

## 2022-07-11 RX ORDER — BENAZEPRIL HYDROCHLORIDE AND HYDROCHLOROTHIAZIDE 5; 6.25 MG/1; MG/1
1 TABLET ORAL DAILY
Qty: 90 TABLET | Refills: 3 | Status: SHIPPED | OUTPATIENT
Start: 2022-07-11

## 2022-07-11 RX ORDER — METFORMIN HYDROCHLORIDE 500 MG/1
1000 TABLET, EXTENDED RELEASE ORAL
Qty: 360 TABLET | Refills: 3 | Status: SHIPPED | OUTPATIENT
Start: 2022-07-11

## 2022-07-11 RX ORDER — ATORVASTATIN CALCIUM 40 MG/1
40 TABLET, FILM COATED ORAL DAILY
Qty: 90 TABLET | Refills: 3 | Status: SHIPPED | OUTPATIENT
Start: 2022-07-11

## 2022-07-11 NOTE — PROGRESS NOTES
[facility]    Subjective   Sandra Colon is a 52 y.o. male who presents to the office today for   Chief Complaint   Patient presents with   • Hemorrhoids, unspecified hemorrhoid type   • Abdominal Pain     Swelling on left side of abdomin, moves around to back painful and worse when swells     • Joint Swelling     Left knee       History of Present Illness     The patient presents today for a 6-month follow-up. At his visit in 01/2022, he was struggling with hemorrhoids. I prescribed him Proctocream, but advised him to follow up with his colorectal specialist, Dr. Jackson. He has uncontrolled diabetes and previously saw Dr. Abbott in 2018, but stopped taking his medications and following up with his endocrinologist in 2021. His A1c in 01/2022 was 10.9 percent. I have asked him again to follow up with his endocrinologist and to take his medications as instructed. He was supposed to have a follow-up with Dr. Abbott in 05/2022, but it looks like cancelled this.    The patient reports that he missed his last appointment with Dr. Abbott. He states that he has another appointment scheduled. He reports that his A1c was 10.9 percent on 01/20/2011, and it went down to 6 percent when he was on the medications, and then it went up to 8 percent. He states that since then, he missed this last appointment, but it was last time. He reports that he has used his meter, and all of his blood sugar readings are 160 to 130 mg/dL. He states that he is still taking 2 pills of metformin every day. He reports that he did see Dr. Jackson  about his hemorrhoids, and he gave him some medication, and it is fine. He states that he just got the metformin refills 2 to 3 days ago.    He reports that he is not taking his blood pressure medication, but he thinks he needs it. He reports that he forgot to take it. He reports that he was taking the cholesterol medication, but not very often.    the patient states that he is  having pain in his abdomen. He reports that he told the doctor that I sent him for the MRI. He states that it comes on big, and it hurts, and then it goes down. He reports that it is still hurting a little bit. He reports that he feels a little pain constantly. He states having swelling. He reports that it has been like that since they did the surgery. He states that he had a left adrenal gland removed in 10/2018 at [facility]. He reports that he needs to lose more weight. He states his blood sugar readings go down to 110 to 100 mg/dL when exercising. He reports that he still has some Pepsi.    The patient states having swelling in his left knee all the time. He reports that he had a surgery on it before. He states that they scraped it, and put  3 injections in before they did it because it was painful. He reports that he could not move or sleep. He states that they did injections every 2 months before having the knee procedure. He reports that the doctor told him that if the injection did not help, then they go in and clean it up.    This patient is accompanied by their self who contributes to the history of their care.    The following portions of the patient's history were reviewed and updated as appropriate: allergies, current medications, past medical history, past surgical history and problem list.    Current Outpatient Medications   Medication Instructions   • atorvastatin (LIPITOR) 40 mg, Oral, Daily   • benazepril-hydrochlorthiazide (LOTENSIN HCT) 5-6.25 MG per tablet 1 tablet, Oral, Daily   • glucose blood test strip 1 strip, Subdermal, Daily   • Hydrocortisone Ace-Pramoxine 1-1 % rectal cream Rectal, 2 Times Daily PRN   • Lancet Devices misc 1 Piece, Subdermal, Daily   • metFORMIN ER (GLUCOPHAGE-XR) 1,000 mg, Oral, Daily With Breakfast & Dinner     Active Ambulatory Problems     Diagnosis Date Noted   • Fatty liver    • Mixed hyperlipidemia    • H/O pheochromocytoma 10/15/2017   • Uncontrolled  "type 2 diabetes mellitus with hyperglycemia (HCC) 06/11/2018   • Essential hypertension 09/09/2021     Resolved Ambulatory Problems     Diagnosis Date Noted   • Renal mass 06/11/2018   • Gross hematuria 06/11/2018     Past Medical History:   Diagnosis Date   • Adrenal tumor    • Diabetes mellitus (HCC)    • Hyperlipidemia    • Hypertension    • Kidney tumor      Past Surgical History:   Procedure Laterality Date   • ADRENALECTOMY Left 2018   • CYST REMOVAL Left     on wrist   • KNEE SURGERY Left      Family History   Problem Relation Age of Onset   • No Known Problems Mother    • Diabetes Father    • No Known Problems Sister    • No Known Problems Brother    • Cancer Paternal Grandmother    • Diabetes Paternal Grandfather      Social History     Socioeconomic History   • Marital status:    Tobacco Use   • Smoking status: Never Smoker   • Smokeless tobacco: Never Used   Vaping Use   • Vaping Use: Never used   Substance and Sexual Activity   • Alcohol use: Yes     Alcohol/week: 2.0 standard drinks     Types: 2 Standard drinks or equivalent per week     Comment: only on weekends, none in 2 weeks   • Drug use: No   • Sexual activity: Defer       Review of Systems    Objective   Blood pressure 122/84, pulse 65, height 172.7 cm (67.99\"), weight 98.3 kg (216 lb 12.8 oz), SpO2 95 %.  Nursing note reviewed  Physical Exam  Const: NAD, A&Ox4, Pleasant, Cooperative  Eyes: EOMI, no conjunctivitis  ENT: No nasal discharge present, neck supple  Cardiac: Regular rate and rhythm, no cyanosis  Resp: Respiratory rate within normal limits, no increased work of breathing, no audible wheezing or retractions noted  GI: No distention or ascites  MSK: Motor and sensation grossly intact in bilateral upper extremities  Neurologic: CN II-XII grossly intact  Psych: Appropriate mood and behavior.  Skin: Warm, dry  Procedures  No radiology results for the last day   Assessment & Plan   Problem List Items Addressed This Visit        Cardiac " and Vasculature    Mixed hyperlipidemia (Chronic)    Overview     Moderate compliance with atorvastatin. Goal LDL <70           Current Assessment & Plan     Cont atorvastatin 40mg, encouraged daily compliance           Relevant Medications    atorvastatin (LIPITOR) 40 MG tablet    Essential hypertension (Chronic)    Current Assessment & Plan     Lotensin 5-6.25mg           Relevant Medications    benazepril-hydrochlorthiazide (LOTENSIN HCT) 5-6.25 MG per tablet       Endocrine and Metabolic    Uncontrolled type 2 diabetes mellitus with hyperglycemia (HCC) - Primary    Relevant Medications    metFORMIN ER (GLUCOPHAGE-XR) 500 MG 24 hr tablet      Other Visit Diagnoses     Type 2 diabetes mellitus without complication, without long-term current use of insulin (HCC)        Relevant Medications    metFORMIN ER (GLUCOPHAGE-XR) 500 MG 24 hr tablet    Other Relevant Orders    POC Glycosylated Hemoglobin (Hb A1C) (Completed)    Incisional hernia, without obstruction or gangrene        Painful when swells. CT December 2021 ok. Same side as pheochromocytoma          See patient diagnoses and orders along with patient instructions for assessment, plan, and changes to care for patient. All point of care testing and imaging was reviewed personally by me during the patient encounter. Diagnosis, including relevant anatomy, expected clinical course, and plans for follow-up were reviewed with the patient. Any prescriptions have been sent to the patient's desired pharmacy on file.    Progress note signed and attested to by Miguel Angel Marquez D.O.      Transcribed from ambient dictation for Miguel Angel Marquez DO by IZZY JOHANSEN.  07/11/22   16:58 EDT    Patient verbalized consent to the visit recording.

## 2023-01-04 ENCOUNTER — HOSPITAL ENCOUNTER (EMERGENCY)
Facility: HOSPITAL | Age: 53
Discharge: HOME | End: 2023-01-04
Attending: EMERGENCY MEDICINE | Admitting: EMERGENCY MEDICINE
Payer: COMMERCIAL

## 2023-01-04 VITALS
BODY MASS INDEX: 32.58 KG/M2 | TEMPERATURE: 98 F | HEIGHT: 68 IN | HEART RATE: 58 BPM | WEIGHT: 215 LBS | OXYGEN SATURATION: 98 % | SYSTOLIC BLOOD PRESSURE: 127 MMHG | DIASTOLIC BLOOD PRESSURE: 99 MMHG | RESPIRATION RATE: 18 BRPM

## 2023-01-04 DIAGNOSIS — R73.9 HYPERGLYCEMIA: Primary | ICD-10-CM

## 2023-01-04 DIAGNOSIS — Z86.39 HISTORY OF DIABETES MELLITUS: ICD-10-CM

## 2023-01-04 LAB
ALBUMIN SERPL-MCNC: 4.2 G/DL (ref 3.5–5.2)
ALBUMIN/GLOB SERPL: 1.5 G/DL
ALP SERPL-CCNC: 113 U/L (ref 39–117)
ALT SERPL W P-5'-P-CCNC: 25 U/L (ref 1–41)
ANION GAP SERPL CALCULATED.3IONS-SCNC: 11 MMOL/L (ref 5–15)
ARTERIAL PATENCY WRIST A: ABNORMAL
AST SERPL-CCNC: 20 U/L (ref 1–40)
ATMOSPHERIC PRESS: ABNORMAL MM[HG]
BACTERIA UR QL AUTO: NORMAL /HPF
BASE EXCESS BLDA CALC-SCNC: 1.7 MMOL/L (ref 0–2)
BASOPHILS # BLD AUTO: 0.02 10*3/MM3 (ref 0–0.2)
BASOPHILS NFR BLD AUTO: 0.4 % (ref 0–1.5)
BDY SITE: ABNORMAL
BILIRUB SERPL-MCNC: 0.6 MG/DL (ref 0–1.2)
BILIRUB UR QL STRIP: NEGATIVE
BODY TEMPERATURE: 37 C
BUN SERPL-MCNC: 9 MG/DL (ref 6–20)
BUN/CREAT SERPL: 11.8 (ref 7–25)
CALCIUM SPEC-SCNC: 8.8 MG/DL (ref 8.6–10.5)
CHLORIDE SERPL-SCNC: 97 MMOL/L (ref 98–107)
CLARITY UR: ABNORMAL
CO2 BLDA-SCNC: 29.1 MMOL/L (ref 22–33)
CO2 SERPL-SCNC: 26 MMOL/L (ref 22–29)
COHGB MFR BLD: 1 % (ref 0–2)
COLOR UR: YELLOW
CREAT SERPL-MCNC: 0.76 MG/DL (ref 0.76–1.27)
DEPRECATED RDW RBC AUTO: 41.6 FL (ref 37–54)
EGFRCR SERPLBLD CKD-EPI 2021: 107.5 ML/MIN/1.73
EOSINOPHIL # BLD AUTO: 0.1 10*3/MM3 (ref 0–0.4)
EOSINOPHIL NFR BLD AUTO: 2.1 % (ref 0.3–6.2)
EPAP: 0
ERYTHROCYTE [DISTWIDTH] IN BLOOD BY AUTOMATED COUNT: 12.6 % (ref 12.3–15.4)
GLOBULIN UR ELPH-MCNC: 2.8 GM/DL
GLUCOSE BLDC GLUCOMTR-MCNC: 285 MG/DL (ref 70–130)
GLUCOSE SERPL-MCNC: 277 MG/DL (ref 65–99)
GLUCOSE UR STRIP-MCNC: ABNORMAL MG/DL
HBA1C MFR BLD: 11.5 % (ref 4.8–5.6)
HCO3 BLDA-SCNC: 27.7 MMOL/L (ref 20–26)
HCT VFR BLD AUTO: 40.8 % (ref 37.5–51)
HCT VFR BLD CALC: 41.4 % (ref 38–51)
HGB BLD-MCNC: 14.5 G/DL (ref 13–17.7)
HGB BLDA-MCNC: 13.5 G/DL (ref 13.5–17.5)
HGB UR QL STRIP.AUTO: NEGATIVE
HOLD SPECIMEN: NORMAL
HYALINE CASTS UR QL AUTO: NORMAL /LPF
IMM GRANULOCYTES # BLD AUTO: 0.01 10*3/MM3 (ref 0–0.05)
IMM GRANULOCYTES NFR BLD AUTO: 0.2 % (ref 0–0.5)
INHALED O2 CONCENTRATION: 21 %
IPAP: 0
KETONES UR QL STRIP: ABNORMAL
LEUKOCYTE ESTERASE UR QL STRIP.AUTO: NEGATIVE
LIPASE SERPL-CCNC: 25 U/L (ref 13–60)
LYMPHOCYTES # BLD AUTO: 1.56 10*3/MM3 (ref 0.7–3.1)
LYMPHOCYTES NFR BLD AUTO: 33.1 % (ref 19.6–45.3)
MAGNESIUM SERPL-MCNC: 1.8 MG/DL (ref 1.6–2.6)
MCH RBC QN AUTO: 32.4 PG (ref 26.6–33)
MCHC RBC AUTO-ENTMCNC: 35.5 G/DL (ref 31.5–35.7)
MCV RBC AUTO: 91.1 FL (ref 79–97)
METHGB BLD QL: 0.5 % (ref 0–1.5)
MODALITY: ABNORMAL
MONOCYTES # BLD AUTO: 0.46 10*3/MM3 (ref 0.1–0.9)
MONOCYTES NFR BLD AUTO: 9.8 % (ref 5–12)
NEUTROPHILS NFR BLD AUTO: 2.56 10*3/MM3 (ref 1.7–7)
NEUTROPHILS NFR BLD AUTO: 54.4 % (ref 42.7–76)
NITRITE UR QL STRIP: NEGATIVE
NOTE: ABNORMAL
NRBC BLD AUTO-RTO: 0 /100 WBC (ref 0–0.2)
OXYHGB MFR BLDV: 61.2 % (ref 94–99)
PAW @ PEAK INSP FLOW SETTING VENT: 0 CMH2O
PCO2 BLDA: 47.5 MM HG (ref 35–45)
PCO2 TEMP ADJ BLD: 47.5 MM HG (ref 35–48)
PH BLDA: 7.37 PH UNITS (ref 7.35–7.45)
PH UR STRIP.AUTO: 5.5 [PH] (ref 5–8)
PH, TEMP CORRECTED: 7.37 PH UNITS
PLATELET # BLD AUTO: 196 10*3/MM3 (ref 140–450)
PMV BLD AUTO: 11.4 FL (ref 6–12)
PO2 BLDA: 33 MM HG (ref 83–108)
PO2 TEMP ADJ BLD: 33 MM HG (ref 83–108)
POTASSIUM SERPL-SCNC: 4.2 MMOL/L (ref 3.5–5.2)
PROT SERPL-MCNC: 7 G/DL (ref 6–8.5)
PROT UR QL STRIP: NEGATIVE
RBC # BLD AUTO: 4.48 10*6/MM3 (ref 4.14–5.8)
RBC # UR STRIP: NORMAL /HPF
REF LAB TEST METHOD: NORMAL
SODIUM SERPL-SCNC: 134 MMOL/L (ref 136–145)
SP GR UR STRIP: 1.04 (ref 1–1.03)
SQUAMOUS #/AREA URNS HPF: NORMAL /HPF
TOTAL RATE: 0 BREATHS/MINUTE
UROBILINOGEN UR QL STRIP: ABNORMAL
WBC # UR STRIP: NORMAL /HPF
WBC NRBC COR # BLD: 4.71 10*3/MM3 (ref 3.4–10.8)
WHOLE BLOOD HOLD COAG: NORMAL
WHOLE BLOOD HOLD SPECIMEN: NORMAL

## 2023-01-04 PROCEDURE — 85025 COMPLETE CBC W/AUTO DIFF WBC: CPT | Performed by: EMERGENCY MEDICINE

## 2023-01-04 PROCEDURE — 83690 ASSAY OF LIPASE: CPT | Performed by: EMERGENCY MEDICINE

## 2023-01-04 PROCEDURE — 83735 ASSAY OF MAGNESIUM: CPT | Performed by: EMERGENCY MEDICINE

## 2023-01-04 PROCEDURE — 82805 BLOOD GASES W/O2 SATURATION: CPT

## 2023-01-04 PROCEDURE — 82962 GLUCOSE BLOOD TEST: CPT

## 2023-01-04 PROCEDURE — 83036 HEMOGLOBIN GLYCOSYLATED A1C: CPT | Performed by: EMERGENCY MEDICINE

## 2023-01-04 PROCEDURE — 99284 EMERGENCY DEPT VISIT MOD MDM: CPT

## 2023-01-04 PROCEDURE — 82375 ASSAY CARBOXYHB QUANT: CPT

## 2023-01-04 PROCEDURE — 81001 URINALYSIS AUTO W/SCOPE: CPT | Performed by: EMERGENCY MEDICINE

## 2023-01-04 PROCEDURE — 80053 COMPREHEN METABOLIC PANEL: CPT | Performed by: EMERGENCY MEDICINE

## 2023-01-04 PROCEDURE — 36600 WITHDRAWAL OF ARTERIAL BLOOD: CPT

## 2023-01-04 PROCEDURE — 83050 HGB METHEMOGLOBIN QUAN: CPT

## 2023-01-04 RX ORDER — SODIUM CHLORIDE 0.9 % (FLUSH) 0.9 %
10 SYRINGE (ML) INJECTION AS NEEDED
Status: DISCONTINUED | OUTPATIENT
Start: 2023-01-04 | End: 2023-01-04 | Stop reason: HOSPADM

## 2023-01-04 RX ADMIN — SODIUM CHLORIDE, POTASSIUM CHLORIDE, SODIUM LACTATE AND CALCIUM CHLORIDE 1000 ML: 600; 310; 30; 20 INJECTION, SOLUTION INTRAVENOUS at 13:33

## 2023-01-04 RX ADMIN — SODIUM CHLORIDE, POTASSIUM CHLORIDE, SODIUM LACTATE AND CALCIUM CHLORIDE 1000 ML: 600; 310; 30; 20 INJECTION, SOLUTION INTRAVENOUS at 11:39

## 2023-01-04 NOTE — ED PROVIDER NOTES
Subjective   History of Present Illness    52-year-old male presents for evaluation of elevated blood sugar readings.  Of note, the patient has a history of diabetes.  He is on oral medications without insulin.  He states that for the past 6 weeks or so his blood sugars have been \"spiked.\"  He is unsure as what may have caused this and denies any significant changes to his diet.  Yesterday his blood sugar was as high as nearly 600, prompting his visit to the emergency department this morning.  He endorses intermittent paresthesias to his feet over the past several weeks.  He endorses polyuria when compared to baseline.  He states that he has been a bit more fatigued than normal as well.        Review of Systems   Constitutional: Positive for fatigue.   Endocrine: Positive for polyuria.   Neurological: Positive for numbness.   All other systems reviewed and are negative.      Past Medical History:   Diagnosis Date   • Adrenal tumor    • Diabetes mellitus (HCC)    • Fatty liver    • Hyperlipidemia    • Hypertension    • Kidney tumor        Allergies   Allergen Reactions   • Lisinopril Rash       Past Surgical History:   Procedure Laterality Date   • ADRENALECTOMY Left 2018   • CYST REMOVAL Left     on wrist   • KNEE SURGERY Left        Family History   Problem Relation Age of Onset   • No Known Problems Mother    • Diabetes Father    • No Known Problems Sister    • No Known Problems Brother    • Cancer Paternal Grandmother    • Diabetes Paternal Grandfather        Social History     Socioeconomic History   • Marital status:    Tobacco Use   • Smoking status: Never   • Smokeless tobacco: Never   Vaping Use   • Vaping Use: Never used   Substance and Sexual Activity   • Alcohol use: Yes     Alcohol/week: 2.0 standard drinks     Types: 2 Standard drinks or equivalent per week     Comment: only on weekends, none in 2 weeks   • Drug use: No   • Sexual activity: Defer           Objective   Physical Exam  Vitals and  nursing note reviewed.   Constitutional:       General: He is not in acute distress.     Appearance: Normal appearance. He is well-developed. He is not diaphoretic.      Comments: Nontoxic-appearing male   HENT:      Head: Normocephalic and atraumatic.   Neck:      Vascular: No JVD.   Cardiovascular:      Rate and Rhythm: Normal rate and regular rhythm.      Heart sounds: Normal heart sounds. No murmur heard.    No friction rub. No gallop.   Pulmonary:      Effort: Pulmonary effort is normal. No respiratory distress.      Breath sounds: Normal breath sounds. No wheezing or rales.   Abdominal:      General: Bowel sounds are normal. There is no distension.      Palpations: Abdomen is soft. There is no mass.      Tenderness: There is no abdominal tenderness. There is no guarding.      Comments: No focal abdominal tenderness, no peritoneal signs, no pain out of proportion to exam   Musculoskeletal:         General: Normal range of motion.   Skin:     General: Skin is warm and dry.      Coloration: Skin is not pale.      Findings: No erythema or rash.   Neurological:      Mental Status: He is alert and oriented to person, place, and time.      Comments: Awake, alert, and oriented x3, clear and fluent speech, no ataxia or dysmetria, normal gait, neurovascularly intact distally in all fours with bounding distal pulses and normal sensation, 5 out of 5 strength in all fours, no cranial nerve deficits noted with cranial nerves II through XII grossly intact   Psychiatric:         Thought Content: Thought content normal.         Judgment: Judgment normal.         Procedures           ED Course  ED Course as of 01/04/23 1621 Wed Jan 04, 2023   1019 52-year-old male presents for evaluation of elevated blood sugar readings.  Of note, the patient has a history of diabetes.  He is on oral medications without insulin.  He notes that for the past 6 weeks or so his blood sugars have \"spiked.\"  He is unsure as to what may have caused  this.  Yesterday his blood sugar was as high as nearly 600, prompting his visit to the emergency department this morning.  On arrival, the patient is nontoxic-appearing.  Nonsurgical abdomen.  Exam is otherwise benign.  We will obtain labs, give IV fluids, and we will reassess following initial interventions. [DD]   1230 Labs remarkable for hyperglycemia without evidence of DKA.  IV fluids given. [DD]   1434 Upon reevaluation, the patient looks and feels well.  Patient reassured.  Encouraged continued compliance with his oral diabetes medication regimen.  He will continue to closely monitor his blood glucose at home and will follow-up with his primary care physician within the next week as he may need insulin in the near future.  I have referred him to endocrinology as well.  Agreeable with plan and given appropriate strict return precautions. [DD]      ED Course User Index  [DD] Андрей Patel MD                                       Recent Results (from the past 24 hour(s))   Urinalysis With Microscopic If Indicated (No Culture) - Urine, Clean Catch    Collection Time: 01/04/23 11:41 AM    Specimen: Urine, Clean Catch   Result Value Ref Range    Color, UA Yellow Yellow, Straw    Appearance, UA Cloudy (A) Clear    pH, UA 5.5 5.0 - 8.0    Specific Gravity, UA 1.038 (H) 1.001 - 1.030    Glucose, UA >=1000 mg/dL (3+) (A) Negative    Ketones, UA Trace (A) Negative    Bilirubin, UA Negative Negative    Blood, UA Negative Negative    Protein, UA Negative Negative    Leuk Esterase, UA Negative Negative    Nitrite, UA Negative Negative    Urobilinogen, UA 1.0 E.U./dL 0.2 - 1.0 E.U./dL   POC Glucose Once    Collection Time: 01/04/23 11:41 AM    Specimen: Blood   Result Value Ref Range    Glucose 285 (H) 70 - 130 mg/dL   Urinalysis, Microscopic Only - Urine, Clean Catch    Collection Time: 01/04/23 11:41 AM    Specimen: Urine, Clean Catch   Result Value Ref Range    RBC, UA 0-2 None Seen, 0-2 /HPF    WBC, UA 0-2 None  Seen, 0-2 /HPF    Bacteria, UA Trace None Seen, Trace /HPF    Squamous Epithelial Cells, UA 0-2 None Seen, 0-2 /HPF    Hyaline Casts, UA 0-6 0 - 6 /LPF    Methodology Manual Light Microscopy    Comprehensive Metabolic Panel    Collection Time: 01/04/23 11:42 AM    Specimen: Blood   Result Value Ref Range    Glucose 277 (H) 65 - 99 mg/dL    BUN 9 6 - 20 mg/dL    Creatinine 0.76 0.76 - 1.27 mg/dL    Sodium 134 (L) 136 - 145 mmol/L    Potassium 4.2 3.5 - 5.2 mmol/L    Chloride 97 (L) 98 - 107 mmol/L    CO2 26.0 22.0 - 29.0 mmol/L    Calcium 8.8 8.6 - 10.5 mg/dL    Total Protein 7.0 6.0 - 8.5 g/dL    Albumin 4.2 3.5 - 5.2 g/dL    ALT (SGPT) 25 1 - 41 U/L    AST (SGOT) 20 1 - 40 U/L    Alkaline Phosphatase 113 39 - 117 U/L    Total Bilirubin 0.6 0.0 - 1.2 mg/dL    Globulin 2.8 gm/dL    A/G Ratio 1.5 g/dL    BUN/Creatinine Ratio 11.8 7.0 - 25.0    Anion Gap 11.0 5.0 - 15.0 mmol/L    eGFR 107.5 >60.0 mL/min/1.73   Green Top (Gel)    Collection Time: 01/04/23 11:42 AM   Result Value Ref Range    Extra Tube Hold for add-ons.    Lavender Top    Collection Time: 01/04/23 11:42 AM   Result Value Ref Range    Extra Tube hold for add-on    Gold Top - SST    Collection Time: 01/04/23 11:42 AM   Result Value Ref Range    Extra Tube Hold for add-ons.    Gray Top    Collection Time: 01/04/23 11:42 AM   Result Value Ref Range    Extra Tube Hold for add-ons.    Light Blue Top    Collection Time: 01/04/23 11:42 AM   Result Value Ref Range    Extra Tube Hold for add-ons.    CBC Auto Differential    Collection Time: 01/04/23 11:42 AM    Specimen: Blood   Result Value Ref Range    WBC 4.71 3.40 - 10.80 10*3/mm3    RBC 4.48 4.14 - 5.80 10*6/mm3    Hemoglobin 14.5 13.0 - 17.7 g/dL    Hematocrit 40.8 37.5 - 51.0 %    MCV 91.1 79.0 - 97.0 fL    MCH 32.4 26.6 - 33.0 pg    MCHC 35.5 31.5 - 35.7 g/dL    RDW 12.6 12.3 - 15.4 %    RDW-SD 41.6 37.0 - 54.0 fl    MPV 11.4 6.0 - 12.0 fL    Platelets 196 140 - 450 10*3/mm3    Neutrophil % 54.4 42.7 -  76.0 %    Lymphocyte % 33.1 19.6 - 45.3 %    Monocyte % 9.8 5.0 - 12.0 %    Eosinophil % 2.1 0.3 - 6.2 %    Basophil % 0.4 0.0 - 1.5 %    Immature Grans % 0.2 0.0 - 0.5 %    Neutrophils, Absolute 2.56 1.70 - 7.00 10*3/mm3    Lymphocytes, Absolute 1.56 0.70 - 3.10 10*3/mm3    Monocytes, Absolute 0.46 0.10 - 0.90 10*3/mm3    Eosinophils, Absolute 0.10 0.00 - 0.40 10*3/mm3    Basophils, Absolute 0.02 0.00 - 0.20 10*3/mm3    Immature Grans, Absolute 0.01 0.00 - 0.05 10*3/mm3    nRBC 0.0 0.0 - 0.2 /100 WBC   Lipase    Collection Time: 01/04/23 11:42 AM    Specimen: Blood   Result Value Ref Range    Lipase 25 13 - 60 U/L   Magnesium    Collection Time: 01/04/23 11:42 AM    Specimen: Blood   Result Value Ref Range    Magnesium 1.8 1.6 - 2.6 mg/dL   Hemoglobin A1c    Collection Time: 01/04/23 11:42 AM    Specimen: Blood   Result Value Ref Range    Hemoglobin A1C 11.50 (H) 4.80 - 5.60 %   Blood Gas, Arterial With Co-Ox    Collection Time: 01/04/23  2:27 PM    Specimen: Arterial Blood   Result Value Ref Range    Site Right Radial     Perez's Test N/A     pH, Arterial 7.373 7.350 - 7.450 pH units    pCO2, Arterial 47.5 (H) 35.0 - 45.0 mm Hg    pO2, Arterial 33.0 (C) 83.0 - 108.0 mm Hg    HCO3, Arterial 27.7 (H) 20.0 - 26.0 mmol/L    Base Excess, Arterial 1.7 0.0 - 2.0 mmol/L    Hemoglobin, Blood Gas 13.5 13.5 - 17.5 g/dL    Hematocrit, Blood Gas 41.4 38.0 - 51.0 %    Oxyhemoglobin 61.2 (L) 94 - 99 %    Methemoglobin 0.50 0.00 - 1.50 %    Carboxyhemoglobin 1.0 0 - 2 %    CO2 Content 29.1 22 - 33 mmol/L    Temperature 37.0 C    Barometric Pressure for Blood Gas      Modality Room Air     FIO2 21 %    Rate 0 Breaths/minute    PIP 0 cmH2O    IPAP 0     EPAP 0     Note      pH, Temp Corrected 7.373 pH Units    pCO2, Temperature Corrected 47.5 35 - 48 mm Hg    pO2, Temperature Corrected 33.0 (L) 83 - 108 mm Hg     Note: In addition to lab results from this visit, the labs listed above may include labs taken at another facility or  during a different encounter within the last 24 hours. Please correlate lab times with ED admission and discharge times for further clarification of the services performed during this visit.    No orders to display     Vitals:    01/04/23 1002 01/04/23 1003 01/04/23 1232 01/04/23 1330   BP:  159/98 151/97 127/99   Pulse: 66  52 58   Resp: 18      Temp: 98 °F (36.7 °C)      SpO2: 99%  99% 98%   Weight: 97.5 kg (215 lb)      Height: 172.7 cm (68\")        Medications   sodium chloride 0.9 % flush 10 mL (has no administration in time range)   sodium chloride 0.9 % flush 10 mL (has no administration in time range)   lactated ringers bolus 1,000 mL (0 mL Intravenous Stopped 1/4/23 1336)   lactated ringers bolus 1,000 mL (0 mL Intravenous Stopped 1/4/23 1425)     ECG/EMG Results (last 24 hours)     ** No results found for the last 24 hours. **        No orders to display              MDM    Final diagnoses:   Hyperglycemia   History of diabetes mellitus       ED Disposition  ED Disposition     ED Disposition   Discharge    Condition   Stable    Comment   --             Miguel Angel Marquez DO  [address] [facility] [id number] [phone]    In 1 week      Sharon Humphrey DO  [address]  [facility] [id number] [phone]      As needed         Medication List      No changes were made to your prescriptions during this visit.          Андрей Patel MD  01/04/23 8117

## 2023-01-17 ENCOUNTER — OFFICE VISIT (OUTPATIENT)
Dept: FAMILY MEDICINE CLINIC | Facility: CLINIC | Age: 53
End: 2023-01-17
Payer: COMMERCIAL

## 2023-01-17 ENCOUNTER — LAB (OUTPATIENT)
Dept: LAB | Facility: HOSPITAL | Age: 53
End: 2023-01-17
Payer: COMMERCIAL

## 2023-01-17 VITALS
SYSTOLIC BLOOD PRESSURE: 110 MMHG | TEMPERATURE: 97.7 F | DIASTOLIC BLOOD PRESSURE: 68 MMHG | WEIGHT: 210.4 LBS | OXYGEN SATURATION: 97 % | HEART RATE: 104 BPM | HEIGHT: 69 IN | BODY MASS INDEX: 31.16 KG/M2

## 2023-01-17 DIAGNOSIS — N48.29 FORESKIN INFLAMMATION: ICD-10-CM

## 2023-01-17 DIAGNOSIS — Z00.00 WELL ADULT EXAM: ICD-10-CM

## 2023-01-17 DIAGNOSIS — E11.65 UNCONTROLLED TYPE 2 DIABETES MELLITUS WITH HYPERGLYCEMIA: Primary | ICD-10-CM

## 2023-01-17 DIAGNOSIS — E78.2 MIXED HYPERLIPIDEMIA: Chronic | ICD-10-CM

## 2023-01-17 DIAGNOSIS — E11.65 UNCONTROLLED TYPE 2 DIABETES MELLITUS WITH HYPERGLYCEMIA: ICD-10-CM

## 2023-01-17 DIAGNOSIS — I10 ESSENTIAL HYPERTENSION: Chronic | ICD-10-CM

## 2023-01-17 LAB
ALBUMIN SERPL-MCNC: 4.6 G/DL (ref 3.5–5.2)
ALBUMIN UR-MCNC: <1.2 MG/DL
ALBUMIN/GLOB SERPL: 1.8 G/DL
ALP SERPL-CCNC: 117 U/L (ref 39–117)
ALT SERPL W P-5'-P-CCNC: 23 U/L (ref 1–41)
ANION GAP SERPL CALCULATED.3IONS-SCNC: 8 MMOL/L (ref 5–15)
AST SERPL-CCNC: 15 U/L (ref 1–40)
BILIRUB SERPL-MCNC: 0.6 MG/DL (ref 0–1.2)
BILIRUB UR QL STRIP: NEGATIVE
BUN SERPL-MCNC: 14 MG/DL (ref 6–20)
BUN/CREAT SERPL: 18.7 (ref 7–25)
CALCIUM SPEC-SCNC: 9.1 MG/DL (ref 8.6–10.5)
CHLORIDE SERPL-SCNC: 100 MMOL/L (ref 98–107)
CHOLEST SERPL-MCNC: 133 MG/DL (ref 0–200)
CLARITY UR: CLEAR
CO2 SERPL-SCNC: 28 MMOL/L (ref 22–29)
COLOR UR: YELLOW
CREAT SERPL-MCNC: 0.75 MG/DL (ref 0.76–1.27)
CREAT UR-MCNC: 161.9 MG/DL
DEPRECATED RDW RBC AUTO: 45 FL (ref 37–54)
EGFRCR SERPLBLD CKD-EPI 2021: 107.9 ML/MIN/1.73
ERYTHROCYTE [DISTWIDTH] IN BLOOD BY AUTOMATED COUNT: 12.9 % (ref 12.3–15.4)
EXPIRATION DATE: NORMAL
GLOBULIN UR ELPH-MCNC: 2.6 GM/DL
GLUCOSE SERPL-MCNC: 229 MG/DL (ref 65–99)
GLUCOSE UR STRIP-MCNC: ABNORMAL MG/DL
HBA1C MFR BLD: 10.9 %
HCT VFR BLD AUTO: 44.8 % (ref 37.5–51)
HDLC SERPL-MCNC: 32 MG/DL (ref 40–60)
HGB BLD-MCNC: 14.9 G/DL (ref 13–17.7)
HGB UR QL STRIP.AUTO: NEGATIVE
KETONES UR QL STRIP: ABNORMAL
LDLC SERPL CALC-MCNC: 81 MG/DL (ref 0–100)
LDLC/HDLC SERPL: 2.47 {RATIO}
LEUKOCYTE ESTERASE UR QL STRIP.AUTO: NEGATIVE
Lab: NORMAL
MCH RBC QN AUTO: 31.6 PG (ref 26.6–33)
MCHC RBC AUTO-ENTMCNC: 33.3 G/DL (ref 31.5–35.7)
MCV RBC AUTO: 95.1 FL (ref 79–97)
MICROALBUMIN/CREAT UR: NORMAL MG/G{CREAT}
NITRITE UR QL STRIP: NEGATIVE
PH UR STRIP.AUTO: 5.5 [PH] (ref 5–8)
PLATELET # BLD AUTO: 186 10*3/MM3 (ref 140–450)
PMV BLD AUTO: 11.2 FL (ref 6–12)
POTASSIUM SERPL-SCNC: 4.5 MMOL/L (ref 3.5–5.2)
PROT SERPL-MCNC: 7.2 G/DL (ref 6–8.5)
PROT UR QL STRIP: ABNORMAL
RBC # BLD AUTO: 4.71 10*6/MM3 (ref 4.14–5.8)
SODIUM SERPL-SCNC: 136 MMOL/L (ref 136–145)
SP GR UR STRIP: >=1.03 (ref 1–1.03)
TRIGL SERPL-MCNC: 110 MG/DL (ref 0–150)
TSH SERPL DL<=0.05 MIU/L-ACNC: 1.94 UIU/ML (ref 0.27–4.2)
UROBILINOGEN UR QL STRIP: ABNORMAL
VLDLC SERPL-MCNC: 20 MG/DL (ref 5–40)
WBC NRBC COR # BLD: 6.94 10*3/MM3 (ref 3.4–10.8)

## 2023-01-17 PROCEDURE — 82570 ASSAY OF URINE CREATININE: CPT

## 2023-01-17 PROCEDURE — 82043 UR ALBUMIN QUANTITATIVE: CPT

## 2023-01-17 PROCEDURE — 81003 URINALYSIS AUTO W/O SCOPE: CPT

## 2023-01-17 PROCEDURE — 80061 LIPID PANEL: CPT

## 2023-01-17 PROCEDURE — 99396 PREV VISIT EST AGE 40-64: CPT | Performed by: FAMILY MEDICINE

## 2023-01-17 PROCEDURE — 80050 GENERAL HEALTH PANEL: CPT

## 2023-01-17 PROCEDURE — 83036 HEMOGLOBIN GLYCOSYLATED A1C: CPT | Performed by: FAMILY MEDICINE

## 2023-01-17 RX ORDER — EMPAGLIFLOZIN 25 MG/1
25 TABLET, FILM COATED ORAL DAILY
Qty: 90 TABLET | Refills: 0 | Status: SHIPPED | OUTPATIENT
Start: 2023-01-17 | End: 2023-02-28

## 2023-01-17 RX ORDER — SEMAGLUTIDE 1.34 MG/ML
INJECTION, SOLUTION SUBCUTANEOUS
Qty: 1.5 ML | Refills: 0 | Status: SHIPPED | OUTPATIENT
Start: 2023-01-17 | End: 2023-02-28

## 2023-01-17 NOTE — PROGRESS NOTES
Annual Well Adult Visit     Patient Name: Sandra Colon  : 1970   MRN: [id number]   Care Team: Patient Care Team:  Miguel Angel Marquez DO as PCP - General (Family Medicine)  Miguel Angel Marquez DO as Referring Physician (Family Medicine)  Roma Abbott MD as Consulting Physician (Endocrinology)    Chief Complaint:    Chief Complaint   Patient presents with   • Annual Exam       History of Present Illness: Sandra Colon is a 52 y.o. male who presents today for annual physical exam and preventative care.    HPI    Pleasant 52-year-old male with uncontrolled diabetes on metformin, hypertension, and history of pheochromocytoma who presents today for annual exam.  He was in the ER at [facility] 2 weeks ago for hyperglycemia, A1c at that time was 11.5%.  He is established with endocrinology, last saw in 2022 but canceled his follow-up in May.  When he is compliant with his metformin he has fair control, with an A1c at 7.6% in  but up to 10.9% last year with decreased adherence to his metformin.    This patient is accompanied by their self who contributes to the history of their care.    The following portions of the patient's history were reviewed and updated as appropriate: allergies, current medications, past family history, past medical history, past social history, past surgical history and problem list.       Allied Screenings    N/A         Date      Eye Exam       []              []   Up to date    Location:   []   Recommended       Counseled every 2 years in those without known issues, yearly if wearing glasses or contacts      Dental Exam       []           []   Up to date   Location:   []   Recommended       Counseled, recommended cleanings every 6 months, daily brushing and flossing        Skin Cancer Screening        []            []   Up to date   Location:   []   Recommended Counseled on regular sunscreen wear, self-skin checks      Obesity Counseling        []        []    Complete   []   Nutritionist referral   []   Declined Counseled on moderate portions, low meat diet focusing on whole foods and plant-based protein           Diabetes  [] Yes  [] No    N/A         Date      Eye Exam       []             []   Complete         Date:  Where:         Foot Exam       []           []   Complete              Obesity Counseling       []        []   Complete          Additional Testing         Date      Colorectal Screening        []   N/A   []   Complete         Date:     Where:         Pap        []   N/A   []   Complete    Date:     Where:         Mammogram           []   N/A   []   Complete Date:     Where:      PSA  (Over age 50)     []   N/A   []   Complete    Date:     Where:      US Aorta  (For male with >20 cigarette history, age 65)     []   N/A   []   Complete    Date:     Where:      CT for Smoker  (Age 55-75, 30 pk yr)     []   N/A   []   Complete    Date:     Where:      Bone Density/DEXA        []   N/A   []   Complete    Date:     Follow-up:      Hep. C     []   N/A   []   Complete         Subjective      Review of Systems:   Review of Systems - See HPI    Past Medical History:   Past Medical History:   Diagnosis Date   • Adrenal tumor    • Diabetes mellitus (HCC)    • Fatty liver    • Hyperlipidemia    • Hypertension    • Kidney tumor        Past Surgical History:   Past Surgical History:   Procedure Laterality Date   • ADRENALECTOMY Left 2018   • CYST REMOVAL Left     on wrist   • KNEE SURGERY Left        Family History:   Family History   Problem Relation Age of Onset   • No Known Problems Mother    • Diabetes Father    • No Known Problems Sister    • No Known Problems Brother    • Cancer Paternal Grandmother    • Diabetes Paternal Grandfather        Social History:   Social History     Socioeconomic History   • Marital status:    Tobacco Use   • Smoking status: Never   • Smokeless tobacco: Never   Vaping Use   • Vaping Use: Never used   Substance and Sexual  Activity   • Alcohol use: Yes     Alcohol/week: 2.0 standard drinks     Types: 2 Standard drinks or equivalent per week     Comment: only on weekends, none in 2 weeks   • Drug use: No   • Sexual activity: Defer       Tobacco History:   Social History     Tobacco Use   Smoking Status Never   Smokeless Tobacco Never       Medications:     Current Outpatient Medications:   •  atorvastatin (LIPITOR) 40 MG tablet, Take 1 tablet by mouth Daily., Disp: 90 tablet, Rfl: 3  •  benazepril-hydrochlorthiazide (LOTENSIN HCT) 5-6.25 MG per tablet, Take 1 tablet by mouth Daily., Disp: 90 tablet, Rfl: 3  •  glucose blood test strip, 1 strip by Subdermal route Daily., Disp: , Rfl:   •  Hydrocortisone Ace-Pramoxine 1-1 % rectal cream, Insert  into the rectum 2 (Two) Times a Day As Needed for Hemorrhoids., Disp: 28.4 g, Rfl: 1  •  Lancet Devices misc, 1 Piece by Subdermal route Daily., Disp: , Rfl:   •  metFORMIN ER (GLUCOPHAGE-XR) 500 MG 24 hr tablet, Take 2 tablets by mouth Daily With Breakfast & Dinner., Disp: 360 tablet, Rfl: 3  •  Jardiance 25 MG tablet tablet, Take 1 tablet by mouth Daily., Disp: 90 tablet, Rfl: 0  •  mupirocin (BACTROBAN) 2 % ointment, Apply 1 g topically daily to wound for 14 days or until healed, Disp: 14 g, Rfl: 1  •  Ozempic, 0.25 or 0.5 MG/DOSE, 2 MG/1.5ML solution pen-injector, Inject 0.25 mg under the skin into the appropriate area as directed 1 (One) Time Per Week for 28 days, THEN 0.5 mg 1 (One) Time Per Week for 14 days., Disp: 1.5 mL, Rfl: 0    Immunizations:     Immunizations    N/A    Prescribed/Refused    Date      Td/Tdap  (Booster Q 10 yrs)    []             Prescribed    []     Refused        []             Flu  (Yearly)    []          Prescribed    []     Refused        []              Pneumovax  (1 yr after Prevnar)    []          Prescribed    []     Refused        []              Prevnar 20    []          Prescribed    []     Refused        []              Hep B       []          Prescribed   "  []     Refused        []             COVID-19   []      Prescribed    []     Refused        []          Shingrix  (Age 50 and older)    []          Prescribed    []     Refused        []              Immunization History   Administered Date(s) Administered   • COVID-19 (ANGELO) 04/01/2021   • Hepatitis B 12/10/2001, 01/14/2002, 06/28/2002, 02/24/2017   • TD Preservative Free 06/01/2006   • Td 06/28/2002   • Tdap 11/18/2016        Allergies:   Allergies   Allergen Reactions   • Lisinopril Rash       Objective   Objective     Physical Exam:  Vital Signs:   Vitals:    01/17/23 0815   BP: 110/68   BP Location: Left arm   Patient Position: Sitting   Cuff Size: Adult   Pulse: 104   Temp: 97.7 °F (36.5 °C)   TempSrc: Infrared   SpO2: 97%   Weight: 95.4 kg (210 lb 6.4 oz)   Height: 174 cm (68.5\")     Body mass index is 31.53 kg/m².     Physical Exam  Nursing note reviewed  General: Patient is well-nourished, well-developed, and in no acute distress.  HEENT: Normocephalic with no contusions noted, no ptosis or palsy. Pupils equally round and reactive to light, extraocular movements intact. Patient holds steady gaze, can follow to midline. Hearing grossly normal without deficit, exterior auricles normal, tympanic membranes normal without erythema or bulging.  Lymphatic: Posterior auricular, cervical, submandibular, supraclavicular, axillary lymphatic sites palpated without abnormality  Cardiovascular: Normal study rate without ectopy. PMI palpated, normal. Normal S1, S2. No murmurs rubs or gallops.  Respiratory: No tenderness to palpation on the chest wall, lungs clear to auscultation bilaterally, no wheezes, rales, or rhonchi. No pleural friction rubs.  Gastrointestinal: Bowel sounds present, normoactive globally. No bruit noted. Nontender, nondistended. Normal percussive exam, no hepatomegaly, no splenomegaly. No hernias, scars, gross lesions.  Extremities: No cyanosis or edema, 2+ pulses bilaterally, reflexes normal. " Capillary refill time normal.  MSK: Normal gait and station. 5/5 strength globally.  Neuro: Cranial nerves II-XII grossly intact. Patient alert and oriented x3.  PHQ-9 Depression Screening  Little interest or pleasure in doing things?     Feeling down, depressed, or hopeless?     Trouble falling or staying asleep, or sleeping too much?     Feeling tired or having little energy?     Poor appetite or overeating?     Feeling bad about yourself - or that you are a failure or have let yourself or your family down?     Trouble concentrating on things, such as reading the newspaper or watching television?     Moving or speaking so slowly that other people could have noticed? Or the opposite - being so fidgety or restless that you have been moving around a lot more than usual?     Thoughts that you would be better off dead, or of hurting yourself in some way?     PHQ-9 Total Score     If you checked off any problems, how difficult have these problems made it for you to do your work, take care of things at home, or get along with other people?       Procedures/Radiology     Procedures  No radiology results for the last 7 days     Assessment & Plan   Assessment / Plan      Assessment/Plan:   Problems Addressed This Visit  Diagnoses and all orders for this visit:    1. Well adult exam (Primary)  -     CBC (No Diff); Future  -     Lipid Panel; Future  -     Comprehensive Metabolic Panel; Future  -     Urinalysis With Microscopic If Indicated (No Culture) - Urine, Clean Catch; Future  -     TSH; Future  -     Microalbumin / Creatinine Urine Ratio - Urine, Clean Catch; Future    2. Uncontrolled type 2 diabetes mellitus with hyperglycemia (HCC)  Assessment & Plan:  Lab Results   Component Value Date    HGBA1C 11.50 (H) 01/04/2023   Previously could not tolerate glipizide or sitagliptin.  He had good control when his compliant with his metformin.  Recommend increasing adherence to diet, adding Ozempic once weekly, along with  Jardiance 25 mg daily.  We will follow-up in about a month.    Orders:  -     Jardiance 25 MG tablet tablet; Take 1 tablet by mouth Daily.  Dispense: 90 tablet; Refill: 0  -     Ozempic, 0.25 or 0.5 MG/DOSE, 2 MG/1.5ML solution pen-injector; Inject 0.25 mg under the skin into the appropriate area as directed 1 (One) Time Per Week for 28 days, THEN 0.5 mg 1 (One) Time Per Week for 14 days.  Dispense: 1.5 mL; Refill: 0  -     Microalbumin / Creatinine Urine Ratio - Urine, Clean Catch; Future    3. Essential hypertension    4. Mixed hyperlipidemia  -     Lipid Panel; Future    5. Foreskin inflammation  Comments:  Dry skin, resolves with daily showering  Orders:  -     mupirocin (BACTROBAN) 2 % ointment; Apply 1 g topically daily to wound for 14 days or until healed  Dispense: 14 g; Refill: 1    Problem List Items Addressed This Visit        Cardiac and Vasculature    Mixed hyperlipidemia (Chronic)    Overview     Moderate compliance with atorvastatin. Goal LDL <70         Relevant Orders    Lipid Panel    Essential hypertension (Chronic)       Endocrine and Metabolic    Uncontrolled type 2 diabetes mellitus with hyperglycemia (HCC)    Current Assessment & Plan     Lab Results   Component Value Date    HGBA1C 11.50 (H) 01/04/2023   Previously could not tolerate glipizide or sitagliptin.  He had good control when his compliant with his metformin.  Recommend increasing adherence to diet, adding Ozempic once weekly, along with Jardiance 25 mg daily.  We will follow-up in about a month.         Relevant Medications    Jardiance 25 MG tablet tablet    Ozempic, 0.25 or 0.5 MG/DOSE, 2 MG/1.5ML solution pen-injector    Other Relevant Orders    Microalbumin / Creatinine Urine Ratio - Urine, Clean Catch   Other Visit Diagnoses     Well adult exam    -  Primary    Relevant Orders    CBC (No Diff)    Lipid Panel    Comprehensive Metabolic Panel    Urinalysis With Microscopic If Indicated (No Culture) - Urine, Clean Catch    TSH     Microalbumin / Creatinine Urine Ratio - Urine, Clean Catch    Foreskin inflammation        Dry skin, resolves with daily showering    Relevant Medications    mupirocin (BACTROBAN) 2 % ointment          See patient diagnoses and orders along with patient instructions for assessment, plan, and changes to care for patient.    The preventative exam has been reviewed in detail.  The patient has been fully counseled on preventative guidelines for vaccines, cancer screenings, and other health maintenance needs. The patient was counseled on maintaining a lifestyle to promote good health and to minimize chronic diseases.  The patient has been assisted with scheduling healthcare procedures for the coming year and given a written document outlining these recommendations. Age-appropriate screening measures have been ordered for the patient today as indicated above.    There are no Patient Instructions on file for this visit.    Follow Up:   Return in about 1 month (around 2/17/2023) for sugar review.    DO LASHAUN Hernandez RD  [facility]  [address]  [facility] [address]  Fax [phone]  Phone [phone]

## 2023-01-17 NOTE — ASSESSMENT & PLAN NOTE
Lab Results   Component Value Date    HGBA1C 11.50 (H) 01/04/2023   Previously could not tolerate glipizide or sitagliptin.  He had good control when his compliant with his metformin.  Recommend increasing adherence to diet, adding Ozempic once weekly, along with Jardiance 25 mg daily.  We will follow-up in about a month.

## 2023-01-30 ENCOUNTER — TELEPHONE (OUTPATIENT)
Dept: FAMILY MEDICINE CLINIC | Facility: CLINIC | Age: 53
End: 2023-01-30
Payer: COMMERCIAL

## 2023-01-30 NOTE — TELEPHONE ENCOUNTER
Caller: Sandra Colon    Relationship: Self    Best call back number: [phone]    What is the best time to reach you: ANYTIME    Who are you requesting to speak with (clinical staff, provider,  specific staff member): CLINICAL STAFF    Do you know the name of the person who called: PATIENT    What was the call regarding: PATIENT WAS PRESCRIBED A NEW MEDICATION AT LAST APPOINTMENT AND IT IS TOO EXPENSIVE.  HE DOES NOT RECALL THE NAME OF THIS MEDICATION    Do you require a callback: YES

## 2023-01-30 NOTE — TELEPHONE ENCOUNTER
Contacted patient to inquire further, he reports that jardiance and ozempic and very expensive copay due to high deductible plan    Advised him to contact his insurance rep for more info on affordable alternatives. He will follow up with more information.

## 2023-02-28 ENCOUNTER — OFFICE VISIT (OUTPATIENT)
Dept: FAMILY MEDICINE CLINIC | Facility: CLINIC | Age: 53
End: 2023-02-28
Payer: COMMERCIAL

## 2023-02-28 VITALS
BODY MASS INDEX: 32.54 KG/M2 | DIASTOLIC BLOOD PRESSURE: 80 MMHG | TEMPERATURE: 98.4 F | SYSTOLIC BLOOD PRESSURE: 102 MMHG | OXYGEN SATURATION: 97 % | HEART RATE: 84 BPM | WEIGHT: 217.2 LBS

## 2023-02-28 DIAGNOSIS — E11.65 UNCONTROLLED TYPE 2 DIABETES MELLITUS WITH HYPERGLYCEMIA: Primary | ICD-10-CM

## 2023-02-28 LAB
EXPIRATION DATE: NORMAL
GLUCOSE BLDC GLUCOMTR-MCNC: 9 MG/DL (ref 70–130)
HBA1C MFR BLD: 9.3 %
Lab: NORMAL

## 2023-02-28 PROCEDURE — 99213 OFFICE O/P EST LOW 20 MIN: CPT | Performed by: FAMILY MEDICINE

## 2023-02-28 PROCEDURE — 82962 GLUCOSE BLOOD TEST: CPT | Performed by: FAMILY MEDICINE

## 2023-02-28 PROCEDURE — 83036 HEMOGLOBIN GLYCOSYLATED A1C: CPT | Performed by: FAMILY MEDICINE

## 2023-02-28 RX ORDER — ALOGLIPTIN 25 MG/1
25 TABLET, FILM COATED ORAL DAILY
Qty: 30 TABLET | Refills: 2 | Status: SHIPPED | OUTPATIENT
Start: 2023-02-28

## 2023-02-28 RX ORDER — ALOGLIPTIN 12.5 MG/1
1 TABLET, FILM COATED ORAL DAILY
Qty: 30 TABLET | Refills: 2 | Status: CANCELLED | OUTPATIENT
Start: 2023-02-28

## 2023-02-28 NOTE — PROGRESS NOTES
Established Patient Office Visit      Patient Name: Sandra Colon  : 1970   MRN: [id number]   Care Team: Patient Care Team:  Miguel Angel Marquez DO as PCP - General (Family Medicine)  Miguel Angel Marquez DO as Referring Physician (Family Medicine)  Roma Abbott MD as Consulting Physician (Endocrinology)    Chief Complaint:    Chief Complaint   Patient presents with   • Follow-up     1 month follow-up       History of Present Illness: Sandra Colon is a 53 y.o. male who is here today for chief complaint.    HPI    Seen for 1 month follow-up, last visit in January.  He has previously been referred to establish with endocrinology but not in about a year.  He was unable to  the Jardiance or the Ozempic due to cost greater than $2000.  He has continued just on metformin since then.  Since his last appt his sugars have been 140-145. Drinking tea his grandmother gave him, has been keeping his sugar down. Last A1c 10.9%.    This patient is accompanied by their self who contributes to the history of their care.    The following portions of the patient's history were reviewed and updated as appropriate: allergies, current medications, past family history, past medical history, past social history, past surgical history and problem list.    Subjective      Review of Systems:   Review of Systems - See HPI    Past Medical History:   Past Medical History:   Diagnosis Date   • Adrenal tumor    • Diabetes mellitus (HCC)    • Fatty liver    • Hyperlipidemia    • Hypertension    • Kidney tumor        Past Surgical History:   Past Surgical History:   Procedure Laterality Date   • ADRENALECTOMY Left 2018   • CYST REMOVAL Left     on wrist   • KNEE SURGERY Left        Family History:   Family History   Problem Relation Age of Onset   • No Known Problems Mother    • Diabetes Father    • No Known Problems Sister    • No Known Problems Brother    • Cancer Paternal Grandmother    • Diabetes Paternal Grandfather         Social History:   Social History     Socioeconomic History   • Marital status:    Tobacco Use   • Smoking status: Never   • Smokeless tobacco: Never   Vaping Use   • Vaping Use: Never used   Substance and Sexual Activity   • Alcohol use: Yes     Alcohol/week: 2.0 standard drinks     Types: 2 Standard drinks or equivalent per week     Comment: only on weekends, none in 2 weeks   • Drug use: No   • Sexual activity: Defer       Tobacco History:   Social History     Tobacco Use   Smoking Status Never   Smokeless Tobacco Never       Medications:     Current Outpatient Medications:   •  atorvastatin (LIPITOR) 40 MG tablet, Take 1 tablet by mouth Daily., Disp: 90 tablet, Rfl: 3  •  benazepril-hydrochlorthiazide (LOTENSIN HCT) 5-6.25 MG per tablet, Take 1 tablet by mouth Daily., Disp: 90 tablet, Rfl: 3  •  glucose blood test strip, 1 each by Subdermal route Daily., Disp: , Rfl:   •  Hydrocortisone Ace-Pramoxine 1-1 % rectal cream, Insert  into the rectum 2 (Two) Times a Day As Needed for Hemorrhoids., Disp: 28.4 g, Rfl: 1  •  Lancet Devices misc, 1 Piece by Subdermal route Daily., Disp: , Rfl:   •  metFORMIN ER (GLUCOPHAGE-XR) 500 MG 24 hr tablet, Take 2 tablets by mouth Daily With Breakfast & Dinner., Disp: 360 tablet, Rfl: 3  •  mupirocin (BACTROBAN) 2 % ointment, Apply 1 g topically daily to wound for 14 days or until healed, Disp: 14 g, Rfl: 1  •  Alogliptin Benzoate 25 MG tablet, Take 1 tablet by mouth Daily., Disp: 30 tablet, Rfl: 2    Allergies:   Allergies   Allergen Reactions   • Lisinopril Rash       Objective   Objective     Physical Exam:  Vital Signs:   Vitals:    02/28/23 1333   BP: 102/80   BP Location: Left arm   Patient Position: Sitting   Cuff Size: Adult   Pulse: 84   Temp: 98.4 °F (36.9 °C)   TempSrc: Infrared   SpO2: 97%   Weight: 98.5 kg (217 lb 3.2 oz)     Body mass index is 32.54 kg/m².     Physical Exam  Nursing note reviewed  Const: NAD, A&Ox4, Pleasant, Cooperative  Eyes: EOMI, no  conjunctivitis  ENT: No nasal discharge present, neck supple  Procedures/Radiology     Procedures  No radiology results for the last 7 days     Assessment & Plan   Assessment / Plan      Assessment/Plan:   Problems Addressed This Visit  Diagnoses and all orders for this visit:    1. Uncontrolled type 2 diabetes mellitus with hyperglycemia (HCC) (Primary)  Assessment & Plan:    A1c today 9.3% down from 10.9%. FSBS 99 nonfasting today.  Tea is cinnamon, clove, patricia leaf.  Drinking a cup every other day.  Continue metformin XR 500mg 2 tabs in AM.  Will add alogliptin 25 mg daily.  I cannot really argue with his results from the tea he is drinking and his changes, bring his A1c down a percentage point in a little over a month.  -We will follow-up with an A1c in 3 months    Orders:  -     Alogliptin Benzoate 25 MG tablet; Take 1 tablet by mouth Daily.  Dispense: 30 tablet; Refill: 2  -     POC Glycosylated Hemoglobin (Hb A1C)  -     POCT Glucose    Problem List Items Addressed This Visit        Endocrine and Metabolic    Uncontrolled type 2 diabetes mellitus with hyperglycemia (HCC) - Primary    Overview     Metformin  mg 2 tablets every morning.  Jardiance and Ozempic were both cost prohibitive (>$2000)  He started drinking a folk medicine tea including cinnamon, clove, and what sounds like Patricia leaf.  This actually remarkably improved his sugars over the course of a month.  He will bring list of ingredients and recipe to his next visit.  -Adding alogliptin 25 mg daily on 2/28/2023         Current Assessment & Plan       A1c today 9.3% down from 10.9%. FSBS 99 nonfasting today.  Tea is cinnamon, clove, patricia leaf.  Drinking a cup every other day.  Continue metformin XR 500mg 2 tabs in AM.  Will add alogliptin 25 mg daily.  I cannot really argue with his results from the tea he is drinking and his changes, bring his A1c down a percentage point in a little over a month.  -We will follow-up with an A1c in 3  months         Relevant Medications    Alogliptin Benzoate 25 MG tablet    Other Relevant Orders    POC Glycosylated Hemoglobin (Hb A1C) (Completed)    POCT Glucose         There are no Patient Instructions on file for this visit.    Follow Up:   Return in about 3 months (around 5/28/2023) for with A1c;.    DO LASHAUN Hernandez RD  [facility]  [address]  [facility] [phone]  Fax [phone]  Phone [phone]

## 2023-02-28 NOTE — ASSESSMENT & PLAN NOTE
A1c today 9.3% down from 10.9%. FSBS 99 nonfasting today.  Tea is cinnamon, clove, ha leaf.  Drinking a cup every other day.  Continue metformin XR 500mg 2 tabs in AM.  Will add alogliptin 25 mg daily.  I cannot really argue with his results from the tea he is drinking and his changes, bring his A1c down a percentage point in a little over a month.  -We will follow-up with an A1c in 3 months

## 2023-05-30 ENCOUNTER — OFFICE VISIT (OUTPATIENT)
Dept: FAMILY MEDICINE CLINIC | Facility: CLINIC | Age: 53
End: 2023-05-30

## 2023-05-30 VITALS
WEIGHT: 213.6 LBS | DIASTOLIC BLOOD PRESSURE: 78 MMHG | HEART RATE: 64 BPM | HEIGHT: 69 IN | BODY MASS INDEX: 31.64 KG/M2 | TEMPERATURE: 96.4 F | SYSTOLIC BLOOD PRESSURE: 118 MMHG

## 2023-05-30 DIAGNOSIS — I10 ESSENTIAL HYPERTENSION: Primary | ICD-10-CM

## 2023-05-30 DIAGNOSIS — E11.65 UNCONTROLLED TYPE 2 DIABETES MELLITUS WITH HYPERGLYCEMIA: ICD-10-CM

## 2023-05-30 LAB
EXPIRATION DATE: NORMAL
HBA1C MFR BLD: 8.1 %
Lab: NORMAL

## 2023-05-30 RX ORDER — BENAZEPRIL HYDROCHLORIDE AND HYDROCHLOROTHIAZIDE 5; 6.25 MG/1; MG/1
1 TABLET ORAL DAILY
Qty: 90 TABLET | Refills: 3 | Status: SHIPPED | OUTPATIENT
Start: 2023-05-30

## 2023-05-30 NOTE — ASSESSMENT & PLAN NOTE
A1c today 8.1% down from 9.3% in February down from 10.9% in January.  Tea is cinnamon, clove, ha leaf.  Drinking a cup every other day.  Continue metformin XR 500mg 2 tabs in AM plus alogliptin 25 mg daily.  I cannot really argue with his results from the tea he is drinking and his changes, bring his A1c down 2+ points in about 4 months.  -We will follow-up with an A1c in 3 months

## 2023-05-30 NOTE — PROGRESS NOTES
Subjective   Sandra Colon is a 53 y.o. male.     Chief Complaint   Patient presents with   • Diabetes     3 month follow-up a1c       History of Present Illness     Sandra Colon presents today for   Chief Complaint   Patient presents with   • Diabetes     3 month follow-up a1c     he is in need of chronic disease followup. he denies acute complaints today.  He has uncontrolled diabetes, with cost being a large factor in medications and inability to control.  He is on metformin ER 1000 mg AM and alogliptin 25 mg daily.  He reports that he went off of the tea he was drinking for couple months after 1 time when he took it along with the metformin and it dropped his sugar into the 80s.  The homemade tea he is drinking has done very well for his blood sugar, brought his A1c down over 1 percentage point over the course of a month and January to February.  He has been back during the day for the last few weeks.    This patient is accompanied by their self who contributes to the history of their care.    The following portions of the patient's history were reviewed and updated as appropriate: allergies, current medications, past family history, past medical history, past social history, past surgical history and problem list.    Current Outpatient Medications   Medication Instructions   • Alogliptin Benzoate 25 mg, Oral, Daily   • atorvastatin (LIPITOR) 40 mg, Oral, Daily   • benazepril-hydrochlorthiazide (LOTENSIN HCT) 5-6.25 MG per tablet 1 tablet, Oral, Daily   • glucose blood test strip 1 strip, Subdermal, Daily   • Lancet Devices misc 1 Piece, Subdermal, Daily   • metFORMIN ER (GLUCOPHAGE-XR) 1,000 mg, Oral, Daily With Breakfast & Dinner     Active Ambulatory Problems     Diagnosis Date Noted   • Fatty liver    • Mixed hyperlipidemia    • H/O pheochromocytoma 10/15/2017   • Uncontrolled type 2 diabetes mellitus with hyperglycemia (HCC) 06/11/2018   • Essential hypertension 09/09/2021     Resolved  "Ambulatory Problems     Diagnosis Date Noted   • Renal mass 06/11/2018   • Gross hematuria 06/11/2018     Past Medical History:   Diagnosis Date   • Adrenal tumor    • Diabetes mellitus    • Hyperlipidemia    • Hypertension    • Kidney tumor      Past Surgical History:   Procedure Laterality Date   • ADRENALECTOMY Left 2018   • CYST REMOVAL Left     on wrist   • KNEE SURGERY Left      Family History   Problem Relation Age of Onset   • No Known Problems Mother    • Diabetes Father    • No Known Problems Sister    • No Known Problems Brother    • Cancer Paternal Grandmother    • Diabetes Paternal Grandfather      Social History     Socioeconomic History   • Marital status:    Tobacco Use   • Smoking status: Never   • Smokeless tobacco: Never   Vaping Use   • Vaping Use: Never used   Substance and Sexual Activity   • Alcohol use: Yes     Alcohol/week: 2.0 standard drinks     Types: 2 Standard drinks or equivalent per week     Comment: only on weekends, none in 2 weeks   • Drug use: No   • Sexual activity: Defer       Review of Systems  Review of Systems -  General ROS: negative for - chills, fever or night sweats  Cardiovascular ROS: no chest pain or dyspnea on exertion  Gastrointestinal ROS: no abdominal pain, change in bowel habits, or black or bloody stools  Genito-Urinary ROS: no trouble voiding or gross hematuria    Objective   Blood pressure 118/78, pulse 64, temperature 96.4 °F (35.8 °C), temperature source Infrared, height 174 cm (68.5\"), weight 96.9 kg (213 lb 9.6 oz).  Nursing note reviewed  Physical Exam  Const: NAD, A&Ox4, Pleasant, Cooperative  Eyes: EOMI, no conjunctivitis  ENT: No nasal discharge present, neck supple  Cardiac: Regular rate and rhythm, no cyanosis  Resp: Respiratory rate within normal limits, no increased work of breathing, no audible wheezing or retractions noted  GI: No distention or ascites  Procedures  Assessment & Plan     Problem List Items Addressed This Visit        Cardiac " and Vasculature    Essential hypertension - Primary (Chronic)    Relevant Medications    benazepril-hydrochlorthiazide (LOTENSIN HCT) 5-6.25 MG per tablet    Other Relevant Orders    POC Glycosylated Hemoglobin (Hb A1C) (Completed)       Endocrine and Metabolic    Uncontrolled type 2 diabetes mellitus with hyperglycemia (HCC)    Overview     Metformin  mg 2 tablets every morning.  Jardiance and Ozempic were both cost prohibitive (>$2000)  He started drinking a folk medicine tea including cinnamon, clove, and what sounds like Patricia leaf.  This actually remarkably improved his sugars over the course of a month.  He will bring list of ingredients and recipe to his next visit.  -Adding alogliptin 25 mg daily on 2/28/2023         Current Assessment & Plan       A1c today 8.1% down from 9.3% in February down from 10.9% in January.  Tea is cinnamon, clove, patricia leaf.  Drinking a cup every other day.  Continue metformin XR 500mg 2 tabs in AM plus alogliptin 25 mg daily.  I cannot really argue with his results from the tea he is drinking and his changes, bring his A1c down 2+ points in about 4 months.  -We will follow-up with an A1c in 3 months          See patient diagnoses and orders along with patient instructions for assessment, plan, and changes to care for patient.    Patient Instructions   1. A1c today 8.1%, down from 9.3% at last visit      Return in about 3 months (around 8/30/2023) for with A1c;.    Ambulatory progress note signed and attested to by Miguel Angel Marquez D.O.

## 2023-08-28 ENCOUNTER — OFFICE VISIT (OUTPATIENT)
Dept: FAMILY MEDICINE CLINIC | Facility: CLINIC | Age: 53
End: 2023-08-28
Payer: COMMERCIAL

## 2023-08-28 VITALS
HEIGHT: 69 IN | TEMPERATURE: 98.4 F | BODY MASS INDEX: 32.61 KG/M2 | SYSTOLIC BLOOD PRESSURE: 122 MMHG | HEART RATE: 74 BPM | OXYGEN SATURATION: 98 % | WEIGHT: 220.2 LBS | DIASTOLIC BLOOD PRESSURE: 80 MMHG

## 2023-08-28 DIAGNOSIS — N52.9 ERECTILE DYSFUNCTION, UNSPECIFIED ERECTILE DYSFUNCTION TYPE: ICD-10-CM

## 2023-08-28 DIAGNOSIS — E11.9 TYPE 2 DIABETES MELLITUS WITHOUT COMPLICATION, WITHOUT LONG-TERM CURRENT USE OF INSULIN: Primary | ICD-10-CM

## 2023-08-28 LAB
EXPIRATION DATE: NORMAL
HBA1C MFR BLD: 7.8 %
Lab: NORMAL

## 2023-08-28 RX ORDER — SILDENAFIL 100 MG/1
100 TABLET, FILM COATED ORAL DAILY PRN
Qty: 15 TABLET | Refills: 0 | Status: SHIPPED | OUTPATIENT
Start: 2023-08-28

## 2023-08-28 RX ORDER — METFORMIN HYDROCHLORIDE 500 MG/1
500 TABLET, EXTENDED RELEASE ORAL
Qty: 180 TABLET | Refills: 3 | Status: SHIPPED | OUTPATIENT
Start: 2023-08-28

## 2023-08-28 NOTE — PROGRESS NOTES
"Established Patient Office Visit      Patient Name: Sandra Colon  : 1970   MRN: [id number]   Care Team: Patient Care Team:  Miguel Angel Marquez DO as PCP - General (Family Medicine)  Miguel Angel Marquez DO as Referring Physician (Family Medicine)  Roma Abbott MD as Consulting Physician (Endocrinology)    Chief Complaint:    Chief Complaint   Patient presents with    Diabetes     Last A1C 23  8.1    Hypertension       History of Present Illness: Sandra Colon is a 53 y.o. male who is here today for chief complaint.    HPI    He is here today to follow-up on his diabetes.  He takes metformin ER 1000 mg twice daily along with alogliptin 25 mg daily. A1c in May 8.1% down from 9.3% in February down from 10.9% in January.  He started drinking a homemade tea every other day made from cinnamon, clove, and Patricia leaf that was able to bring his A1c down by 2+ points over the course of 4 months.  At his last visit in May I told him to continue his current care and we will continue to monitor.  Jardiance and Ozempic have both been cost prohibitive in the past. 2-3 cinnamon sticks, 1/2 tsp clove, 2-3 patricia leaves.    States he has only been taking the metformin 500mg BID and not drinking the teat very often (\"just lazy\").    Having issues with erections.    This patient is accompanied by their self who contributes to the history of their care.    The following portions of the patient's history were reviewed and updated as appropriate: allergies, current medications, past family history, past medical history, past social history, past surgical history and problem list.    Subjective      Review of Systems:   Review of Systems - See HPI    Past Medical History:   Past Medical History:   Diagnosis Date    Adrenal tumor     Diabetes mellitus     Fatty liver     Hyperlipidemia     Hypertension     Kidney tumor        Past Surgical History:   Past Surgical History:   Procedure Laterality Date    " ADRENALECTOMY Left 2018    CYST REMOVAL Left     on wrist    KNEE SURGERY Left        Family History:   Family History   Problem Relation Age of Onset    No Known Problems Mother     Diabetes Father     No Known Problems Sister     No Known Problems Brother     Cancer Paternal Grandmother     Diabetes Paternal Grandfather        Social History:   Social History     Socioeconomic History    Marital status:    Tobacco Use    Smoking status: Never     Passive exposure: Never    Smokeless tobacco: Never   Vaping Use    Vaping Use: Never used   Substance and Sexual Activity    Alcohol use: Yes     Alcohol/week: 2.0 standard drinks     Types: 2 Standard drinks or equivalent per week     Comment: only on weekends, none in 2 weeks    Drug use: No    Sexual activity: Defer       Tobacco History:   Social History     Tobacco Use   Smoking Status Never    Passive exposure: Never   Smokeless Tobacco Never       Medications:     Current Outpatient Medications:     Alogliptin Benzoate 25 MG tablet, Take 1 tablet by mouth Daily., Disp: 30 tablet, Rfl: 2    atorvastatin (LIPITOR) 40 MG tablet, Take 1 tablet by mouth Daily., Disp: 90 tablet, Rfl: 3    benazepril-hydrochlorthiazide (LOTENSIN HCT) 5-6.25 MG per tablet, Take 1 tablet by mouth Daily., Disp: 90 tablet, Rfl: 3    glucose blood test strip, 1 each by Subdermal route Daily., Disp: , Rfl:     Lancet Devices misc, 1 Piece by Subdermal route Daily., Disp: , Rfl:     metFORMIN ER (GLUCOPHAGE-XR) 500 MG 24 hr tablet, Take 1 tablet by mouth Daily With Breakfast & Dinner., Disp: 180 tablet, Rfl: 3    sildenafil (Viagra) 100 MG tablet, Take 1 tablet by mouth Daily As Needed for Erectile Dysfunction., Disp: 15 tablet, Rfl: 0    Allergies:   Allergies   Allergen Reactions    Lisinopril Rash       Objective   Objective     Physical Exam:  Vital Signs:   Vitals:    08/28/23 1519   BP: 122/80   BP Location: Left arm   Patient Position: Sitting   Cuff Size: Adult   Pulse: 74  "  Temp: 98.4 °F (36.9 °C)   TempSrc: Oral   SpO2: 98%   Weight: 99.9 kg (220 lb 3.2 oz)   Height: 174 cm (68.5\")   PainSc: 0-No pain     Body mass index is 32.99 kg/m².     Physical Exam  Nursing note reviewed  Const: NAD, A&Ox4, Pleasant, Cooperative  Eyes: EOMI, no conjunctivitis  ENT: No nasal discharge present, neck supple  Cardiac: Regular rate and rhythm, no cyanosis  Resp: Respiratory rate within normal limits, no increased work of breathing, no audible wheezing or retractions noted  GI: No distention or ascites  MSK: Motor and sensation grossly intact in bilateral upper extremities  Neurologic: CN II-XII grossly intact  Psych: Appropriate mood and behavior.  Skin: Warm, dry  Procedures/Radiology     Procedures  No radiology results for the last 7 days     Assessment & Plan   Assessment / Plan      Assessment/Plan:   Problems Addressed This Visit  Diagnoses and all orders for this visit:    1. Type 2 diabetes mellitus without complication, without long-term current use of insulin (Primary)  -     POC Glycosylated Hemoglobin (Hb A1C)  -     metFORMIN ER (GLUCOPHAGE-XR) 500 MG 24 hr tablet; Take 1 tablet by mouth Daily With Breakfast & Dinner.  Dispense: 180 tablet; Refill: 3    2. Erectile dysfunction, unspecified erectile dysfunction type  -     sildenafil (Viagra) 100 MG tablet; Take 1 tablet by mouth Daily As Needed for Erectile Dysfunction.  Dispense: 15 tablet; Refill: 0      Problem List Items Addressed This Visit    None  Visit Diagnoses       Type 2 diabetes mellitus without complication, without long-term current use of insulin    -  Primary    Relevant Medications    metFORMIN ER (GLUCOPHAGE-XR) 500 MG 24 hr tablet    Other Relevant Orders    POC Glycosylated Hemoglobin (Hb A1C) (Completed)    Erectile dysfunction, unspecified erectile dysfunction type        Relevant Medications    sildenafil (Viagra) 100 MG tablet              There are no Patient Instructions on file for this visit.    Follow Up: "   Return in about 3 months (around 11/28/2023) for with A1c;.      DO LASHAUN Hernandez RD  [facility]  [address]  [facility] [address]  Fax [phone]  Phone [phone]

## 2023-11-28 ENCOUNTER — LAB (OUTPATIENT)
Dept: LAB | Facility: HOSPITAL | Age: 53
End: 2023-11-28
Payer: COMMERCIAL

## 2023-11-28 ENCOUNTER — OFFICE VISIT (OUTPATIENT)
Dept: FAMILY MEDICINE CLINIC | Facility: CLINIC | Age: 53
End: 2023-11-28
Payer: COMMERCIAL

## 2023-11-28 VITALS
WEIGHT: 229 LBS | SYSTOLIC BLOOD PRESSURE: 120 MMHG | DIASTOLIC BLOOD PRESSURE: 76 MMHG | BODY MASS INDEX: 33.92 KG/M2 | HEIGHT: 69 IN

## 2023-11-28 DIAGNOSIS — E11.9 TYPE 2 DIABETES MELLITUS WITHOUT COMPLICATION, WITHOUT LONG-TERM CURRENT USE OF INSULIN: Primary | ICD-10-CM

## 2023-11-28 DIAGNOSIS — R07.9 CHEST PAIN, UNSPECIFIED TYPE: ICD-10-CM

## 2023-11-28 LAB
D DIMER PPP FEU-MCNC: 0.31 MCGFEU/ML (ref 0–0.54)
EXPIRATION DATE: ABNORMAL
HBA1C MFR BLD: 8.4 % (ref 4.5–5.7)
Lab: ABNORMAL

## 2023-11-28 PROCEDURE — 85379 FIBRIN DEGRADATION QUANT: CPT | Performed by: FAMILY MEDICINE

## 2023-11-28 NOTE — PROGRESS NOTES
Established Patient Office Visit      Patient Name: Sandra Colon  : 1970   MRN: [id number]   Care Team: Patient Care Team:  Miguel Angel Marquez DO as PCP - General (Family Medicine)  Miguel Angel Marquez DO as Referring Physician (Family Medicine)  Roma Abbott MD as Consulting Physician (Endocrinology)    Chief Complaint:    Chief Complaint   Patient presents with    Diabetes     3 month follow-up DM2       History of Present Illness: Sandra Colon is a 53 y.o. male who is here today for chief complaint.    HPI    Stopped drinking his tea a few months ago, but still taking his meds.    This patient is accompanied by their self who contributes to the history of their care.    The following portions of the patient's history were reviewed and updated as appropriate: allergies, current medications, past family history, past medical history, past social history, past surgical history and problem list.    Subjective      Review of Systems:   Review of Systems - See HPI    Past Medical History:   Past Medical History:   Diagnosis Date    Adrenal tumor     Diabetes mellitus     Fatty liver     Hyperlipidemia     Hypertension     Kidney tumor        Past Surgical History:   Past Surgical History:   Procedure Laterality Date    ADRENALECTOMY Left 2018    CYST REMOVAL Left     on wrist    KNEE SURGERY Left        Family History:   Family History   Problem Relation Age of Onset    No Known Problems Mother     Diabetes Father     No Known Problems Sister     No Known Problems Brother     Cancer Paternal Grandmother     Diabetes Paternal Grandfather        Social History:   Social History     Socioeconomic History    Marital status:    Tobacco Use    Smoking status: Never     Passive exposure: Never    Smokeless tobacco: Never   Vaping Use    Vaping Use: Never used   Substance and Sexual Activity    Alcohol use: Yes     Alcohol/week: 2.0 standard drinks of alcohol     Types: 2 Standard drinks or  "equivalent per week     Comment: only on weekends, none in 2 weeks    Drug use: No    Sexual activity: Defer       Tobacco History:   Social History     Tobacco Use   Smoking Status Never    Passive exposure: Never   Smokeless Tobacco Never       Medications:     Current Outpatient Medications:     Alogliptin Benzoate 25 MG tablet, Take 1 tablet by mouth Daily., Disp: 30 tablet, Rfl: 2    atorvastatin (LIPITOR) 40 MG tablet, Take 1 tablet by mouth Daily., Disp: 90 tablet, Rfl: 3    benazepril-hydrochlorthiazide (LOTENSIN HCT) 5-6.25 MG per tablet, Take 1 tablet by mouth Daily., Disp: 90 tablet, Rfl: 3    glucose blood test strip, 1 each by Subdermal route Daily., Disp: , Rfl:     Lancet Devices misc, 1 Piece by Subdermal route Daily., Disp: , Rfl:     metFORMIN ER (GLUCOPHAGE-XR) 500 MG 24 hr tablet, Take 1 tablet by mouth Daily With Breakfast & Dinner., Disp: 180 tablet, Rfl: 3    sildenafil (Viagra) 100 MG tablet, Take 1 tablet by mouth Daily As Needed for Erectile Dysfunction., Disp: 15 tablet, Rfl: 0    Allergies:   Allergies   Allergen Reactions    Lisinopril Rash       Objective   Objective     Physical Exam:  Vital Signs:   Vitals:    11/28/23 1512   BP: 120/76   BP Location: Left arm   Patient Position: Sitting   Cuff Size: Adult   Weight: 104 kg (229 lb)   Height: 174 cm (68.5\")     Body mass index is 34.31 kg/m².     Physical Exam  Nursing note reviewed  Const: NAD, A&Ox4, Pleasant, Cooperative  Eyes: EOMI, no conjunctivitis  ENT: No nasal discharge present, neck supple  Cardiac: Regular rate and rhythm, no cyanosis  Resp: Respiratory rate within normal limits, no increased work of breathing, no audible wheezing or retractions noted  GI: No distention or ascites  MSK: Motor and sensation grossly intact in bilateral upper extremities  Neurologic: CN II-XII grossly intact  Psych: Appropriate mood and behavior.  Skin: Warm, dry  Procedures/Radiology     Procedures  No radiology results for the last 7 days "     Assessment & Plan   Assessment / Plan      Assessment/Plan:   Problems Addressed This Visit  Diagnoses and all orders for this visit:    1. Type 2 diabetes mellitus without complication, without long-term current use of insulin (Primary)  Assessment & Plan:  Recommended that he start drinking the teat regularly again as his A1c has worsened significantly  Lab Results   Component Value Date    HGBA1C 8.4 (A) 11/28/2023       Orders:  -     POC Glycosylated Hemoglobin (Hb A1C)    2. Chest pain, unspecified type  -     D-dimer, Quantitative; Future  -     D-dimer, Quantitative      Problem List Items Addressed This Visit          Endocrine and Metabolic    Type 2 diabetes mellitus without complication, without long-term current use of insulin - Primary    Overview     Metformin  mg 2 tablets every morning.  Jardiance and Ozempic were both cost prohibitive (>$2000)  He started drinking a folk medicine tea including cinnamon, clove, and what sounds like Patricia leaf.  This actually remarkably improved his sugars over the course of a month.  He will bring list of ingredients and recipe to his next visit.  -Adding alogliptin 25 mg daily on 2/28/2023         Current Assessment & Plan     Recommended that he start drinking the teat regularly again as his A1c has worsened significantly  Lab Results   Component Value Date    HGBA1C 8.4 (A) 11/28/2023          Relevant Orders    POC Glycosylated Hemoglobin (Hb A1C) (Completed)     Other Visit Diagnoses       Chest pain, unspecified type        Relevant Orders    D-dimer, Quantitative (Completed)          Having some chest pain he is a little worried about. Unlikely to be anything severe, but will check D-dimer today to rule out LUE clot or aortic dissection.    Patient Instructions       Follow Up:   Return in about 3 months (around 2/28/2024) for with A1c;.        DO LASHAUN Hernandez RD  [facility]  [address] [facility] [address]  Fax [phone]  Phone [phone]

## 2023-12-18 NOTE — ASSESSMENT & PLAN NOTE
Recommended that he start drinking the teat regularly again as his A1c has worsened significantly  Lab Results   Component Value Date    HGBA1C 8.4 (A) 11/28/2023

## 2024-02-29 ENCOUNTER — OFFICE VISIT (OUTPATIENT)
Dept: FAMILY MEDICINE CLINIC | Facility: CLINIC | Age: 54
End: 2024-02-29
Payer: COMMERCIAL

## 2024-02-29 VITALS
DIASTOLIC BLOOD PRESSURE: 66 MMHG | BODY MASS INDEX: 32.32 KG/M2 | HEIGHT: 69 IN | WEIGHT: 218.2 LBS | SYSTOLIC BLOOD PRESSURE: 108 MMHG

## 2024-02-29 DIAGNOSIS — E78.2 MIXED HYPERLIPIDEMIA: ICD-10-CM

## 2024-02-29 DIAGNOSIS — Z12.5 SCREENING PSA (PROSTATE SPECIFIC ANTIGEN): ICD-10-CM

## 2024-02-29 DIAGNOSIS — I10 ESSENTIAL HYPERTENSION: ICD-10-CM

## 2024-02-29 DIAGNOSIS — Z13.0 SCREENING FOR DEFICIENCY ANEMIA: ICD-10-CM

## 2024-02-29 DIAGNOSIS — E11.9 TYPE 2 DIABETES MELLITUS WITHOUT COMPLICATION, WITHOUT LONG-TERM CURRENT USE OF INSULIN: Primary | ICD-10-CM

## 2024-02-29 LAB
EXPIRATION DATE: ABNORMAL
EXPIRATION DATE: NORMAL
HBA1C MFR BLD: 7.7 % (ref 4.5–5.7)
Lab: ABNORMAL
Lab: NORMAL
POC CREATININE URINE: 50
POC MICROALBUMIN URINE: 80

## 2024-02-29 RX ORDER — METFORMIN HYDROCHLORIDE 500 MG/1
500 TABLET, EXTENDED RELEASE ORAL
Qty: 180 TABLET | Refills: 3 | Status: SHIPPED | OUTPATIENT
Start: 2024-02-29

## 2024-02-29 RX ORDER — BENAZEPRIL HYDROCHLORIDE AND HYDROCHLOROTHIAZIDE 5; 6.25 MG/1; MG/1
1 TABLET ORAL DAILY
Qty: 90 TABLET | Refills: 3 | Status: SHIPPED | OUTPATIENT
Start: 2024-02-29

## 2024-02-29 RX ORDER — ATORVASTATIN CALCIUM 40 MG/1
40 TABLET, FILM COATED ORAL DAILY
Qty: 90 TABLET | Refills: 3 | Status: SHIPPED | OUTPATIENT
Start: 2024-02-29

## 2024-03-17 NOTE — PROGRESS NOTES
Subjective   Sandra Colon is a 54 y.o. male.     Chief Complaint   Patient presents with    Diabetes     3 month follow-up       History of Present Illness     Sandra Colon presents today for   Chief Complaint   Patient presents with    Diabetes     3 month follow-up     he is in need of chronic disease followup. he denies acute complaints today.    This patient is accompanied by their self who contributes to the history of their care.    The following portions of the patient's history were reviewed and updated as appropriate: allergies, current medications, past family history, past medical history, past social history, past surgical history and problem list.    Current Outpatient Medications   Medication Instructions    atorvastatin (LIPITOR) 40 mg, Oral, Daily    benazepril-hydrochlorthiazide (LOTENSIN HCT) 5-6.25 MG per tablet 1 tablet, Oral, Daily    glucose blood test strip 1 strip, Subdermal, Daily    Lancet Devices misc 1 Piece, Subdermal, Daily    metFORMIN ER (GLUCOPHAGE-XR) 500 mg, Oral, Daily With Breakfast & Dinner    sildenafil (VIAGRA) 100 mg, Oral, Daily PRN     Active Ambulatory Problems     Diagnosis Date Noted    Fatty liver     Mixed hyperlipidemia     H/O pheochromocytoma 10/15/2017    Type 2 diabetes mellitus without complication, without long-term current use of insulin 06/11/2018    Essential hypertension 09/09/2021     Resolved Ambulatory Problems     Diagnosis Date Noted    Renal mass 06/11/2018    Gross hematuria 06/11/2018     Past Medical History:   Diagnosis Date    Adrenal tumor     Diabetes mellitus     Hyperlipidemia     Hypertension     Kidney tumor      Past Surgical History:   Procedure Laterality Date    ADRENALECTOMY Left 2018    CYST REMOVAL Left     on wrist    KNEE SURGERY Left      Family History   Problem Relation Age of Onset    No Known Problems Mother     Diabetes Father     No Known Problems Sister     No Known Problems Brother     Cancer Paternal  "Grandmother     Diabetes Paternal Grandfather      Social History     Socioeconomic History    Marital status:    Tobacco Use    Smoking status: Never     Passive exposure: Never    Smokeless tobacco: Never   Vaping Use    Vaping status: Never Used   Substance and Sexual Activity    Alcohol use: Yes     Alcohol/week: 2.0 standard drinks of alcohol     Types: 2 Standard drinks or equivalent per week     Comment: only on weekends, none in 2 weeks    Drug use: No    Sexual activity: Defer       Review of Systems  Review of Systems -  General ROS: negative for - chills, fever or night sweats  Cardiovascular ROS: no chest pain or dyspnea on exertion  Gastrointestinal ROS: no abdominal pain, change in bowel habits, or black or bloody stools  Genito-Urinary ROS: no trouble voiding or gross hematuria    Objective   Blood pressure 108/66, height 174 cm (68.5\"), weight 99 kg (218 lb 3.2 oz).  Nursing note reviewed  Physical Exam  Const: NAD, A&Ox4, Pleasant, Cooperative  Eyes: EOMI, no conjunctivitis  ENT: No nasal discharge present, neck supple  Cardiac: Regular rate and rhythm, no cyanosis  Resp: Respiratory rate within normal limits, no increased work of breathing, no audible wheezing or retractions noted  GI: No distention or ascites  Procedures  Assessment & Plan     Problem List Items Addressed This Visit          Cardiac and Vasculature    Mixed hyperlipidemia (Chronic)    Overview     Moderate compliance with atorvastatin. Goal LDL <70         Relevant Medications    atorvastatin (LIPITOR) 40 MG tablet    Other Relevant Orders    Lipid Panel    Essential hypertension (Chronic)    Relevant Medications    benazepril-hydrochlorthiazide (LOTENSIN HCT) 5-6.25 MG per tablet       Endocrine and Metabolic    Type 2 diabetes mellitus without complication, without long-term current use of insulin - Primary    Overview     Metformin  mg 2 tablets every morning.  Jardiance and Ozempic were both cost prohibitive " (>$2000)  He started drinking a folk medicine tea including cinnamon, clove, and what sounds like Patricia leaf.  This actually remarkably improved his sugars over the course of a month.  He will bring list of ingredients and recipe to his next visit.  -Adding alogliptin 25 mg daily on 2/28/2023         Relevant Medications    metFORMIN ER (GLUCOPHAGE-XR) 500 MG 24 hr tablet    Other Relevant Orders    POC Glycosylated Hemoglobin (Hb A1C) (Completed)    POCT microalbumin (Completed)    Comprehensive Metabolic Panel    Hemoglobin A1c     Other Visit Diagnoses       Screening for deficiency anemia        Relevant Orders    CBC & Differential    Screening PSA (prostate specific antigen)        Relevant Orders    PSA Screen          See patient diagnoses and orders along with patient instructions for assessment, plan, and changes to care for patient.    Patient Instructions   A1c 7.7%, down from 8.4% in November  Labs before next visit    Return in about 3 months (around 5/29/2024) for Annual, (fasting blood work 1 week prior to appt).    Ambulatory progress note signed and attested to by Miguel Angel Marquez D.O.

## 2024-05-28 ENCOUNTER — OFFICE VISIT (OUTPATIENT)
Dept: FAMILY MEDICINE CLINIC | Facility: CLINIC | Age: 54
End: 2024-05-28
Payer: COMMERCIAL

## 2024-05-28 ENCOUNTER — LAB (OUTPATIENT)
Dept: LAB | Facility: HOSPITAL | Age: 54
End: 2024-05-28
Payer: COMMERCIAL

## 2024-05-28 VITALS
DIASTOLIC BLOOD PRESSURE: 88 MMHG | OXYGEN SATURATION: 98 % | SYSTOLIC BLOOD PRESSURE: 142 MMHG | HEIGHT: 68 IN | RESPIRATION RATE: 16 BRPM | BODY MASS INDEX: 32.89 KG/M2 | WEIGHT: 217 LBS | HEART RATE: 89 BPM | TEMPERATURE: 98.2 F

## 2024-05-28 DIAGNOSIS — R10.32 LLQ ABDOMINAL PAIN: ICD-10-CM

## 2024-05-28 DIAGNOSIS — E11.9 TYPE 2 DIABETES MELLITUS WITHOUT COMPLICATION, WITHOUT LONG-TERM CURRENT USE OF INSULIN: ICD-10-CM

## 2024-05-28 DIAGNOSIS — E78.2 MIXED HYPERLIPIDEMIA: ICD-10-CM

## 2024-05-28 DIAGNOSIS — Z13.0 SCREENING FOR DEFICIENCY ANEMIA: ICD-10-CM

## 2024-05-28 DIAGNOSIS — I10 ESSENTIAL HYPERTENSION: ICD-10-CM

## 2024-05-28 DIAGNOSIS — Z12.5 SCREENING PSA (PROSTATE SPECIFIC ANTIGEN): ICD-10-CM

## 2024-05-28 DIAGNOSIS — E78.2 MIXED HYPERLIPIDEMIA: Chronic | ICD-10-CM

## 2024-05-28 DIAGNOSIS — E11.9 TYPE 2 DIABETES MELLITUS WITHOUT COMPLICATION, WITHOUT LONG-TERM CURRENT USE OF INSULIN: Primary | ICD-10-CM

## 2024-05-28 DIAGNOSIS — Z00.00 PREVENTATIVE HEALTH CARE: ICD-10-CM

## 2024-05-28 LAB
BASOPHILS # BLD AUTO: 0.04 10*3/MM3 (ref 0–0.2)
BASOPHILS NFR BLD AUTO: 0.6 % (ref 0–1.5)
DEPRECATED RDW RBC AUTO: 46.7 FL (ref 37–54)
EOSINOPHIL # BLD AUTO: 0.14 10*3/MM3 (ref 0–0.4)
EOSINOPHIL NFR BLD AUTO: 2.1 % (ref 0.3–6.2)
ERYTHROCYTE [DISTWIDTH] IN BLOOD BY AUTOMATED COUNT: 13.6 % (ref 12.3–15.4)
EXPIRATION DATE: ABNORMAL
HBA1C MFR BLD: 7.4 % (ref 4.5–5.7)
HBA1C MFR BLD: 7.7 % (ref 4.8–5.6)
HCT VFR BLD AUTO: 44.5 % (ref 37.5–51)
HGB BLD-MCNC: 15.1 G/DL (ref 13–17.7)
IMM GRANULOCYTES # BLD AUTO: 0.01 10*3/MM3 (ref 0–0.05)
IMM GRANULOCYTES NFR BLD AUTO: 0.2 % (ref 0–0.5)
LYMPHOCYTES # BLD AUTO: 1.61 10*3/MM3 (ref 0.7–3.1)
LYMPHOCYTES NFR BLD AUTO: 24.2 % (ref 19.6–45.3)
Lab: ABNORMAL
MCH RBC QN AUTO: 32.2 PG (ref 26.6–33)
MCHC RBC AUTO-ENTMCNC: 33.9 G/DL (ref 31.5–35.7)
MCV RBC AUTO: 94.9 FL (ref 79–97)
MONOCYTES # BLD AUTO: 0.57 10*3/MM3 (ref 0.1–0.9)
MONOCYTES NFR BLD AUTO: 8.6 % (ref 5–12)
NEUTROPHILS NFR BLD AUTO: 4.28 10*3/MM3 (ref 1.7–7)
NEUTROPHILS NFR BLD AUTO: 64.3 % (ref 42.7–76)
NRBC BLD AUTO-RTO: 0 /100 WBC (ref 0–0.2)
PLATELET # BLD AUTO: 200 10*3/MM3 (ref 140–450)
PMV BLD AUTO: 10.8 FL (ref 6–12)
RBC # BLD AUTO: 4.69 10*6/MM3 (ref 4.14–5.8)
WBC NRBC COR # BLD AUTO: 6.65 10*3/MM3 (ref 3.4–10.8)

## 2024-05-28 PROCEDURE — 99396 PREV VISIT EST AGE 40-64: CPT | Performed by: FAMILY MEDICINE

## 2024-05-28 PROCEDURE — 80053 COMPREHEN METABOLIC PANEL: CPT

## 2024-05-28 PROCEDURE — 80061 LIPID PANEL: CPT

## 2024-05-28 PROCEDURE — G0103 PSA SCREENING: HCPCS

## 2024-05-28 PROCEDURE — 83036 HEMOGLOBIN GLYCOSYLATED A1C: CPT

## 2024-05-28 PROCEDURE — 83036 HEMOGLOBIN GLYCOSYLATED A1C: CPT | Performed by: FAMILY MEDICINE

## 2024-05-28 PROCEDURE — 85025 COMPLETE CBC W/AUTO DIFF WBC: CPT

## 2024-05-28 RX ORDER — BENAZEPRIL HYDROCHLORIDE AND HYDROCHLOROTHIAZIDE 5; 6.25 MG/1; MG/1
1 TABLET ORAL DAILY
Qty: 90 TABLET | Refills: 3 | Status: SHIPPED | OUTPATIENT
Start: 2024-05-28

## 2024-05-28 RX ORDER — ATORVASTATIN CALCIUM 40 MG/1
40 TABLET, FILM COATED ORAL DAILY
Qty: 90 TABLET | Refills: 3 | Status: SHIPPED | OUTPATIENT
Start: 2024-05-28

## 2024-05-28 NOTE — PROGRESS NOTES
Annual Well Adult Visit     Patient Name: Sandra Colon  : 1970   MRN: [id number]   Care Team: Patient Care Team:  Miguel Angel Marquez DO as PCP - General (Family Medicine)  Miguel Angel Marquez DO as Referring Physician (Family Medicine)  Roma Abbott MD as Consulting Physician (Endocrinology)    Chief Complaint:    Chief Complaint   Patient presents with    Annual Exam    Abdominal Pain     Lower left x1 wk  Increases with spicy foods       HPI    History of Present Illness: Sandra Colon is a 54 y.o. male who presents today for annual physical exam and preventative care.    He is not taking his atorvastatin. Has not eaten today.  LLQ abd pain starting last week. A little better now, was very mild. Last colonoscopy . Needs labs done, did not complete before appt. Previoiusly rdered.    Recent Hospitalizations:  He was not admitted to the hospital during the last year.     The following portions of the patient's history were reviewed and updated as appropriate: allergies, current medications, past family history, past medical history, past social history, past surgical history and problem list.       Allied Screenings    N/A         Date      Eye Exam   No Vision Impairment    []              []   Up to date    Location:   [x]   Recommended       Counseled every 2 years in those without known issues, yearly if wearing glasses or contacts      Dental Exam   Dentition: fair    []           []   Up to date   Location:   [x]   Recommended       Counseled, recommended cleanings every 6 months, daily brushing and flossing        Skin Cancer Screening  Sharpe Skin Type: IV      [x]            []   Up to date   Location:   []   Recommended Counseled on regular sunscreen wear, self-skin checks      Obesity Counseling  Body mass index is 33.27 kg/m².       []        [x]   Complete   []   Nutritionist referral   []   Declined Counseled on moderate portions, low meat diet focusing on whole foods  "and plant-based protein          Additional Testing         Date      Colorectal Screening   Risk: average     []   N/A   []   Ordered today   [x]   Complete        Date:   2017- 10-year  Where:         PSA  (Over age 50)     []   N/A   [x]   Ordered today   []   Complete      No results found for: \"PSA\"        US Aorta  (For male with >20 cigarette history, age 65)     [x]   N/A   []   Ordered today   []   Complete    Date:     Where:      CT for Smoker  (Age 55-75, 30 pk yr)     [x]   N/A   []   Ordered today   []   Complete    Date:     Where:      Hep. C     [x]   Ordered today   []   Complete      No results found for: \"HEPCVIRUSABY\"         HTN screening  BP Readings from Last 1 Encounters:   05/28/24 142/88           [x]   On BP medication   []   Lifestyle measures   []   Normotensive   Lifestyle Factors:  Caffeine Intake: None  Alcohol Intake: occasional/infrequent and 1 day/week  Smoking status:   Social History     Tobacco Use   Smoking Status Never    Passive exposure: Never   Smokeless Tobacco Never      Exogenous hormone use: None  NSAID use: None  Exercise: not at all       Subjective      Review of Systems:   Review of Systems - See HPI    Past Medical History:   Past Medical History:   Diagnosis Date    Adrenal tumor     Diabetes mellitus     Fatty liver     Hyperlipidemia     Hypertension     Kidney tumor        Past Surgical History:   Past Surgical History:   Procedure Laterality Date    ADRENALECTOMY Left 2018    CYST REMOVAL Left     on wrist    KNEE SURGERY Left        Family History:   Family History   Problem Relation Age of Onset    No Known Problems Mother     Diabetes Father     No Known Problems Sister     No Known Problems Brother     Cancer Paternal Grandmother     Diabetes Paternal Grandfather        Social History:   Social History     Socioeconomic History    Marital status:    Tobacco Use    Smoking status: Never     Passive exposure: Never    Smokeless tobacco: Never " "  Vaping Use    Vaping status: Never Used   Substance and Sexual Activity    Alcohol use: Yes     Alcohol/week: 2.0 standard drinks of alcohol     Types: 2 Standard drinks or equivalent per week     Comment: only on weekends, none in 2 weeks    Drug use: No    Sexual activity: Defer       Social History     Social History Narrative    Lives in [place] with wife and 2 kids        Tobacco History:   Social History     Tobacco Use   Smoking Status Never    Passive exposure: Never   Smokeless Tobacco Never       Medications:     Current Outpatient Medications:     atorvastatin (LIPITOR) 40 MG tablet, Take 1 tablet by mouth Daily., Disp: 90 tablet, Rfl: 3    benazepril-hydrochlorthiazide (LOTENSIN HCT) 5-6.25 MG per tablet, Take 1 tablet by mouth Daily., Disp: 90 tablet, Rfl: 3    glucose blood test strip, 1 each by Subdermal route Daily., Disp: , Rfl:     Lancet Devices misc, 1 Piece by Subdermal route Daily., Disp: , Rfl:     metFORMIN ER (GLUCOPHAGE-XR) 500 MG 24 hr tablet, Take 1 tablet by mouth Daily With Breakfast & Dinner., Disp: 180 tablet, Rfl: 3    sildenafil (Viagra) 100 MG tablet, Take 1 tablet by mouth Daily As Needed for Erectile Dysfunction. (Patient not taking: Reported on 5/28/2024), Disp: 15 tablet, Rfl: 0    Immunizations:   Immunization History   Administered Date(s) Administered    COVID-19 (ANGELO) 04/01/2021    Hepatitis B Adult/Adolescent IM 12/10/2001, 01/14/2002, 06/28/2002, 02/24/2017    TD Preservative Free (Tenivac) 06/01/2006    Td (TDVAX) 06/28/2002    Tdap 11/18/2016        Allergies:   Allergies   Allergen Reactions    Lisinopril Rash       Objective   Objective     Physical Exam:  Vital Signs:   Vitals:    05/28/24 1451   BP: 142/88   BP Location: Right arm   Patient Position: Sitting   Cuff Size: Adult   Pulse: 89   Resp: 16   Temp: 98.2 °F (36.8 °C)   TempSrc: Infrared   SpO2: 98%   Weight: 98.4 kg (217 lb)   Height: 172 cm (67.72\")   PainSc:   4   PainLoc: Abdomen     Body mass " index is 33.27 kg/m².   Facility age limit for growth %stefano is 20 years.   Physical Exam  Nursing note reviewed  Const: NAD, Pleasant, Cooperative  Eyes: EOMI, no conjunctivitis  ENT: No nasal discharge present, neck supple  Cardiac: Regular rate and rhythm, no cyanosis  PHQ-2 Depression Screening  Little interest or pleasure in doing things?     Feeling down, depressed, or hopeless?     PHQ-2 Total Score       Procedures/Radiology     Procedures  No radiology results for the last 7 days         Assessment & Plan   Assessment / Plan      Assessment/Plan:   Problems Addressed This Visit  Diagnoses and all orders for this visit:    1. Type 2 diabetes mellitus without complication, without long-term current use of insulin (Primary)  Assessment & Plan:  7.4% today, a little better. Needs better compliance with diet and activity. Continue metformin ER 500mg daily    Orders:  -     POC Glycosylated Hemoglobin (Hb A1C)    2. Preventative health care    3. LLQ abdominal pain  Comments:  Resolving. If it returns, he will call.    4. Mixed hyperlipidemia  Assessment & Plan:  Recheck FLP today, he should start his atorvastatin    Orders:  -     atorvastatin (LIPITOR) 40 MG tablet; Take 1 tablet by mouth Daily.  Dispense: 90 tablet; Refill: 3    5. Essential hypertension  Assessment & Plan:  Lotensin 5-6.25mg, not taking. Refill provided    Orders:  -     benazepril-hydrochlorthiazide (LOTENSIN HCT) 5-6.25 MG per tablet; Take 1 tablet by mouth Daily.  Dispense: 90 tablet; Refill: 3      Problem List Items Addressed This Visit          Cardiac and Vasculature    Mixed hyperlipidemia (Chronic)    Overview     Poor compliance with atorvastatin. Goal LDL <70         Current Assessment & Plan     Recheck FLP today, he should start his atorvastatin         Relevant Medications    atorvastatin (LIPITOR) 40 MG tablet    Essential hypertension (Chronic)    Overview     Poor compliance with his Lotensin-HCT         Current Assessment &  Plan     Lotensin 5-6.25mg, not taking. Refill provided         Relevant Medications    benazepril-hydrochlorthiazide (LOTENSIN HCT) 5-6.25 MG per tablet       Endocrine and Metabolic    Type 2 diabetes mellitus without complication, without long-term current use of insulin - Primary    Overview     Metformin  mg 2 tablets every morning.  Jardiance and Ozempic were both cost prohibitive (>$2000)  He started drinking a folk medicine tea including cinnamon, clove, and what sounds like Patricia leaf.  This actually remarkably improved his sugars over the course of a month.  He will bring list of ingredients and recipe to his next visit.  -Added alogliptin 25 mg daily on 2/28/2023, he stopped taking this on his own after a month or so, drinking a homemeade tea instead         Current Assessment & Plan     7.4% today, a little better. Needs better compliance with diet and activity. Continue metformin ER 500mg daily         Relevant Orders    POC Glycosylated Hemoglobin (Hb A1C) (Completed)     Other Visit Diagnoses       Preventative health care        LLQ abdominal pain        Resolving. If it returns, he will call.            See patient diagnoses and orders along with patient instructions for assessment, plan, and changes to care for patient.    The preventative exam has been reviewed in detail.  The patient has been fully counseled on preventative guidelines for vaccines, cancer screenings, and other health maintenance needs. The patient was counseled on maintaining a lifestyle to promote good health and to minimize chronic diseases.  The patient has been assisted with scheduling healthcare procedures for the coming year and given a written document outlining these recommendations. Age-appropriate screening measures have been ordered for the patient today as indicated above.    There are no Patient Instructions on file for this visit.    Follow Up:   Return in about 3 months (around 8/28/2024) for with  A1c;.    DO LASHAUN Escamilla RD  [facility]  [address]  [facility] [address]  Fax [phone]  Phone [phone]

## 2024-05-28 NOTE — ASSESSMENT & PLAN NOTE
7.4% today, a little better. Needs better compliance with diet and activity. Continue metformin ER 500mg daily

## 2024-05-29 LAB
ALBUMIN SERPL-MCNC: 4.3 G/DL (ref 3.5–5.2)
ALBUMIN/GLOB SERPL: 1.5 G/DL
ALP SERPL-CCNC: 121 U/L (ref 39–117)
ALT SERPL W P-5'-P-CCNC: 86 U/L (ref 1–41)
ANION GAP SERPL CALCULATED.3IONS-SCNC: 10.9 MMOL/L (ref 5–15)
AST SERPL-CCNC: 42 U/L (ref 1–40)
BILIRUB SERPL-MCNC: 0.5 MG/DL (ref 0–1.2)
BUN SERPL-MCNC: 9 MG/DL (ref 6–20)
BUN/CREAT SERPL: 10.6 (ref 7–25)
CALCIUM SPEC-SCNC: 9.4 MG/DL (ref 8.6–10.5)
CHLORIDE SERPL-SCNC: 106 MMOL/L (ref 98–107)
CHOLEST SERPL-MCNC: 185 MG/DL (ref 0–200)
CO2 SERPL-SCNC: 23.1 MMOL/L (ref 22–29)
CREAT SERPL-MCNC: 0.85 MG/DL (ref 0.76–1.27)
EGFRCR SERPLBLD CKD-EPI 2021: 102.6 ML/MIN/1.73
GLOBULIN UR ELPH-MCNC: 2.9 GM/DL
GLUCOSE SERPL-MCNC: 120 MG/DL (ref 65–99)
HDLC SERPL-MCNC: 30 MG/DL (ref 40–60)
LDLC SERPL CALC-MCNC: 132 MG/DL (ref 0–100)
LDLC/HDLC SERPL: 4.34 {RATIO}
POTASSIUM SERPL-SCNC: 4.1 MMOL/L (ref 3.5–5.2)
PROT SERPL-MCNC: 7.2 G/DL (ref 6–8.5)
PSA SERPL-MCNC: 0.76 NG/ML (ref 0–4)
SODIUM SERPL-SCNC: 140 MMOL/L (ref 136–145)
TRIGL SERPL-MCNC: 124 MG/DL (ref 0–150)
VLDLC SERPL-MCNC: 23 MG/DL (ref 5–40)

## 2024-08-16 ENCOUNTER — OFFICE VISIT (OUTPATIENT)
Dept: FAMILY MEDICINE CLINIC | Facility: CLINIC | Age: 54
End: 2024-08-16
Payer: COMMERCIAL

## 2024-08-16 VITALS
HEIGHT: 68 IN | SYSTOLIC BLOOD PRESSURE: 122 MMHG | WEIGHT: 214 LBS | OXYGEN SATURATION: 99 % | HEART RATE: 66 BPM | DIASTOLIC BLOOD PRESSURE: 82 MMHG | BODY MASS INDEX: 32.43 KG/M2

## 2024-08-16 DIAGNOSIS — T75.4XXA ELECTROCUTION AND NONFATAL EFFECTS OF ELECTRIC CURRENT, INITIAL ENCOUNTER: Primary | ICD-10-CM

## 2024-08-16 DIAGNOSIS — M79.2 NEUROPATHIC PAIN: ICD-10-CM

## 2024-08-16 DIAGNOSIS — H53.8 BLURRY VISION, LEFT EYE: ICD-10-CM

## 2024-08-16 PROCEDURE — 99214 OFFICE O/P EST MOD 30 MIN: CPT | Performed by: FAMILY MEDICINE

## 2024-08-16 RX ORDER — CYCLOBENZAPRINE HCL 10 MG
10 TABLET ORAL 2 TIMES DAILY PRN
Qty: 14 TABLET | Refills: 0 | Status: SHIPPED | OUTPATIENT
Start: 2024-08-16 | End: 2024-08-23

## 2024-08-16 RX ORDER — GABAPENTIN 100 MG/1
100 CAPSULE ORAL 2 TIMES DAILY
Qty: 60 CAPSULE | Refills: 0 | Status: SHIPPED | OUTPATIENT
Start: 2024-08-16

## 2024-08-23 NOTE — PROGRESS NOTES
Established Patient Office Visit      Patient Name: Sandra Colon  : 1970   MRN: [id number]   Care Team: Patient Care Team:  Miguel Angel Marquez DO as PCP - General (Family Medicine)  Miguel Angel Marquez DO as Referring Physician (Family Medicine)  Roma Abbott MD as Consulting Physician (Endocrinology)    Chief Complaint:    Chief Complaint   Patient presents with    Electric Shock     Pt. States he has Left Arm, Shoulder, Neck Pain since Accicdent 24    Eye Problem     Left Eye Blurry        History of Present Illness: Sandra Colon is a 54 y.o. male who is here today for chief complaint.    HPI    He suffered an electrocution at work on 2024.  No loss of consciousness, however since that time he has noticed increased blurriness in his left eye, as well as of his left arm, shoulder, and neck pain.    This patient is accompanied by their self who contributes to the history of their care.    The following portions of the patient's history were reviewed and updated as appropriate: allergies, current medications, past family history, past medical history, past social history, past surgical history and problem list.    Subjective      Review of Systems:   Review of Systems - See HPI    Past Medical History:   Past Medical History:   Diagnosis Date    Adrenal tumor     Diabetes mellitus     Fatty liver     Hyperlipidemia     Hypertension     Kidney tumor        Past Surgical History:   Past Surgical History:   Procedure Laterality Date    ADRENALECTOMY Left 2018    CYST REMOVAL Left     on wrist    KNEE SURGERY Left        Family History:   Family History   Problem Relation Age of Onset    No Known Problems Mother     Diabetes Father     No Known Problems Sister     No Known Problems Brother     Cancer Paternal Grandmother     Diabetes Paternal Grandfather        Social History:   Social History     Socioeconomic History    Marital status:    Tobacco Use    Smoking status: Never  "    Passive exposure: Never    Smokeless tobacco: Never   Vaping Use    Vaping status: Never Used   Substance and Sexual Activity    Alcohol use: Yes     Alcohol/week: 2.0 standard drinks of alcohol     Types: 2 Standard drinks or equivalent per week     Comment: only on weekends, none in 2 weeks    Drug use: No    Sexual activity: Defer       Tobacco History:   Social History     Tobacco Use   Smoking Status Never    Passive exposure: Never   Smokeless Tobacco Never       Medications:   Outpatient Medications Prior to Visit   Medication Sig Dispense Refill    atorvastatin (LIPITOR) 40 MG tablet Take 1 tablet by mouth Daily. 90 tablet 3    benazepril-hydrochlorthiazide (LOTENSIN HCT) 5-6.25 MG per tablet Take 1 tablet by mouth Daily. 90 tablet 3    glucose blood test strip 1 each by Subdermal route Daily.      Lancet Devices misc 1 Piece by Subdermal route Daily.      metFORMIN ER (GLUCOPHAGE-XR) 500 MG 24 hr tablet Take 1 tablet by mouth Daily With Breakfast & Dinner. 180 tablet 3    sildenafil (Viagra) 100 MG tablet Take 1 tablet by mouth Daily As Needed for Erectile Dysfunction. 15 tablet 0     No facility-administered medications prior to visit.        Allergies:   Allergies   Allergen Reactions    Lisinopril Rash       Objective   Objective     Physical Exam:  Vital Signs:   Vitals:    08/16/24 1028   BP: 122/82   Pulse: 66   SpO2: 99%   Weight: 97.1 kg (214 lb)   Height: 172 cm (67.72\")   PainSc:   4   PainLoc: Generalized     Body mass index is 32.81 kg/m².     Physical Exam  Nursing note reviewed  Const: NAD, A&Ox4, Pleasant, Cooperative  Eyes: EOMI, no conjunctivitis  ENT: No nasal discharge present, neck supple  Cardiac: Regular rate and rhythm, no cyanosis  Resp: Respiratory rate within normal limits, no increased work of breathing, no audible wheezing or retractions noted  GI: No distention or ascites  MSK: Motor and sensation grossly intact in bilateral upper extremities  Neurologic: CN II-XII grossly " intact  Psych: Appropriate mood and behavior.  Skin: Warm, dry  Procedures/Radiology     Procedures  No radiology results for the last 7 days     Assessment & Plan   Assessment / Plan      Assessment/Plan:   Problems Addressed This Visit  Diagnoses and all orders for this visit:    1. Electrocution and nonfatal effects of electric current, initial encounter (Primary)  -     Ambulatory Referral to Ophthalmology  -     gabapentin (NEURONTIN) 100 MG capsule; Take 1 capsule by mouth 2 (Two) Times a Day.  Dispense: 60 capsule; Refill: 0  -     Ambulatory Referral to Neurology  -     cyclobenzaprine (FLEXERIL) 10 MG tablet; Take 1 tablet by mouth 2 (Two) Times a Day As Needed for Muscle Spasms for up to 7 days.  Dispense: 14 tablet; Refill: 0    2. Blurry vision, left eye  -     Ambulatory Referral to Ophthalmology    3. Neuropathic pain  -     gabapentin (NEURONTIN) 100 MG capsule; Take 1 capsule by mouth 2 (Two) Times a Day.  Dispense: 60 capsule; Refill: 0  -     Ambulatory Referral to Neurology  -     cyclobenzaprine (FLEXERIL) 10 MG tablet; Take 1 tablet by mouth 2 (Two) Times a Day As Needed for Muscle Spasms for up to 7 days.  Dispense: 14 tablet; Refill: 0      Problem List Items Addressed This Visit    None  Visit Diagnoses       Electrocution and nonfatal effects of electric current, initial encounter    -  Primary    Relevant Medications    gabapentin (NEURONTIN) 100 MG capsule    cyclobenzaprine (FLEXERIL) 10 MG tablet    Other Relevant Orders    Ambulatory Referral to Ophthalmology (Completed)    Ambulatory Referral to Neurology    Blurry vision, left eye        Relevant Orders    Ambulatory Referral to Ophthalmology (Completed)    Neuropathic pain        Relevant Medications    gabapentin (NEURONTIN) 100 MG capsule    cyclobenzaprine (FLEXERIL) 10 MG tablet    Other Relevant Orders    Ambulatory Referral to Neurology          Orders Placed This Encounter   Procedures    Ambulatory Referral to Ophthalmology      Referral Priority:   Urgent     Referral Type:   Consultation     Referral Reason:   Specialty Services Required     Requested Specialty:   Ophthalmology     Number of Visits Requested:   1    Ambulatory Referral to Neurology     Referral Priority:   Routine     Referral Type:   Consultation     Referral Reason:   Specialty Services Required     Requested Specialty:   Neurology     Number of Visits Requested:   1         Patient Instructions   A referral has been placed for you today.  You will be contacted by our referral coordinator or the specialist's office to schedule your appointment over the next 2 to 3 business days.  Please make sure to check your voicemail, and ensure that we have the correct phone number on file for you.  Sometimes, based on insurance requirements, referrals may take longer to schedule. However if you have not heard from anyone within 7 days, please call the office to check on your referral status.     Follow Up:   Return if symptoms worsen or fail to improve.        DO LASHAUN Hernandez RD  [facility]  [address]  [facility] [address]  Fax [phone]  Phone [phone]    Disclaimer to patients: The 21st Century C[employer] Act makes medical notes like these available to patients in the interest of transparency. However, please be advised that this is still a medical document. It is intended as [id number]-[id number] communication. Many sections may include medical language or jargon, abbreviations, and additional verbiage that are unfamiliar or confusing. In some ways it may come across as blunt, direct, or may be summarized in order to clearly and concisely communicate the most crucial information to medical professionals. It may also include mentions of conditions that are unlikely but considered as part of the differential diagnosis, including serious disorders. These are not always discussed at length at the time  of appointment because their likelihood is so low, but may be included in a medical note to make it clear what has been considered and/or ruled out as part of a work-up. Medical documents are intended to carry relevant information, facts as evident, and the personal clinical opinion of the physician. If you have any questions regarding this medical document, please bring them to the attention of the physician at your next scheduled appointment.

## 2024-08-29 ENCOUNTER — OFFICE VISIT (OUTPATIENT)
Dept: FAMILY MEDICINE CLINIC | Facility: CLINIC | Age: 54
End: 2024-08-29
Payer: COMMERCIAL

## 2024-08-29 VITALS
DIASTOLIC BLOOD PRESSURE: 88 MMHG | WEIGHT: 215.8 LBS | SYSTOLIC BLOOD PRESSURE: 132 MMHG | BODY MASS INDEX: 32.71 KG/M2 | OXYGEN SATURATION: 98 % | HEART RATE: 76 BPM | HEIGHT: 68 IN

## 2024-08-29 DIAGNOSIS — E11.9 TYPE 2 DIABETES MELLITUS WITHOUT COMPLICATION, WITHOUT LONG-TERM CURRENT USE OF INSULIN: Primary | ICD-10-CM

## 2024-08-29 DIAGNOSIS — R20.2 PARESTHESIAS IN RIGHT HAND: ICD-10-CM

## 2024-08-29 DIAGNOSIS — R29.898 WEAKNESS OF RIGHT HAND: ICD-10-CM

## 2024-08-29 DIAGNOSIS — H53.8 BLURRY VISION, LEFT EYE: ICD-10-CM

## 2024-08-29 DIAGNOSIS — F10.90 HEAVY ALCOHOL USE: ICD-10-CM

## 2024-08-29 DIAGNOSIS — T75.4XXA ELECTROCUTION AND NONFATAL EFFECTS OF ELECTRIC CURRENT, INITIAL ENCOUNTER: ICD-10-CM

## 2024-08-29 DIAGNOSIS — K22.0 ESOPHAGEAL ACHALASIA: ICD-10-CM

## 2024-08-29 LAB
EXPIRATION DATE: ABNORMAL
HBA1C MFR BLD: 7.7 % (ref 4.5–5.7)
Lab: ABNORMAL

## 2024-08-29 PROCEDURE — 99214 OFFICE O/P EST MOD 30 MIN: CPT | Performed by: FAMILY MEDICINE

## 2024-08-29 PROCEDURE — 83036 HEMOGLOBIN GLYCOSYLATED A1C: CPT | Performed by: FAMILY MEDICINE

## 2024-08-29 RX ORDER — OMEPRAZOLE 40 MG/1
40 CAPSULE, DELAYED RELEASE ORAL DAILY
Qty: 90 CAPSULE | Refills: 0 | Status: SHIPPED | OUTPATIENT
Start: 2024-08-29

## 2024-08-29 NOTE — PATIENT INSTRUCTIONS
Cut to 5 alcoholic drinks TOTAL per week  Try to drink the tea 5 times per week  If A1c is not under 7.5% in 3 months we will start the weekly injections  Start medication for esophagus, will refer back to gastroenterology for scope  Nerve conduction study and referral to orthopedist for right hand

## 2024-08-30 ENCOUNTER — TELEPHONE (OUTPATIENT)
Dept: FAMILY MEDICINE CLINIC | Facility: CLINIC | Age: 54
End: 2024-08-30
Payer: COMMERCIAL

## 2024-09-11 NOTE — PROGRESS NOTES
Established Patient Office Visit      Patient Name: Sandra Colon  : 1970   MRN: [id number]   Care Team: Patient Care Team:  Miguel Angel Marquez DO as PCP - General (Family Medicine)  Miguel Angel Marquez DO as Referring Physician (Family Medicine)  Roma Abbott MD as Consulting Physician (Endocrinology)    Chief Complaint:    Chief Complaint   Patient presents with    Diabetes    Numbness     Pt. States he noticed Right Hand and Right Arm Numbness for about a week.    Spots and/or Floaters     Pt. States he still has Left Eye Floaters. Pt. States he need STAT referral to Ophthalmology he can't wait until 2025 for appt.        History of Present Illness: Sandra Colon is a 54 y.o. male who is here today for chief complaint.    HPI    Multiple concerns today, including ongoing blurry vision in his left eye from the electrocution.  He also reports trouble swallowing and right arm paresthesias with weakness.    This patient is accompanied by their self who contributes to the history of their care.    The following portions of the patient's history were reviewed and updated as appropriate: allergies, current medications, past family history, past medical history, past social history, past surgical history and problem list.    Subjective      Review of Systems:   Review of Systems - See HPI    Past Medical History:   Past Medical History:   Diagnosis Date    Adrenal tumor     Diabetes mellitus     Fatty liver     Hyperlipidemia     Hypertension     Kidney tumor        Past Surgical History:   Past Surgical History:   Procedure Laterality Date    ADRENALECTOMY Left 2018    CYST REMOVAL Left     on wrist    KNEE SURGERY Left        Family History:   Family History   Problem Relation Age of Onset    No Known Problems Mother     Diabetes Father     No Known Problems Sister     No Known Problems Brother     Cancer Paternal Grandmother     Diabetes Paternal Grandfather        Social History:   Social  "History     Socioeconomic History    Marital status:    Tobacco Use    Smoking status: Never     Passive exposure: Never    Smokeless tobacco: Never   Vaping Use    Vaping status: Never Used   Substance and Sexual Activity    Alcohol use: Yes     Alcohol/week: 2.0 standard drinks of alcohol     Types: 2 Standard drinks or equivalent per week     Comment: only on weekends, none in 2 weeks    Drug use: No    Sexual activity: Defer       Tobacco History:   Social History     Tobacco Use   Smoking Status Never    Passive exposure: Never   Smokeless Tobacco Never       Medications:   Outpatient Medications Prior to Visit   Medication Sig Dispense Refill    atorvastatin (LIPITOR) 40 MG tablet Take 1 tablet by mouth Daily. 90 tablet 3    benazepril-hydrochlorthiazide (LOTENSIN HCT) 5-6.25 MG per tablet Take 1 tablet by mouth Daily. 90 tablet 3    gabapentin (NEURONTIN) 100 MG capsule Take 1 capsule by mouth 2 (Two) Times a Day. 60 capsule 0    glucose blood test strip 1 each by Subdermal route Daily.      Lancet Devices misc 1 Piece by Subdermal route Daily.      metFORMIN ER (GLUCOPHAGE-XR) 500 MG 24 hr tablet Take 1 tablet by mouth Daily With Breakfast & Dinner. 180 tablet 3    sildenafil (Viagra) 100 MG tablet Take 1 tablet by mouth Daily As Needed for Erectile Dysfunction. 15 tablet 0     No facility-administered medications prior to visit.        Allergies:   Allergies   Allergen Reactions    Lisinopril Rash       Objective   Objective     Physical Exam:  Vital Signs:   Vitals:    08/29/24 1454   BP: 132/88   Pulse: 76   SpO2: 98%   Weight: 97.9 kg (215 lb 12.8 oz)   Height: 172 cm (67.72\")   PainSc: 0-No pain     Body mass index is 33.09 kg/m².     Physical Exam  Nursing note reviewed  Const: NAD, A&Ox4, Pleasant, Cooperative  Eyes: EOMI, no conjunctivitis  ENT: No nasal discharge present, neck supple  Cardiac: Regular rate and rhythm, no cyanosis  Resp: Respiratory rate within normal limits, no increased work " of breathing, no audible wheezing or retractions noted  GI: No distention or ascites  MSK: Motor and sensation grossly intact in bilateral upper extremities  Neurologic: CN II-XII grossly intact  Psych: Appropriate mood and behavior.  Skin: Warm, dry  Procedures/Radiology     Procedures  No radiology results for the last 7 days     Assessment & Plan   Assessment / Plan      Assessment/Plan:   Problems Addressed This Visit  Diagnoses and all orders for this visit:    1. Type 2 diabetes mellitus without complication, without long-term current use of insulin (Primary)  -     POC Glycosylated Hemoglobin (Hb A1C)    2. Blurry vision, left eye  -     Ambulatory Referral to Ophthalmology    3. Electrocution and nonfatal effects of electric current, initial encounter  -     Ambulatory Referral to Ophthalmology    4. Heavy alcohol use    5. Esophageal achalasia  -     omeprazole (priLOSEC) 40 MG capsule; Take 1 capsule by mouth Daily.  Dispense: 90 capsule; Refill: 0    6. Weakness of right hand  -     EMG & Nerve Conduction Test; Future    7. Paresthesias in right hand  -     EMG & Nerve Conduction Test; Future    Other orders  -     EYE EXAM SCANNED      Problem List Items Addressed This Visit          Endocrine and Metabolic    Type 2 diabetes mellitus without complication, without long-term current use of insulin - Primary    Overview     Metformin  mg 2 tablets every morning.  Jardiance and Ozempic were both cost prohibitive (>$2000)  He started drinking a folk medicine tea including cinnamon, clove, and what sounds like Patricia leaf.  This actually remarkably improved his sugars over the course of a month.  He will bring list of ingredients and recipe to his next visit.  -Added alogliptin 25 mg daily on 2/28/2023, he stopped taking this on his own after a month or so, drinking a homemeade tea instead         Relevant Orders    POC Glycosylated Hemoglobin (Hb A1C) (Completed)     Other Visit Diagnoses       Blurry  vision, left eye        Relevant Orders    Ambulatory Referral to Ophthalmology (Completed)    Electrocution and nonfatal effects of electric current, initial encounter        Relevant Orders    Ambulatory Referral to Ophthalmology (Completed)    Heavy alcohol use        Esophageal achalasia        Relevant Medications    omeprazole (priLOSEC) 40 MG capsule    Weakness of right hand        Relevant Orders    EMG & Nerve Conduction Test    Paresthesias in right hand        Relevant Orders    EMG & Nerve Conduction Test          Orders Placed This Encounter   Procedures    Ambulatory Referral to Ophthalmology     Referral Priority:   Urgent     Referral Type:   Consultation     Referral Reason:   Specialty Services Required     Referred to Provider:   Larissa Rosales MD     Requested Specialty:   Ophthalmology     Number of Visits Requested:   1    POC Glycosylated Hemoglobin (Hb A1C)     Order Specific Question:   Release to patient     Answer:   Routine Release [[id number]]    EMG & Nerve Conduction Test     Standing Status:   Future     Standing Expiration Date:   8/29/2025     Order Specific Question:   Extremity     Answer:   right upper extremity     Order Specific Question:   Please specify number of extremities:     Answer:   1 Extremity     Order Specific Question:   Reason for Exam:     Answer:   numbness, paresthesias     Order Specific Question:   Release to patient     Answer:   Routine Release [[id number]]    EYE EXAM SCANNED       Patient Instructions   Cut to 5 alcoholic drinks TOTAL per week  Try to drink the tea 5 times per week  If A1c is not under 7.5% in 3 months we will start the weekly injections  Start medication for esophagus, will refer back to gastroenterology for scope  Nerve conduction study and referral to orthopedist for right hand    Follow Up:   Return in about 3 months (around 11/29/2024) for with A1c;.        DO LASHAUN Hernandez RD  [facility]  [address] [facility] [address]  Fax [phone]  Phone [phone]    Disclaimer to patients: The 21st Century Cares Act makes medical notes like these available to patients in the interest of transparency. However, please be advised that this is still a medical document. It is intended as [id number] communication. Many sections may include medical language or jargon, abbreviations, and additional verbiage that are unfamiliar or confusing. In some ways it may come across as blunt, direct, or may be summarized in order to clearly and concisely communicate the most crucial information to medical professionals. It may also include mentions of conditions that are unlikely but considered as part of the differential diagnosis, including serious disorders. These are not always discussed at length at the time of appointment because their likelihood is so low, but may be included in a medical note to make it clear what has been considered and/or ruled out as part of a work-up. Medical documents are intended to carry relevant information, facts as evident, and the personal clinical opinion of the physician. If you have any questions regarding this medical document, please bring them to the attention of the physician at your next scheduled appointment.

## 2024-12-25 ENCOUNTER — APPOINTMENT (OUTPATIENT)
Dept: GENERAL RADIOLOGY | Facility: HOSPITAL | Age: 54
End: 2024-12-25
Payer: COMMERCIAL

## 2024-12-25 ENCOUNTER — HOSPITAL ENCOUNTER (EMERGENCY)
Facility: HOSPITAL | Age: 54
Discharge: HOME | End: 2024-12-25
Attending: EMERGENCY MEDICINE | Admitting: EMERGENCY MEDICINE
Payer: COMMERCIAL

## 2024-12-25 ENCOUNTER — APPOINTMENT (OUTPATIENT)
Dept: CT IMAGING | Facility: HOSPITAL | Age: 54
End: 2024-12-25
Payer: COMMERCIAL

## 2024-12-25 VITALS
OXYGEN SATURATION: 98 % | SYSTOLIC BLOOD PRESSURE: 115 MMHG | RESPIRATION RATE: 18 BRPM | HEART RATE: 58 BPM | WEIGHT: 210 LBS | BODY MASS INDEX: 31.83 KG/M2 | HEIGHT: 68 IN | TEMPERATURE: 97.7 F | DIASTOLIC BLOOD PRESSURE: 98 MMHG

## 2024-12-25 DIAGNOSIS — E27.8 LEFT ADRENAL MASS: ICD-10-CM

## 2024-12-25 DIAGNOSIS — R07.9 NONSPECIFIC CHEST PAIN: Primary | ICD-10-CM

## 2024-12-25 DIAGNOSIS — K76.0 HEPATIC STEATOSIS: ICD-10-CM

## 2024-12-25 DIAGNOSIS — K57.90 DIVERTICULOSIS: ICD-10-CM

## 2024-12-25 LAB
ALBUMIN SERPL-MCNC: 3.8 G/DL (ref 3.5–5.2)
ALBUMIN/GLOB SERPL: 1.5 G/DL
ALP SERPL-CCNC: 134 U/L (ref 39–117)
ALT SERPL W P-5'-P-CCNC: 55 U/L (ref 1–41)
ANION GAP SERPL CALCULATED.3IONS-SCNC: 13 MMOL/L (ref 5–15)
AST SERPL-CCNC: 27 U/L (ref 1–40)
BASOPHILS # BLD AUTO: 0.04 10*3/MM3 (ref 0–0.2)
BASOPHILS NFR BLD AUTO: 0.6 % (ref 0–1.5)
BILIRUB SERPL-MCNC: 0.2 MG/DL (ref 0–1.2)
BILIRUB UR QL STRIP: NEGATIVE
BUN SERPL-MCNC: 12 MG/DL (ref 6–20)
BUN/CREAT SERPL: 17.6 (ref 7–25)
CALCIUM SPEC-SCNC: 8.1 MG/DL (ref 8.6–10.5)
CHLORIDE SERPL-SCNC: 104 MMOL/L (ref 98–107)
CLARITY UR: CLEAR
CO2 SERPL-SCNC: 21 MMOL/L (ref 22–29)
COLOR UR: YELLOW
CREAT SERPL-MCNC: 0.68 MG/DL (ref 0.76–1.27)
DEPRECATED RDW RBC AUTO: 42 FL (ref 37–54)
EGFRCR SERPLBLD CKD-EPI 2021: 109.8 ML/MIN/1.73
EOSINOPHIL # BLD AUTO: 0.27 10*3/MM3 (ref 0–0.4)
EOSINOPHIL NFR BLD AUTO: 3.8 % (ref 0.3–6.2)
ERYTHROCYTE [DISTWIDTH] IN BLOOD BY AUTOMATED COUNT: 12.4 % (ref 12.3–15.4)
GEN 5 1HR TROPONIN T REFLEX: 6 NG/L
GLOBULIN UR ELPH-MCNC: 2.6 GM/DL
GLUCOSE SERPL-MCNC: 145 MG/DL (ref 65–99)
GLUCOSE UR STRIP-MCNC: NEGATIVE MG/DL
HCT VFR BLD AUTO: 42.1 % (ref 37.5–51)
HGB BLD-MCNC: 15.4 G/DL (ref 13–17.7)
HGB UR QL STRIP.AUTO: NEGATIVE
HOLD SPECIMEN: NORMAL
IMM GRANULOCYTES # BLD AUTO: 0.07 10*3/MM3 (ref 0–0.05)
IMM GRANULOCYTES NFR BLD AUTO: 1 % (ref 0–0.5)
KETONES UR QL STRIP: ABNORMAL
LEUKOCYTE ESTERASE UR QL STRIP.AUTO: NEGATIVE
LIPASE SERPL-CCNC: 29 U/L (ref 13–60)
LYMPHOCYTES # BLD AUTO: 1.86 10*3/MM3 (ref 0.7–3.1)
LYMPHOCYTES NFR BLD AUTO: 26.4 % (ref 19.6–45.3)
MAGNESIUM SERPL-MCNC: 2 MG/DL (ref 1.6–2.6)
MCH RBC QN AUTO: 33.7 PG (ref 26.6–33)
MCHC RBC AUTO-ENTMCNC: 36.6 G/DL (ref 31.5–35.7)
MCV RBC AUTO: 92.1 FL (ref 79–97)
MONOCYTES # BLD AUTO: 0.67 10*3/MM3 (ref 0.1–0.9)
MONOCYTES NFR BLD AUTO: 9.5 % (ref 5–12)
NEUTROPHILS NFR BLD AUTO: 4.14 10*3/MM3 (ref 1.7–7)
NEUTROPHILS NFR BLD AUTO: 58.7 % (ref 42.7–76)
NITRITE UR QL STRIP: NEGATIVE
NRBC BLD AUTO-RTO: 0 /100 WBC (ref 0–0.2)
NT-PROBNP SERPL-MCNC: 62.3 PG/ML (ref 0–900)
PH UR STRIP.AUTO: 5.5 [PH] (ref 5–8)
PLATELET # BLD AUTO: 225 10*3/MM3 (ref 140–450)
PMV BLD AUTO: 10.6 FL (ref 6–12)
POTASSIUM SERPL-SCNC: 4.2 MMOL/L (ref 3.5–5.2)
PROT SERPL-MCNC: 6.4 G/DL (ref 6–8.5)
PROT UR QL STRIP: NEGATIVE
RBC # BLD AUTO: 4.57 10*6/MM3 (ref 4.14–5.8)
SODIUM SERPL-SCNC: 138 MMOL/L (ref 136–145)
SP GR UR STRIP: 1.01 (ref 1–1.03)
TROPONIN T NUMERIC DELTA: 0 NG/L
TROPONIN T SERPL HS-MCNC: 6 NG/L
UROBILINOGEN UR QL STRIP: ABNORMAL
WBC NRBC COR # BLD AUTO: 7.05 10*3/MM3 (ref 3.4–10.8)
WHOLE BLOOD HOLD COAG: NORMAL
WHOLE BLOOD HOLD SPECIMEN: NORMAL

## 2024-12-25 PROCEDURE — 25010000002 MORPHINE PER 10 MG: Performed by: EMERGENCY MEDICINE

## 2024-12-25 PROCEDURE — 93005 ELECTROCARDIOGRAM TRACING: CPT | Performed by: EMERGENCY MEDICINE

## 2024-12-25 PROCEDURE — 84484 ASSAY OF TROPONIN QUANT: CPT | Performed by: EMERGENCY MEDICINE

## 2024-12-25 PROCEDURE — 36415 COLL VENOUS BLD VENIPUNCTURE: CPT

## 2024-12-25 PROCEDURE — 99285 EMERGENCY DEPT VISIT HI MDM: CPT

## 2024-12-25 PROCEDURE — 80053 COMPREHEN METABOLIC PANEL: CPT | Performed by: EMERGENCY MEDICINE

## 2024-12-25 PROCEDURE — 83735 ASSAY OF MAGNESIUM: CPT

## 2024-12-25 PROCEDURE — 93005 ELECTROCARDIOGRAM TRACING: CPT

## 2024-12-25 PROCEDURE — 83690 ASSAY OF LIPASE: CPT | Performed by: EMERGENCY MEDICINE

## 2024-12-25 PROCEDURE — 25510000001 IOPAMIDOL 61 % SOLUTION: Performed by: EMERGENCY MEDICINE

## 2024-12-25 PROCEDURE — 83880 ASSAY OF NATRIURETIC PEPTIDE: CPT | Performed by: EMERGENCY MEDICINE

## 2024-12-25 PROCEDURE — 96374 THER/PROPH/DIAG INJ IV PUSH: CPT

## 2024-12-25 PROCEDURE — 81003 URINALYSIS AUTO W/O SCOPE: CPT

## 2024-12-25 PROCEDURE — 71045 X-RAY EXAM CHEST 1 VIEW: CPT

## 2024-12-25 PROCEDURE — 74177 CT ABD & PELVIS W/CONTRAST: CPT

## 2024-12-25 PROCEDURE — 85025 COMPLETE CBC W/AUTO DIFF WBC: CPT | Performed by: EMERGENCY MEDICINE

## 2024-12-25 RX ORDER — ASPIRIN 81 MG/1
324 TABLET, CHEWABLE ORAL ONCE
Status: COMPLETED | OUTPATIENT
Start: 2024-12-25 | End: 2024-12-25

## 2024-12-25 RX ORDER — MORPHINE SULFATE 4 MG/ML
4 INJECTION, SOLUTION INTRAMUSCULAR; INTRAVENOUS ONCE
Status: COMPLETED | OUTPATIENT
Start: 2024-12-25 | End: 2024-12-25

## 2024-12-25 RX ORDER — NITROGLYCERIN 0.4 MG/1
0.4 TABLET SUBLINGUAL
Qty: 100 TABLET | Refills: 0 | Status: SHIPPED | OUTPATIENT
Start: 2024-12-25

## 2024-12-25 RX ORDER — SODIUM CHLORIDE 0.9 % (FLUSH) 0.9 %
10 SYRINGE (ML) INJECTION AS NEEDED
Status: DISCONTINUED | OUTPATIENT
Start: 2024-12-25 | End: 2024-12-25 | Stop reason: HOSPADM

## 2024-12-25 RX ORDER — MORPHINE SULFATE 4 MG/ML
4 INJECTION, SOLUTION INTRAMUSCULAR; INTRAVENOUS ONCE
Status: DISCONTINUED | OUTPATIENT
Start: 2024-12-25 | End: 2024-12-25

## 2024-12-25 RX ORDER — IOPAMIDOL 612 MG/ML
90 INJECTION, SOLUTION INTRAVASCULAR
Status: COMPLETED | OUTPATIENT
Start: 2024-12-25 | End: 2024-12-25

## 2024-12-25 RX ORDER — NITROGLYCERIN 0.4 MG/1
0.4 TABLET SUBLINGUAL
Status: DISCONTINUED | OUTPATIENT
Start: 2024-12-25 | End: 2024-12-25 | Stop reason: HOSPADM

## 2024-12-25 RX ADMIN — IOPAMIDOL 90 ML: 612 INJECTION, SOLUTION INTRAVENOUS at 16:35

## 2024-12-25 RX ADMIN — ASPIRIN 81 MG 324 MG: 81 TABLET ORAL at 13:41

## 2024-12-25 RX ADMIN — NITROGLYCERIN 0.4 MG: 0.4 TABLET SUBLINGUAL at 13:41

## 2024-12-25 RX ADMIN — MORPHINE SULFATE 4 MG: 4 INJECTION, SOLUTION INTRAMUSCULAR; INTRAVENOUS at 13:41

## 2024-12-25 NOTE — Clinical Note
[facility] EMERGENCY DEPARTMENT  [address]  [facility] [address]  Phone: [phone]    Sandra Colon was seen and treated in our emergency department on 12/25/2024.  He may return to work on 12/28/2024.         Thank you for choosing [facility].    Anurag Norton, APRN

## 2024-12-25 NOTE — DISCHARGE INSTRUCTIONS
Call Dr. Medrano's office and schedule an appointment to discuss the left adrenal mass that has reoccurred.  Also follow-up with the chest pain clinic with the cardiology services, I have placed a referral and they will be calling you.  Carry your nitroglycerin bottle with you and if you have recurrence of chest pain take 1 dose in call your cardiologist.  Do not take sildenafil or Viagra, until cardiology has given you permission.  Do not take nitroglycerin when you have taken sildenafil recently.

## 2024-12-25 NOTE — ED PROVIDER NOTES
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  He reports very shortly thereafter that his left arm was somewhat numb, followed by severe pain in bilateral lower extremities, citing the origin at the iliac bifurcation, in both upper extremities.  He reports extremity pain is the worst now and the chest pain has become a dull ache.  Endorses that he often does not take his blood pressure medicine or his cholesterol medicine, and has not established with cardiology but sees primary care.  He has reduced his alcohol intake to 1 or 2 drinks on the weekend, and endorses an unhealthy diet.  He is obese.  Also, he endorses hide left upper quadrant hernia or distention that he notices after eating meals underlying the left renal surgical scar, and complains over the last 3 weeks of increasing left flank pain.  He endorses occasional constipation.    Review of Systems   Constitutional: Negative.    HENT: Negative.     Respiratory: Negative.     Cardiovascular:  Positive for chest pain.   Gastrointestinal:  Positive for abdominal pain.   Genitourinary:  Positive for flank pain. Negative for decreased urine volume, hematuria and urgency.   Skin: Negative.    Neurological: Negative.        Past Medical History:   Diagnosis Date   • Adrenal tumor    • Diabetes mellitus    • Fatty liver    • Hyperlipidemia    • Hypertension    • Kidney tumor        Allergies   Allergen Reactions   • Lisinopril Rash       Past Surgical History:   Procedure Laterality Date   • ADRENALECTOMY Left 2018   • CYST REMOVAL Left     on wrist   • KNEE SURGERY Left        Family History   Problem Relation Age of Onset   • No Known Problems Mother    • Diabetes Father    • No Known Problems Sister    • No Known Problems Brother    • Cancer Paternal  Grandmother    • Diabetes Paternal Grandfather        Social History     Socioeconomic History   • Marital status:    Tobacco Use   • Smoking status: Never     Passive exposure: Never   • Smokeless tobacco: Never   Vaping Use   • Vaping status: Never Used   Substance and Sexual Activity   • Alcohol use: Yes     Alcohol/week: 2.0 standard drinks of alcohol     Types: 2 Standard drinks or equivalent per week     Comment: only on weekends, none in 2 weeks   • Drug use: No   • Sexual activity: Defer           Objective   Physical Exam  Constitutional:       Appearance: He is well-developed. He is obese. He is not toxic-appearing.   HENT:      Head: Normocephalic and atraumatic.   Eyes:      Pupils: Pupils are equal, round, and reactive to light.   Cardiovascular:      Rate and Rhythm: Normal rate and regular rhythm.      Heart sounds: Normal heart sounds.   Pulmonary:      Effort: Pulmonary effort is normal.      Breath sounds: Normal breath sounds.   Chest:      Chest wall: Tenderness present. No lacerations or deformity.       Abdominal:      General: Bowel sounds are normal.      Palpations: Abdomen is soft.      Tenderness: There is abdominal tenderness in the left upper quadrant. There is left CVA tenderness. There is no right CVA tenderness.      Hernia: A hernia is present.      Comments: Left upper quadrant surgical scar from reported renal mass removed 5 to 6 years ago.   Musculoskeletal:         General: Normal range of motion.      Cervical back: Normal range of motion and neck supple.      Right lower leg: No edema.      Left lower leg: No edema.   Skin:     General: Skin is warm and dry.      Capillary Refill: Capillary refill takes less than 2 seconds.   Neurological:      General: No focal deficit present.      Mental Status: He is alert and oriented to person, place, and time.   Psychiatric:         Mood and Affect: Mood normal.         Behavior: Behavior normal.         Procedures           ED  Course  ED Course as of 12/25/24 2144   Wed Dec 25, 2024   1309 Patient's twelve-lead cardiac EKG independently interpreted by myself without acute concerning depression or elevation in the anterior leads. [JH]   1312 Initial evaluation of the patient in ED bed 30, accompanied by his daughter. [JH]   1400 CBC with normal limits. [JH]   1400   Plain film chest independent interpreted by myself without concerning acute process, consolidation, bony deformity, there is cardiomegaly as discussed by the radiologist. [JH]   1426 Initial serum chemistry nonactionable.  Awaiting troponin result. [JH]   1511 Urinalysis with normal normal limits. [JH]   1548 Troponin reported as negative, urinalysis negative, will reevaluate for symptoms improvement with the patient.  Recheck the troponin. [JH]   1652 Second cardiac troponin is flat. [JH]   1710 Reevaluated the patient, he reports after receiving the morphine, his chest pain as well as extremity pain resolved and has continued to be absent.  Awaiting CT imaging results. [JH]   1732 The imaging of the abdomen pelvis resulted with concern for left adrenal mass recurrence after previous adrenalectomy.  There is colonic diverticulosis without diverticulitis, and hepatic steatosis.  Will communicate these findings to the patient as well as the importance of follow-up with the urologist Dr. Medrano who performed the left adrenalectomy in October 2018. [JH]   1755 Patient, explaining test results importance of following up with cardiology as well as the urologist for the adrenal mass.  Patient and daughter endorsed understanding. [JH]      ED Course User Index  [JH] Anurag Norton, NELLI                                                       Medical Decision Making  Given the patient's history, including medication noncompliance, his symptoms of acute onset, as well as longer chronicity in the setting of previous renal mass removal, and my exam, differential cannot exclude acute coronary  syndrome, NSTEMI, atherosclerotic plaque event, abdominal wall hernia, abdominal mass, renal mass, acute versus chronic kidney injury.  Patient will have serum screening labs, urinalysis, twelve-lead cardiac EKG and ongoing cardiac monitoring, plain film imaging the chest, CT imaging abdomen pelvis with consideration for other advanced imaging.  Patient will give an oral dose of aspirin, as well as IV analgesia, reevaluate for improvement.  We will communicate all workup results and plan is disposition.  Patient is agreeable with this plan.  I will refer the patient in follow-up to the chest pain clinic at minimum.    Amount and/or Complexity of Data Reviewed  Labs: ordered.  Radiology: ordered.  ECG/medicine tests: ordered.    Risk  OTC drugs.  Prescription drug management.        Final diagnoses:   Nonspecific chest pain   Left adrenal mass   Hepatic steatosis   Diverticulosis       ED Disposition  ED Disposition       ED Disposition   Discharge    Condition   Stable    Comment   --               Miguel Angel Marquez DO  [address] [address]  [phone]      As needed    Junaid Boothe MD  [address] [facility]  [address]  [place] [address]  [phone]          Darisuz Medrano MD  [address] [address]  [phone]    Go to   Go to an appointment to discuss left adrenal mass recurrence    Ilan Rea APRN  [address] [facility]  [address]  [place] [address]  [phone]               Medication List        New Prescriptions      nitroglycerin 0.4 MG SL tablet  Commonly known as: NITROSTAT  Place 1 tablet under the tongue Every 5 (Five) Minutes As Needed for Chest Pain. Take no more than 3 doses in 15 minutes.               Where to Get Your Medications        These medications were sent to [facility] [address] [place] [address] - [address] [facility] [phone] [facility] [phone]   [address] [facility] [address]      Phone: [phone]    nitroglycerin 0.4 MG SL tablet            Anurag Norton, APRN  12/25/24 3858

## 2024-12-30 ENCOUNTER — PATIENT ROUNDING (BHMG ONLY) (OUTPATIENT)
Dept: CARDIOLOGY | Facility: HOSPITAL | Age: 54
End: 2024-12-30
Payer: COMMERCIAL

## 2024-12-30 ENCOUNTER — OFFICE VISIT (OUTPATIENT)
Dept: CARDIOLOGY | Facility: HOSPITAL | Age: 54
End: 2024-12-30
Payer: COMMERCIAL

## 2024-12-30 VITALS
OXYGEN SATURATION: 98 % | SYSTOLIC BLOOD PRESSURE: 145 MMHG | DIASTOLIC BLOOD PRESSURE: 104 MMHG | HEART RATE: 70 BPM | BODY MASS INDEX: 32.89 KG/M2 | WEIGHT: 217 LBS | HEIGHT: 68 IN

## 2024-12-30 DIAGNOSIS — I10 ESSENTIAL HYPERTENSION: Chronic | ICD-10-CM

## 2024-12-30 DIAGNOSIS — E78.2 MIXED HYPERLIPIDEMIA: Chronic | ICD-10-CM

## 2024-12-30 DIAGNOSIS — R07.89 OTHER CHEST PAIN: Primary | ICD-10-CM

## 2024-12-30 DIAGNOSIS — R06.09 DYSPNEA ON EXERTION: ICD-10-CM

## 2024-12-30 LAB
QT INTERVAL: 414 MS
QT INTERVAL: 440 MS
QTC INTERVAL: 447 MS
QTC INTERVAL: 450 MS

## 2024-12-30 RX ORDER — ASPIRIN 81 MG/1
81 TABLET ORAL DAILY
Start: 2024-12-30

## 2024-12-30 NOTE — PATIENT INSTRUCTIONS
Start to check blood pressure once per day using the upper arm cuff  Do so 2-3 hours after taking morning medication, and after sitting and resting for 10-15 minutes  Write down date, time, blood pressure, and pulse rate  Call if blood pressure is consistently higher than 140/90 while taking medications

## 2024-12-30 NOTE — PROGRESS NOTES
December 30, 2024    Hello, may I speak with Sandra Colon?    My name is Ligia      I am  with [facility] CARDIOLOGY  [address]  [facility] [address] [phone].    Before we get started may I verify your date of birth? 1970    I am calling to officially welcome you to our practice and ask about your recent visit. Is this a good time to talk? yes    Tell me about your visit with us. What things went well?  Everything was good.     How was your exprience with our rgistration process and staff? It was good. Quick.     Do we protect your privacy? Yes.     We're always looking for ways to make our patients' experiences even better. Do you have recommendations on ways we may improve?  no    Overall were you satisfied with your first visit to our practice? yes       I appreciate you taking the time to speak with me today. Is there anything else I can do for you? no      Thank you, and have a great day.

## 2024-12-30 NOTE — PROGRESS NOTES
"Chief Complaint  Chest Pain    Subjective      History of Present Illness {  Problem List  Visit  Diagnosis   Encounters  Notes  Medications  Labs  Result Review Imaging  Media :23}     Sandra Colon, 54 y.o. male with past medical history significant for adrenal tumor, diabetes, fatty liver, hypertension, and hyperlipidemia, who presents to [facility] for Chest Pain  Patient presented to [facility] ED on 12/25/2024 for chief complaint of chest pain.  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  Patient stated that after his discomfort his left arm felt somewhat numb followed by severe pain in bilateral lower extremities.  Patient admitted to missing doses of both blood pressure and cholesterol medications.  Twelve-lead EKG while in the ED showed normal sinus rhythm, rate 70, no acute ST segment changes.  Chest x-ray showed cardiomegaly without acute process.  CT abdomen and pelvis with contrast mentions the heart size is normal, no pericardial effusion, and coronary artery atherosclerotic calcification.  High-sensitivity troponin x 2, and BNP, noted to be unremarkable.  Patient was treated while in the ED with aspirin, nitroglycerin, and morphine.  He did endorse resolution of extremity pain as well as chest discomfort after receiving morphine.    At time of today's evaluation, patient denies additional episodes of chest pain.  In general, he denies any episodes of exertional chest pain or pressure.  He does have chest discomfort when breathing deeply only.  Patient notices recently he does have limiting dyspnea on exertion.  He denies any syncope, near syncope, or lower extremity edema.  Patient states it feels at times as though his heart is \"accelerating\".  He does not routinely check his blood pressure and heart rate at home, and has not been compliant with his daily medications prior to today's evaluation.  Recently, patient has " "eliminated caffeine from his diet.  He admits to minimal water intake.  He has no use of nicotine products, and denies any family history for early onset coronary artery disease.      Objective     Vital Signs:   Vitals:    12/30/24 0840 12/30/24 0841   BP: 131/95 (!) 145/104   BP Location: Left arm Left arm   Patient Position: Sitting Standing   Pulse: 65 70   SpO2: 98% 98%   Weight: 98.4 kg (217 lb) 98.4 kg (217 lb)   Height: 172.7 cm (68\") 172.7 cm (68\")     Body mass index is 32.99 kg/m².  Physical Exam  Vitals and nursing note reviewed.   Constitutional:       Appearance: Normal appearance.   HENT:      Head: Normocephalic.   Eyes:      Pupils: Pupils are equal, round, and reactive to light.   Cardiovascular:      Rate and Rhythm: Normal rate and regular rhythm.      Pulses: Normal pulses.      Heart sounds: Normal heart sounds. No murmur heard.  Pulmonary:      Effort: Pulmonary effort is normal.      Breath sounds: Normal breath sounds.   Abdominal:      General: Bowel sounds are normal.      Palpations: Abdomen is soft.   Musculoskeletal:         General: Normal range of motion.      Cervical back: Normal range of motion.      Right lower leg: No edema.      Left lower leg: No edema.   Skin:     General: Skin is warm and dry.      Capillary Refill: Capillary refill takes less than 2 seconds.   Neurological:      Mental Status: He is alert and oriented to person, place, and time.   Psychiatric:         Mood and Affect: Mood normal.         Thought Content: Thought content normal.                Data Reviewed:{ Labs  Result Review  Imaging  Med Tab  Media :23}   Lab Results   Component Value Date    WBC 7.05 12/25/2024    HGB 15.4 12/25/2024    HCT 42.1 12/25/2024    MCV 92.1 12/25/2024     12/25/2024      Lab Results   Component Value Date    GLUCOSE 145 (H) 12/25/2024    BUN 12 12/25/2024    CREATININE 0.68 (L) 12/25/2024     12/25/2024    K 4.2 12/25/2024     12/25/2024    CALCIUM 8.1 " (L) 12/25/2024    PROTEINTOT 6.4 12/25/2024    ALBUMIN 3.8 12/25/2024    ALT 55 (H) 12/25/2024    AST 27 12/25/2024    ALKPHOS 134 (H) 12/25/2024    BILITOT 0.2 12/25/2024    GLOB 2.6 12/25/2024    AGRATIO 1.5 12/25/2024    BCR 17.6 12/25/2024    ANIONGAP 13.0 12/25/2024    EGFR 109.8 12/25/2024      Lab Results   Component Value Date    TSH 1.940 01/17/2023      Lab Results   Component Value Date    CKTOTAL 230 (H) 03/26/2020    TROPONINT 6 12/25/2024      Lab Results   Component Value Date    CHOL 185 05/28/2024    TRIG 124 05/28/2024    HDL 30 (L) 05/28/2024     (H) 05/28/2024        Stress Test With Myocardial Perfusion (1 Day) (02/27/2017 13:51)  Adult Transthoracic Echo Complete (03/03/2017 09:32)  Duplex venous lower extremity bilateral CAR (11/14/2018 13:04)  ECG 12 Lead ED Triage Standing Order; Chest Pain (12/25/2024 13:03)  ECG 12 Lead ED Triage Standing Order; Chest Pain (12/25/2024 15:04)    Assessment & Plan   Assessment and Plan {CC Problem List  Visit Diagnosis  ROS  Review (Popup)  Health Maintenance  Quality  BestPractice  Medications  SmartSets  SnapShot Encounters  Media :23}     1. Other chest pain  -Recent evaluation in the ED reviewed and discussed today with patient including EKG, labs, chest x-ray, and CT scan.  Discussed with patient that the CT scan of the abdomen does mention moderate coronary artery calcifications  -Patient recommended to initiate 81 mg of aspirin in addition to atorvastatin for finding of coronary calcifications  -Due to finding of calcifications in addition to hypertension, hyperlipidemia, and diabetes, we will proceed with ischemic evaluation  -Plan to obtain nuclear medicine stress test to rule out ischemic causes  -Will also obtain echocardiogram to rule out structural or functional abnormalities  - aspirin 81 MG EC tablet; Take 1 tablet by mouth Daily.  - Stress Test With Myocardial Perfusion (1 Day); Future  - Adult Transthoracic Echo Complete  W/ Cont if Necessary Per Protocol; Future    2. Dyspnea on exertion  -Patient expresses concern with new onset dyspnea on exertion  -In combination with recent episode of chest pain, will consider this as an anginal equivalent.  Plan to proceed with ischemic evaluation in form of nuclear medicine stress testing, and echocardiogram    3. Essential hypertension  -Blood pressure poorly controlled during today's evaluation.  Patient states he has not had his morning medication at time of today's evaluation  -Discussed importance of medication compliance today with patient.  Patient is to begin taking his blood pressure medication consistently every day  -Also recommend patient to participate in the DASH diet  -Patient will also begin to check his blood pressure once daily utilizing an upper arm blood pressure cuff.  He will document date, time, blood pressure, and pulse rate.  He is aware his goal blood pressure is consistently less than 140/90  -For now, would recommend to continue benazepril-hydrochlorothiazide at 5-6.5 mg daily as we are unable to assess for medications efficacy while patient is not taking this    4. Mixed hyperlipidemia  -Lipid panel currently managed by primary care.  Patient states he has not been taking his atorvastatin consistently.  Again, we had a discussion regarding importance of medication compliance.  Patient is aware his CT of the abdomen mention coronary calcifications and that his atorvastatin as part of medical management for this finding      Plan to follow-up with patient approximately 3 weeks to discuss results of above-mentioned testing, and ambulatory blood pressure log.  Patient may feel free to reach out prior to that time if his symptoms change or worsen.    Follow Up {Instructions Charge Capture  Follow-up Communications :23}     Return in about 22 days (around 1/21/2025) for Video visit, Chest pain, Result review.      Patient was given instructions and counseling  regarding his condition or for health maintenance advice. Please see specific information pulled into the AVS if appropriate.  Patient was instructed to call the [facility] with any questions, concerns, or worsening symptoms.    Dictated Utilizing Dragon Dictation   Please note that portions of this note were completed with a voice recognition program.   Part of this note may be an electronic transcription/translation of spoken language to printed text using the Dragon Dictation System.

## 2024-12-31 ENCOUNTER — TELEPHONE (OUTPATIENT)
Dept: FAMILY MEDICINE CLINIC | Facility: CLINIC | Age: 54
End: 2024-12-31
Payer: COMMERCIAL

## 2024-12-31 DIAGNOSIS — Z86.018 H/O PHEOCHROMOCYTOMA: Primary | ICD-10-CM

## 2024-12-31 DIAGNOSIS — D35.00 PHEOCHROMOCYTOMA, UNSPECIFIED LATERALITY: ICD-10-CM

## 2024-12-31 NOTE — TELEPHONE ENCOUNTER
Called and LMOVM, also sent follow up text via my cell. Looks like he was referred to urology via ER last week.

## 2024-12-31 NOTE — TELEPHONE ENCOUNTER
DR. ORA RACHEL'S OFFICE CALLED. DR RACHEL WOULD LIKE DR MUSTAFA TO CALL HIS CELL PHONE REGARDING THIS PATIENT.    CELL: [phone]

## 2025-01-02 ENCOUNTER — HOSPITAL ENCOUNTER (OUTPATIENT)
Dept: CARDIOLOGY | Facility: HOSPITAL | Age: 55
Discharge: HOME | End: 2025-01-02
Payer: COMMERCIAL

## 2025-01-02 VITALS
HEIGHT: 68 IN | WEIGHT: 216.93 LBS | HEART RATE: 58 BPM | BODY MASS INDEX: 32.88 KG/M2 | DIASTOLIC BLOOD PRESSURE: 98 MMHG | SYSTOLIC BLOOD PRESSURE: 144 MMHG

## 2025-01-02 VITALS — HEIGHT: 68 IN | WEIGHT: 216.93 LBS | BODY MASS INDEX: 32.88 KG/M2

## 2025-01-02 DIAGNOSIS — R07.89 OTHER CHEST PAIN: ICD-10-CM

## 2025-01-02 LAB
ASCENDING AORTA: 3.9 CM
AV MEAN PRESS GRAD SYS DOP V1V2: 2.1 MMHG
AV VMAX SYS DOP: 101.4 CM/SEC
BH CV ECHO MEAS - AO MAX PG: 4.1 MMHG
BH CV ECHO MEAS - AO ROOT AREA (BSA CORRECTED): 2 CM2
BH CV ECHO MEAS - AO ROOT DIAM: 4.3 CM
BH CV ECHO MEAS - AO V2 VTI: 24.3 CM
BH CV ECHO MEAS - IVS/LVPW: 1 CM
BH CV ECHO MEAS - IVSD: 1.2 CM
BH CV ECHO MEAS - LA DIMENSION: 4 CM
BH CV ECHO MEAS - LAT PEAK E' VEL: 9.4 CM/SEC
BH CV ECHO MEAS - LV MAX PG: 3.3 MMHG
BH CV ECHO MEAS - LV MEAN PG: 1.6 MMHG
BH CV ECHO MEAS - LV V1 MAX: 90.2 CM/SEC
BH CV ECHO MEAS - LV V1 VTI: 19.9 CM
BH CV ECHO MEAS - LVIDD: 4.6 CM
BH CV ECHO MEAS - LVIDS: 2.9 CM
BH CV ECHO MEAS - LVOT DIAM: 2 CM
BH CV ECHO MEAS - LVPWD: 1.2 CM
BH CV ECHO MEAS - MED PEAK E' VEL: 5.5 CM/SEC
BH CV ECHO MEAS - MV A MAX VEL: 43.1 CM/SEC
BH CV ECHO MEAS - MV DEC SLOPE: 222.4 CM/SEC2
BH CV ECHO MEAS - MV E MAX VEL: 48.2 CM/SEC
BH CV ECHO MEAS - MV E/A: 1.12
BH CV ECHO MEAS - MV MAX PG: 1.41 MMHG
BH CV ECHO MEAS - MV MEAN PG: 0.5 MMHG
BH CV ECHO MEAS - MV V2 VTI: 21.9 CM
BH CV ECHO MEAS - PA ACC TIME: 0.14 SEC
BH CV ECHO MEAS - PI END-D VEL: 102.7 CM/SEC
BH CV ECHO MEAS - RAP SYSTOLE: 3 MMHG
BH CV ECHO MEAS - RVSP: 18 MMHG
BH CV ECHO MEAS - TAPSE (>1.6): 2.34 CM
BH CV ECHO MEAS - TR MAX PG: 15.2 MMHG
BH CV ECHO MEAS - TR MAX VEL: 195.2 CM/SEC
BH CV ECHO MEASUREMENTS AVERAGE E/E' RATIO: 6.47
BH CV REST NUCLEAR ISOTOPE DOSE: 9 MCI
BH CV STRESS BP STAGE 1: NORMAL
BH CV STRESS BP STAGE 2: NORMAL
BH CV STRESS BP STAGE 3: NORMAL
BH CV STRESS DURATION MIN STAGE 1: 3
BH CV STRESS DURATION MIN STAGE 2: 3
BH CV STRESS DURATION MIN STAGE 3: 3
BH CV STRESS DURATION SEC STAGE 1: 0
BH CV STRESS DURATION SEC STAGE 2: 0
BH CV STRESS DURATION SEC STAGE 3: 5
BH CV STRESS GRADE STAGE 1: 10
BH CV STRESS GRADE STAGE 2: 12
BH CV STRESS GRADE STAGE 3: 14
BH CV STRESS HR STAGE 1: 100
BH CV STRESS HR STAGE 2: 123
BH CV STRESS HR STAGE 3: 142
BH CV STRESS METS STAGE 1: 5
BH CV STRESS METS STAGE 2: 7.5
BH CV STRESS METS STAGE 3: 10
BH CV STRESS NUCLEAR ISOTOPE DOSE: 33 MCI
BH CV STRESS O2 STAGE 1: 98
BH CV STRESS O2 STAGE 2: 99
BH CV STRESS O2 STAGE 3: 98
BH CV STRESS PROTOCOL 1: NORMAL
BH CV STRESS RECOVERY BP: NORMAL MMHG
BH CV STRESS RECOVERY HR: 77 BPM
BH CV STRESS RECOVERY O2: 99 %
BH CV STRESS SPEED STAGE 1: 1.7
BH CV STRESS SPEED STAGE 2: 2.5
BH CV STRESS SPEED STAGE 3: 3.4
BH CV STRESS STAGE 1: 1
BH CV STRESS STAGE 2: 2
BH CV STRESS STAGE 3: 3
BH CV VAS BP RIGHT ARM: NORMAL MMHG
BH CV XLRA - RV BASE: 4.5 CM
BH CV XLRA - RV LENGTH: 9 CM
BH CV XLRA - RV MID: 3.5 CM
BH CV XLRA - TDI S': 17.7 CM/SEC
LEFT ATRIUM VOLUME INDEX: 20.1 ML/M2
LV EF BIPLANE MOD: 58.9 %
MAXIMAL PREDICTED HEART RATE: 165 BPM
PERCENT MAX PREDICTED HR: 87.27 %
SPECT HRT GATED+EF W RNC IV: 70 %
STRESS BASELINE BP: NORMAL MMHG
STRESS BASELINE HR: 58 BPM
STRESS O2 SAT REST: 100 %
STRESS PERCENT HR: 103 %
STRESS POST ESTIMATED WORKLOAD: 10.1 METS
STRESS POST EXERCISE DUR MIN: 9 MIN
STRESS POST EXERCISE DUR SEC: 5 SEC
STRESS POST O2 SAT PEAK: 98 %
STRESS POST PEAK BP: NORMAL MMHG
STRESS POST PEAK HR: 144 BPM
STRESS TARGET HR: 140 BPM

## 2025-01-02 PROCEDURE — 93306 TTE W/DOPPLER COMPLETE: CPT

## 2025-01-02 PROCEDURE — 78452 HT MUSCLE IMAGE SPECT MULT: CPT

## 2025-01-02 PROCEDURE — A9500 TC99M SESTAMIBI: HCPCS | Performed by: NURSE PRACTITIONER

## 2025-01-02 PROCEDURE — 34310000005 TECHNETIUM SESTAMIBI: Performed by: NURSE PRACTITIONER

## 2025-01-02 PROCEDURE — 93017 CV STRESS TEST TRACING ONLY: CPT

## 2025-01-02 RX ADMIN — TECHNETIUM TC 99M SESTAMIBI 1 DOSE: 1 INJECTION INTRAVENOUS at 08:30

## 2025-01-02 RX ADMIN — TECHNETIUM TC 99M SESTAMIBI 1 DOSE: 1 INJECTION INTRAVENOUS at 10:10

## 2025-01-02 NOTE — PROGRESS NOTES
Your echocardiogram is within acceptable limits.  Your heart contracts normally.  There are no significant valvular abnormalities noted.  The mild dilation of the aortic root is not an emergent finding, but is something we will discuss in further detail at our next office visit.  Please continue to monitor your blood pressure as discussed and we will review your blood pressure readings as well.      Sincerely,    Abigail AIKEN

## 2025-01-02 NOTE — PROGRESS NOTES
Your stress test was low risk and showed no evidence of ischemia/significant heart blockage.  We will plan to discuss these results in further detail at the time of our next office visit.

## 2025-01-21 ENCOUNTER — TELEPHONE (OUTPATIENT)
Dept: ENDOCRINOLOGY | Facility: CLINIC | Age: 55
End: 2025-01-21

## 2025-01-21 NOTE — TELEPHONE ENCOUNTER
PT CAME INTO THE OFFICE TO CHECK APPT DATE. HE REQUESTED TO GET IN SOONER. HE STATED HE HAS A TUMOR ON HIS ADRENAL GLAND AND WAS INSTRUCTED TO SEE US ASAP. NO OPENINGS.

## 2025-01-22 ENCOUNTER — OFFICE VISIT (OUTPATIENT)
Dept: ENDOCRINOLOGY | Facility: CLINIC | Age: 55
End: 2025-01-22
Payer: COMMERCIAL

## 2025-01-22 VITALS
HEIGHT: 68 IN | DIASTOLIC BLOOD PRESSURE: 92 MMHG | WEIGHT: 222.2 LBS | RESPIRATION RATE: 18 BRPM | SYSTOLIC BLOOD PRESSURE: 146 MMHG | HEART RATE: 81 BPM | BODY MASS INDEX: 33.68 KG/M2 | OXYGEN SATURATION: 97 %

## 2025-01-22 DIAGNOSIS — I10 ESSENTIAL HYPERTENSION: Chronic | ICD-10-CM

## 2025-01-22 DIAGNOSIS — E11.9 TYPE 2 DIABETES MELLITUS WITHOUT COMPLICATION, WITHOUT LONG-TERM CURRENT USE OF INSULIN: ICD-10-CM

## 2025-01-22 DIAGNOSIS — D35.02 PHEOCHROMOCYTOMA OF LEFT ADRENAL GLAND: Primary | ICD-10-CM

## 2025-01-22 LAB
DEPRECATED RDW RBC AUTO: 43 FL (ref 37–54)
ERYTHROCYTE [DISTWIDTH] IN BLOOD BY AUTOMATED COUNT: 12.7 % (ref 12.3–15.4)
EXPIRATION DATE: ABNORMAL
EXPIRATION DATE: ABNORMAL
GLUCOSE BLDC GLUCOMTR-MCNC: 377 MG/DL (ref 70–130)
HBA1C MFR BLD: 8.1 % (ref 4.5–5.7)
HCT VFR BLD AUTO: 45.3 % (ref 37.5–51)
HGB BLD-MCNC: 15.8 G/DL (ref 13–17.7)
Lab: ABNORMAL
Lab: ABNORMAL
MCH RBC QN AUTO: 32.6 PG (ref 26.6–33)
MCHC RBC AUTO-ENTMCNC: 34.9 G/DL (ref 31.5–35.7)
MCV RBC AUTO: 93.4 FL (ref 79–97)
PLATELET # BLD AUTO: 223 10*3/MM3 (ref 140–450)
PMV BLD AUTO: 11.1 FL (ref 6–12)
RBC # BLD AUTO: 4.85 10*6/MM3 (ref 4.14–5.8)
WBC NRBC COR # BLD AUTO: 6.47 10*3/MM3 (ref 3.4–10.8)

## 2025-01-22 PROCEDURE — 83036 HEMOGLOBIN GLYCOSYLATED A1C: CPT | Performed by: INTERNAL MEDICINE

## 2025-01-22 PROCEDURE — 80061 LIPID PANEL: CPT | Performed by: INTERNAL MEDICINE

## 2025-01-22 PROCEDURE — 82947 ASSAY GLUCOSE BLOOD QUANT: CPT | Performed by: INTERNAL MEDICINE

## 2025-01-22 PROCEDURE — 82607 VITAMIN B-12: CPT | Performed by: INTERNAL MEDICINE

## 2025-01-22 PROCEDURE — 99204 OFFICE O/P NEW MOD 45 MIN: CPT | Performed by: INTERNAL MEDICINE

## 2025-01-22 PROCEDURE — 83835 ASSAY OF METANEPHRINES: CPT | Performed by: INTERNAL MEDICINE

## 2025-01-22 PROCEDURE — 80050 GENERAL HEALTH PANEL: CPT | Performed by: INTERNAL MEDICINE

## 2025-01-22 RX ORDER — GLIMEPIRIDE 2 MG/1
2 TABLET ORAL
Qty: 90 TABLET | Refills: 1 | Status: SHIPPED | OUTPATIENT
Start: 2025-01-22

## 2025-01-22 RX ORDER — DOXAZOSIN 2 MG/1
2 TABLET ORAL NIGHTLY
Qty: 90 TABLET | Refills: 1 | Status: SHIPPED | OUTPATIENT
Start: 2025-01-22

## 2025-01-23 LAB
ALBUMIN SERPL-MCNC: 3.9 G/DL (ref 3.5–5.2)
ALBUMIN/GLOB SERPL: 1.1 G/DL
ALP SERPL-CCNC: 154 U/L (ref 39–117)
ALT SERPL W P-5'-P-CCNC: 73 U/L (ref 1–41)
ANION GAP SERPL CALCULATED.3IONS-SCNC: 12.1 MMOL/L (ref 5–15)
AST SERPL-CCNC: 36 U/L (ref 1–40)
BILIRUB SERPL-MCNC: 0.3 MG/DL (ref 0–1.2)
BUN SERPL-MCNC: 11 MG/DL (ref 6–20)
BUN/CREAT SERPL: 11.3 (ref 7–25)
CALCIUM SPEC-SCNC: 9.2 MG/DL (ref 8.6–10.5)
CHLORIDE SERPL-SCNC: 100 MMOL/L (ref 98–107)
CHOLEST SERPL-MCNC: 146 MG/DL (ref 0–200)
CO2 SERPL-SCNC: 24.9 MMOL/L (ref 22–29)
CREAT SERPL-MCNC: 0.97 MG/DL (ref 0.76–1.27)
EGFRCR SERPLBLD CKD-EPI 2021: 91.6 ML/MIN/1.73
GLOBULIN UR ELPH-MCNC: 3.5 GM/DL
GLUCOSE SERPL-MCNC: 281 MG/DL (ref 65–99)
HDLC SERPL-MCNC: 30 MG/DL (ref 40–60)
LDLC SERPL CALC-MCNC: 78 MG/DL (ref 0–100)
LDLC/HDLC SERPL: 2.36 {RATIO}
POTASSIUM SERPL-SCNC: 3.8 MMOL/L (ref 3.5–5.2)
PROT SERPL-MCNC: 7.4 G/DL (ref 6–8.5)
SODIUM SERPL-SCNC: 137 MMOL/L (ref 136–145)
TRIGL SERPL-MCNC: 226 MG/DL (ref 0–150)
TSH SERPL DL<=0.05 MIU/L-ACNC: 1.57 UIU/ML (ref 0.27–4.2)
VIT B12 BLD-MCNC: 530 PG/ML (ref 211–946)
VLDLC SERPL-MCNC: 38 MG/DL (ref 5–40)

## 2025-01-30 LAB
METANEPH FREE SERPL-MCNC: <25 PG/ML (ref 0–88)
NORMETANEPHRINE SERPL-MCNC: 607.8 PG/ML (ref 0–244)

## 2025-02-07 PROBLEM — D35.02 PHEOCHROMOCYTOMA OF LEFT ADRENAL GLAND: Status: ACTIVE | Noted: 2017-10-15

## 2025-02-07 PROBLEM — C74.92: Status: ACTIVE | Noted: 2017-10-15

## 2025-02-07 NOTE — PROGRESS NOTES
"Chief Complaint   Patient presents with    Diabetes    pheochromocytoma     Last visit 01/12/2022       HPI:   Sandra Colon is a 55 y.o.male who returns to Endocrine Clinic for f/u evaluation of Type 2 DM, hyperlipidemia, and new finding of recurrent pheochromocytoma. Last visit 01/12/2022.  His history is as follows:    Interim Events:   -Patient reports after his last visit with me in January 2022, he was only seeing his primary care provider.  There was no follow-up evaluation of his pheochromocytoma by any other provider.  In December 25, 2024 he presented to [facility] ED reporting left-sided chest pain and abdominal pain.  During that evaluation he was found to have a 2.6 x 3.0 centimeter recurrent mass in the adrenal bed.  He was referred by the [facility] ED to his urologist Dr. Dariusz Medrano at [facility] who saw him on 12/31/2024.  Patient was advised by his urologist to see his endocrinologist for further evaluation prior to surgical intervention.   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 As part of his evaluation he had a colonoscopy and an ultrasound of the abdomen completed at [facility] radiology on 8/29/17.  The ultrasound report had described a left kidney nodule that was later confirmed to be a left adrenal nodule on CT scan of the abdomen:   \"The right adrenal gland is normal. There is a low dense mass in the left adrenal gland measuring 3.0 x 4.3 cm. On the unenhanced image the adrenal mass has an attenuation coefficient of 28.5 Hounsfield units. The arterial phase demonstrates an attenuation coefficient of 33.2 Hounsfield units. The attenuation on venous imaging is 29.8 Hounsfield units and on delayed imaging 35.18 Hounsfield " "units. The mass demonstrates a thin rim of contrast enhancement primarily posteriorly.\"     Initial Endocrine Visit: (10/2017)  Lab evaluation at that time showed: normal plasma metanephrines  Normetanephrine 0 - 145 pg/mL 101    Metanephrine 0 - 62 pg/mL <10    - 8AM renin level of 0.186, and aldosterone of 13.1, normal renal function and K+   Pt could not complete a 1 mg overnight dexamethasone suppression test or repeat aldosterone/renin as planned     - 05/30/2018: presented to the  ED on  with cough and abdominal pain. CT scan of the Abdomen showed again the left adrenal nodule that had increased in size since 2017 and a new finding of a left renal mass that was not present on prior imaging. Impression: \" 5.1 x 3.3 cm septated hypoattenuating left adrenal mass, enlarged from comparison study. Findings support primary adrenal neoplasm such as pheochromocytoma. Recommend further evaluation\" and  \"3.4 cm hypoattenuating lesion within the upper pole of the left kidney,  best visualized on the delayed images (series 6, image 38), new from comparison study. Renal carcinoma possible. Recommend further evaluation.\"     - 06/2018:  pt then presented to the  hospital again with episode of gross hematuria. He was admitted for further evaluation.   The inpatient provider ordered a CT guided FNA of the left adrenal nodule (06/12/2018) - pathology report: \"blood and fragments of adrenal gland, no tumor seen\" In addition he was noted to have uncontrolled HTN and a new diagnosis of uncontrolled diabetes.      - 07/20/2018: Dr. Dariusz Medrano, Urology, had pt completed the following urine studies: [facility]  24 hr Urine Free Cortisol:  18.7 (4.0 - 50.0) - normal  24 hr Urine Creatinine: 1.98 (0.63 - 2.50) - normal  24 hr Urine Fractionated Metanephrines:  * Metanephrine: 114 (58 - 203) - normal  * Normetanephrine: 987 (88 - 649) - abnormal  * Total metanephrines 1101 (182 - 739)  24 hr Urine VMA: 3.7 ( < / = 6.0)     - " "08/13/2018: Endocrine Follow-up visit:  Fractionated plasma metanephrines and other biochemical testing collected after visit: Confirmed that patient had not taken any medications to cause a false positive results (verfied no Tylenol).   08/14/2018 (8:04 AM):   Normetanephrine 373 (0 - 145), Metanephrine 22 (0 - 62)  Aldosterone 9.8  Renin 0.262 (0.167 - 5.380)  AM Cortisol (1 mg dex suppression test): 0.70, Appropriately suppressed to <1.8 after 1 mg overnight dexamethasone suppression test  08/20/2018 (8:40 AM): normetanephrine 469 (0 - 145), metanephrine 29 (0 - 62)    - Discussed case with Dr. Medrano, Urology and started pt on alpha blockade 2 weeks prior to surgery.         - 10/22/2018:  pt underwent left adrenalectomy on 10/22/2018 by Dr. Dariusz Medrano. Intraoperative use of US with renal exploration was completed. No renal lesions were seen.     Surgical pathology: pheochromocytoma and nodular adrenocortical hyperplasia.   \"Microscopic Description: Sections reveal a neoplasm within the medullary area area of the adrenal gland composed of polygonal shaped cells with moderate to abundant granular faintly blue/purple cytoplasm. Intranuclear inclusions are noted. Other areas show more diffuse and solid arrangements of tumor cells. Nuclear hyperchromasia and pleomorphism is noted prominently. Scattered mitoses with up to 3 Immunohistochemical stains are performed and reported as follows: Chromogranin A +, GATA3 +, Calretinin (-) in cells of interest, Inhibin (-) in cells of interest, Melan-A  (-) in cells of interest. The overall findings ae those of pheochromocytoma. Additionally, there is a nodule composed of adrenocortical cells, c/w nodular adrenocortical hyperplasia.\"    (03/2019) post-op plasma metanephrines normal  I was unable to complete genetic testing at that time due to cost to patient.   - pt was counseled multiple times that he will need lifelong follow-up due to possibility of recurrence.  Patient's " "last visit with me was 2022.     Interim Events:   -Patient reports after his last visit with me in 2022, he was only seeing his primary care provider.  There was no follow-up evaluation of his pheochromocytoma by any other provider.    0 In 2024 he presented to [facility] ED reporting left-sided chest pain and abdominal pain.  During that evaluation he was found to have a 2.6 x 3.0 centimeter recurrent mass in the adrenal bed.  He was referred by the [facility] ED to his urologist Dr. Dariusz Medrano at [facility] who saw him on 2024.  Patient was advised by his urologist to see his endocrinologist for further evaluation prior to surgical intervention.      (2025)  Rad CT Abd/Pelvis w/ constrast: \"The right adrenal gland appears normal. There are postsurgical changes of prior left adrenalectomy. There is a new soft tissue mass within the left adrenal bed measuring 2.6 x 3.0 cm best seen on image 64 of series 2. This is suspicious for disease recurrence.\"    In further review, pt reports taking benazepril/HCTZ 5mg-6.25 mg once daily.  He states his blood pressures in the 130s to 150s systolic/80s to 90s diastolic.  He reports left-sided upper quadrant pain intermittently and daily headaches in the morning.  He has had occasional episodes of diaphoresis and only 1 episode of tachycardia which occurred on 2025 when he presented to the ER    2) Type 2 diabetes with no known associated complications at this time:  - diagnosed in 2018 - A1C% 11.90. Prescribed 3 oral agents by his PCP at that time  - pt stopped his medications post-op in 10/2018  - I restarted the medications in 2018, but patient stopped the medications again  - reports the Janumet gave him headaches  - did not tolerate Synjardy  - restarted metformin in 2020    Current DM Medications:   Metformin  m tabs daily prescribed by his PCP    Glucometer Review: no meter " "for review    DM Health Maintenance:  Ophtho: Last visit - due  Podiatry: Last visit - N/A  Monofilament / Foot exam: due  Lipids: (01/2025) Tchol 146, , HDL 30, LDL 78 - on atorvastatin 40 mg  Urine Microalb/Cr ratio: (08/2021) normal at 5.2  CMP: (01/2025) Cr 0.97, GFR 91.6, AST 36, ALT 73  CBC: (01/2025) WNL, Hgb 15.8  TSH: (01/2025) - normal at 1.570  Vit B12: (01/2025) 530    FMHx: He has a family history of diabetes but reports no known family history of hypertension     3) essential HTN:   - HTN diagnosed in his early 40's  - pt had been off all antihypertensive medications post-operatively 10/2018  - In 08/2021, started benazepril/HCTZ 5-6.25 mg daily.  - Pt currently taking benazepril/HCTZ 5-6.25 mg daily with good compliance.    4) mixed hyperlipidemia:   - was taking atorvastatin 40 mg daily. Has not been taking recently  - 2019 Abd US showed fatty liver    Review of Systems   Constitutional:  Positive for fatigue.        Weight loss   Eyes: Negative.  Negative for itching.   Respiratory: Negative.     Cardiovascular:  Positive for leg swelling. Negative for palpitations.   Gastrointestinal:  Positive for abdominal pain (LUQ).   Endocrine: Negative for polyuria.   Genitourinary: Negative.  Negative for frequency.   Musculoskeletal: Negative.  Negative for back pain.   Skin: Negative.    Allergic/Immunologic: Negative.    Neurological:  Positive for headaches (AM).   Hematological: Negative.    Psychiatric/Behavioral: Negative.         The following portions of the patient's history were reviewed and updated as appropriate: allergies, current medications, past family history, past medical history, past social history, past surgical history and problem list.    Vitals:    01/22/25 1221   BP: 146/92   Pulse: 81   Resp: 18   SpO2: 97%   Weight: 101 kg (222 lb 3.2 oz)   Height: 172.7 cm (67.99\")     Physical Exam   Constitutional: He is oriented to person, place, and time. He appears well-developed. No " distress.   overweight   HENT:   Head: Normocephalic.   Eyes: Pupils are equal, round, and reactive to light. Conjunctivae are normal.   Neck: No tracheal deviation present. No thyromegaly present.   No palpable thyroid nodules     Cardiovascular: Normal rate, regular rhythm and normal heart sounds.   No murmur heard.  Pulmonary/Chest: Effort normal and breath sounds normal. No respiratory distress.   Abdominal: Soft. Bowel sounds are normal. He exhibits no mass. There is no abdominal tenderness.   Surgical scar well healed   Lymphadenopathy:     He has no cervical adenopathy.   Neurological: He is alert and oriented to person, place, and time. No cranial nerve deficit.   Skin: Skin is warm and dry. He is not diaphoretic. No erythema.   Acanthosis nigricans   Psychiatric: His behavior is normal.   Vitals reviewed.    LABS/IMAGING: outside records reviewed and summarized in HPI  Office Visit on 01/22/2025   Component Date Value Ref Range Status    Hemoglobin A1C 01/22/2025 8.1 (A)  4.5 - 5.7 % Final    Lot Number 01/22/2025 [id number]   Final    Expiration Date 01/22/2025 10/04/2026   Final    Glucose 01/22/2025 377 (A)  70 - 130 mg/dL Final    Lot Number 01/22/2025 [id number]   Final    Expiration Date 01/22/2025 8/09/2025   Final    Normetanephrine 01/22/2025 607.8 (H)  0.0 - 244.0 pg/mL Final    Metanephrine 01/22/2025 <25.0  0.0 - 88.0 pg/mL Final    WBC 01/22/2025 6.47  3.40 - 10.80 10*3/mm3 Final    RBC 01/22/2025 4.85  4.14 - 5.80 10*6/mm3 Final    Hemoglobin 01/22/2025 15.8  13.0 - 17.7 g/dL Final    Hematocrit 01/22/2025 45.3  37.5 - 51.0 % Final    MCV 01/22/2025 93.4  79.0 - 97.0 fL Final    MCH 01/22/2025 32.6  26.6 - 33.0 pg Final    MCHC 01/22/2025 34.9  31.5 - 35.7 g/dL Final    RDW 01/22/2025 12.7  12.3 - 15.4 % Final    RDW-SD 01/22/2025 43.0  37.0 - 54.0 fl Final    MPV 01/22/2025 11.1  6.0 - 12.0 fL Final    Platelets 01/22/2025 223  140 - 450 10*3/mm3 Final    Glucose 01/22/2025 281 (H)  65 - 99  mg/dL Final    BUN 01/22/2025 11  6 - 20 mg/dL Final    Creatinine 01/22/2025 0.97  0.76 - 1.27 mg/dL Final    Sodium 01/22/2025 137  136 - 145 mmol/L Final    Potassium 01/22/2025 3.8  3.5 - 5.2 mmol/L Final    Chloride 01/22/2025 100  98 - 107 mmol/L Final    CO2 01/22/2025 24.9  22.0 - 29.0 mmol/L Final    Calcium 01/22/2025 9.2  8.6 - 10.5 mg/dL Final    Total Protein 01/22/2025 7.4  6.0 - 8.5 g/dL Final    Albumin 01/22/2025 3.9  3.5 - 5.2 g/dL Final    ALT (SGPT) 01/22/2025 73 (H)  1 - 41 U/L Final    AST (SGOT) 01/22/2025 36  1 - 40 U/L Final    Alkaline Phosphatase 01/22/2025 154 (H)  39 - 117 U/L Final    Total Bilirubin 01/22/2025 0.3  0.0 - 1.2 mg/dL Final    Globulin 01/22/2025 3.5  gm/dL Final    A/G Ratio 01/22/2025 1.1  g/dL Final    BUN/Creatinine Ratio 01/22/2025 11.3  7.0 - 25.0 Final    Anion Gap 01/22/2025 12.1  5.0 - 15.0 mmol/L Final    eGFR 01/22/2025 91.6  >60.0 mL/min/1.73 Final    Total Cholesterol 01/22/2025 146  0 - 200 mg/dL Final    Triglycerides 01/22/2025 226 (H)  0 - 150 mg/dL Final    HDL Cholesterol 01/22/2025 30 (L)  40 - 60 mg/dL Final    LDL Cholesterol  01/22/2025 78  0 - 100 mg/dL Final    VLDL Cholesterol 01/22/2025 38  5 - 40 mg/dL Final    LDL/HDL Ratio 01/22/2025 2.36   Final    TSH 01/22/2025 1.570  0.270 - 4.200 uIU/mL Final    Vitamin B-12 01/22/2025 530  211 - 946 pg/mL Final       ASSESSMENT/PLAN:    1) left pheochromocytoma: recurrence of disease in left adrenal bed.   - Discussed my concerns with patient that he underwent a CT-guided FNA of his left adrenal tumor in June 2018 by radiology during his hospitalization at [facility] for hematuria which may have led to tumor seeding prior to his resection on 10/22/2018 by urology.  I also discussed my concerns for possible malignant pheochromocytoma.  -Fractionated plasma metanephrines today showed an elevated normetanephrine level of 608  -I am ordering a genetic pheochromocytoma evaluation (ret, VHL, SDHB panel)  through [facility].  I will be using the patient assistance program provided by [employer].   -Due to the complexity of his case, I recommended that he be evaluated by endocrine surgery at a tertiary center.  I am referring him to  endocrine surgery for further evaluation at this time.  Will contact Dr. Salinas to coordinate any imaging request prior to the visit.    -For alpha blockade I have started doxazosin 2 mg nightly and have continue the benazepril HCTZ 5/6.25 mg daily every morning.  Patient is to keep a blood pressure log and contact me for any worsening symptoms of headache, high blood pressure, tachycardia.    2) Type 2 diabetes with no known associated complications at this time: uncontrolled with hyperglycemia, A1C% 8.1 today.   - Continue metformin  mg tabs: Patient to take 2 tabs every morning  - Adding glimepiride 2 mg every morning.  Reviewed risk of hypoglycemia on this medication    DM health maintenance labs completed 08/2021 reviewed    3) mixed hyperlipidemia:  - continue atorvastatin 40 mg daily  - CMP, lipid panel completed today  - elevated LFT's improving. H/o fatty liver.  - Advised pt to avoid alcohol intake.     Follow-up visit will be schedule after his endocrine surgery consultation appointment has been determined    Electronically Signed: Roma Abbott MD

## 2025-04-18 DIAGNOSIS — I10 ESSENTIAL HYPERTENSION: ICD-10-CM

## 2025-04-18 DIAGNOSIS — D35.02 PHEOCHROMOCYTOMA OF LEFT ADRENAL GLAND: ICD-10-CM

## 2025-04-18 RX ORDER — DOXAZOSIN 2 MG/1
2 TABLET ORAL NIGHTLY
Qty: 90 TABLET | Refills: 1 | Status: SHIPPED | OUTPATIENT
Start: 2025-04-18

## 2025-04-18 RX ORDER — BENAZEPRIL HYDROCHLORIDE AND HYDROCHLOROTHIAZIDE 5; 6.25 MG/1; MG/1
1 TABLET ORAL DAILY
Qty: 90 TABLET | Refills: 3 | Status: SHIPPED | OUTPATIENT
Start: 2025-04-18

## 2025-04-19 DIAGNOSIS — E11.9 TYPE 2 DIABETES MELLITUS WITHOUT COMPLICATION, WITHOUT LONG-TERM CURRENT USE OF INSULIN: ICD-10-CM

## 2025-04-21 RX ORDER — METFORMIN HYDROCHLORIDE 500 MG/1
TABLET, EXTENDED RELEASE ORAL
Qty: 180 TABLET | Refills: 0 | Status: SHIPPED | OUTPATIENT
Start: 2025-04-21

## 2025-04-21 NOTE — TELEPHONE ENCOUNTER
Rx Refill Note  Requested Prescriptions     Pending Prescriptions Disp Refills    metFORMIN ER (GLUCOPHAGE-XR) 500 MG 24 hr tablet [Pharmacy Med Name: metFORMIN HCl  MG Oral Tablet Extended Release 24 Hour] 180 tablet 0     Sig: TAKE 1 TABLET BY MOUTH DAILY WITH BREAKFAST AND WITH SUPPER      Last office visit with prescribing clinician: 8/29/2024   Last telemedicine visit with prescribing clinician: Visit date not found   Next office visit with prescribing clinician: Visit date not found                         Would you like a call back once the refill request has been completed: [] Yes [] No    If the office needs to give you a call back, can they leave a voicemail: [] Yes [] No    Therese Olsen MA  04/21/25, 09:37 EDT

## 2025-06-11 ENCOUNTER — OFFICE VISIT (OUTPATIENT)
Dept: ENDOCRINOLOGY | Facility: CLINIC | Age: 55
End: 2025-06-11
Payer: COMMERCIAL

## 2025-06-11 VITALS
BODY MASS INDEX: 34.56 KG/M2 | HEIGHT: 68 IN | HEART RATE: 89 BPM | DIASTOLIC BLOOD PRESSURE: 68 MMHG | WEIGHT: 228 LBS | OXYGEN SATURATION: 97 % | SYSTOLIC BLOOD PRESSURE: 120 MMHG

## 2025-06-11 DIAGNOSIS — D35.02 PHEOCHROMOCYTOMA OF LEFT ADRENAL GLAND: ICD-10-CM

## 2025-06-11 DIAGNOSIS — I10 ESSENTIAL HYPERTENSION: ICD-10-CM

## 2025-06-11 DIAGNOSIS — E11.9 TYPE 2 DIABETES MELLITUS WITHOUT COMPLICATION, WITHOUT LONG-TERM CURRENT USE OF INSULIN: Primary | ICD-10-CM

## 2025-06-11 LAB
EXPIRATION DATE: ABNORMAL
EXPIRATION DATE: ABNORMAL
GLUCOSE BLDC GLUCOMTR-MCNC: 187 MG/DL (ref 70–130)
HBA1C MFR BLD: 7.2 % (ref 4.5–5.7)
Lab: ABNORMAL
Lab: ABNORMAL

## 2025-06-11 PROCEDURE — 82947 ASSAY GLUCOSE BLOOD QUANT: CPT | Performed by: INTERNAL MEDICINE

## 2025-06-11 PROCEDURE — 99214 OFFICE O/P EST MOD 30 MIN: CPT | Performed by: INTERNAL MEDICINE

## 2025-06-11 PROCEDURE — 83036 HEMOGLOBIN GLYCOSYLATED A1C: CPT | Performed by: INTERNAL MEDICINE

## 2025-06-11 RX ORDER — METFORMIN HYDROCHLORIDE 500 MG/1
1000 TABLET, EXTENDED RELEASE ORAL
Start: 2025-06-11

## 2025-06-11 RX ORDER — DOXAZOSIN 4 MG/1
4 TABLET ORAL NIGHTLY
Start: 2025-06-11

## 2025-06-11 RX ORDER — GLIMEPIRIDE 2 MG/1
2 TABLET ORAL
Qty: 90 TABLET | Refills: 3 | Status: SHIPPED | OUTPATIENT
Start: 2025-06-11

## 2025-06-11 RX ORDER — BENAZEPRIL HYDROCHLORIDE AND HYDROCHLOROTHIAZIDE 10; 12.5 MG/1; MG/1
1 TABLET ORAL EVERY MORNING
Start: 2025-06-11

## 2025-06-11 NOTE — PROGRESS NOTES
"Chief Complaint   Patient presents with    Pheochromocytoma of left adrenal gland     Follow up    Uncontrolled type 2 diabetes mellitus with hyperglycemia     Refill glimiperide       HPI:   Sandra Colon is a 55 y.o.male who returns to Endocrine Clinic for f/u evaluation of Type 2 DM, hyperlipidemia, and recurrent pheochromocytoma. Last visit 01/22/2025.  His history is as follows:    Interim Events:   (03/2025) genetic testing through [facility] were negative for RET, VHL, and SDHB  (05/21/2025) Saw [facility] Endocrine Surgery in consultation. After further imaging and extent of tumor burden was assessed, pt was referred to Dr. Rooney with surgical oncology  (06/03/2025) Consultation Dr. Christ Rooney, surgical oncology. Surgery planned for 06/25/2025  -Patient reports he has been taking diabetic medications but has been taking his BP medications at different times than prescribed.      1) h/o left adrenal nodule (pheochromocytoma and nodular adrenocortical hyperplasia):  - initially evaluated by, gastroenterologist,  for diarrhea and elevated hepatic transaminases. As part of his evaluation he had a colonoscopy and an ultrasound of the abdomen completed at [facility] radiology on 8/29/17.  The ultrasound report had described a left kidney nodule that was later confirmed to be a left adrenal nodule on CT scan of the abdomen:   \"The right adrenal gland is normal. There is a low dense mass in the left adrenal gland measuring 3.0 x 4.3 cm. On the unenhanced image the adrenal mass has an attenuation coefficient of 28.5 Hounsfield units. The arterial phase demonstrates an attenuation coefficient of 33.2 Hounsfield units. The attenuation on venous imaging is 29.8 Hounsfield units and on delayed imaging 35.18 Hounsfield units. The mass demonstrates a thin rim of contrast enhancement primarily posteriorly.\"     Initial Endocrine Visit: (10/2017)  Lab evaluation at that time showed: normal plasma " "metanephrines  Normetanephrine 0 - 145 pg/mL 101    Metanephrine 0 - 62 pg/mL <10    - 8AM renin level of 0.186, and aldosterone of 13.1, normal renal function and K+   Pt could not complete a 1 mg overnight dexamethasone suppression test or repeat aldosterone/renin as planned     - 05/30/2018: presented to the  ED on  with cough and abdominal pain. CT scan of the Abdomen showed again the left adrenal nodule that had increased in size since 2017 and a new finding of a left renal mass that was not present on prior imaging. Impression: \" 5.1 x 3.3 cm septated hypoattenuating left adrenal mass, enlarged from comparison study. Findings support primary adrenal neoplasm such as pheochromocytoma. Recommend further evaluation\" and  \"3.4 cm hypoattenuating lesion within the upper pole of the left kidney,  best visualized on the delayed images (series 6, image 38), new from comparison study. Renal carcinoma possible. Recommend further evaluation.\"     - 06/2018:  pt then presented to the  hospital again with episode of gross hematuria. He was admitted for further evaluation.   The inpatient provider ordered a CT guided FNA of the left adrenal nodule (06/12/2018) - pathology report: \"blood and fragments of adrenal gland, no tumor seen\" In addition he was noted to have uncontrolled HTN and a new diagnosis of uncontrolled diabetes.      - 07/20/2018: Dr. Dariusz Medrano, Urology, had pt completed the following urine studies: [facility]  24 hr Urine Free Cortisol:  18.7 (4.0 - 50.0) - normal  24 hr Urine Creatinine: 1.98 (0.63 - 2.50) - normal  24 hr Urine Fractionated Metanephrines:  * Metanephrine: 114 (58 - 203) - normal  * Normetanephrine: 987 (88 - 649) - abnormal  * Total metanephrines 1101 (182 - 739)  24 hr Urine VMA: 3.7 ( < / = 6.0)     - 08/13/2018: Endocrine Follow-up visit:  Fractionated plasma metanephrines and other biochemical testing collected after visit: Confirmed that patient had not taken any " "medications to cause a false positive results (verfied no Tylenol).   08/14/2018 (8:04 AM):   Normetanephrine 373 (0 - 145), Metanephrine 22 (0 - 62)  Aldosterone 9.8  Renin 0.262 (0.167 - 5.380)  AM Cortisol (1 mg dex suppression test): 0.70, Appropriately suppressed to <1.8 after 1 mg overnight dexamethasone suppression test  08/20/2018 (8:40 AM): normetanephrine 469 (0 - 145), metanephrine 29 (0 - 62)    - Discussed case with Dr. Medrano, Urology and started pt on alpha blockade 2 weeks prior to surgery.         - 10/22/2018:  pt underwent left adrenalectomy on 10/22/2018 by Dr. Dariusz Medrano. Intraoperative use of US with renal exploration was completed. No renal lesions were seen.     Surgical pathology: pheochromocytoma and nodular adrenocortical hyperplasia.   \"Microscopic Description: Sections reveal a neoplasm within the medullary area area of the adrenal gland composed of polygonal shaped cells with moderate to abundant granular faintly blue/purple cytoplasm. Intranuclear inclusions are noted. Other areas show more diffuse and solid arrangements of tumor cells. Nuclear hyperchromasia and pleomorphism is noted prominently. Scattered mitoses with up to 3 Immunohistochemical stains are performed and reported as follows: Chromogranin A +, GATA3 +, Calretinin (-) in cells of interest, Inhibin (-) in cells of interest, Melan-A  (-) in cells of interest. The overall findings ae those of pheochromocytoma. Additionally, there is a nodule composed of adrenocortical cells, c/w nodular adrenocortical hyperplasia.\"    (03/2019) post-op plasma metanephrines normal  I was unable to complete genetic testing at that time due to cost to patient.   - pt was counseled multiple times that he will need lifelong follow-up due to possibility of recurrence.  Patient's last visit with me was January 12, 2022.     (01/2025) Endocrine f/u visit:   -Patient reports after his last visit with me in January 2022, he was only seeing his primary " "care provider.  There was no follow-up evaluation of his pheochromocytoma by any other provider.    0 In December 25, 2024 he presented to [facility] ED reporting left-sided chest pain and abdominal pain.  During that evaluation he was found to have a 2.6 x 3.0 centimeter recurrent mass in the adrenal bed.  He was referred by the [facility] ED to his [facility]rolog[facility] 12/[facility]/2024.  Patient was advised by his urologist to see his endocrinologist for further evaluation prior to surgical intervention.     (03/19/2025) [facility] genetic Pheochromocytoma Evaluation:   RET DNA sequencing negative  VHL DNA sequencing negative  SDHB DNA sequencing negative      (12/25/2025)  Rad CT Abd/Pelvis w/ constrast: \"The right adrenal gland appears normal. There are postsurgical changes of prior left adrenalectomy. There is a new soft tissue mass within the left adrenal bed measuring 2.6 x 3.0 cm best seen on image 64 of series 2. This is suspicious for disease recurrence.\"    In further review, pt reports taking benazepril/HCTZ 5mg-6.25 mg once daily.  He states his blood pressures in the 130s to 150s systolic/80s to 90s diastolic.  He reports left-sided upper quadrant pain intermittently and daily headaches in the morning.  He has had occasional episodes of diaphoresis and only 1 episode of tachycardia which occurred on December 25, 2025 when he presented to the ER    (05/21/2025) Saw [facility] Endocrine Surgery in consultation. After further imaging and extent of tumor burden was assessed, pt was referred to Dr. Ronoey with surgical oncology  (06/03/2025) Consultation Dr. Christ Rooney, surgical oncology.     2) Type 2 diabetes with no known associated complications at this time:  - diagnosed in June 2018 - A1C% 11.90. Prescribed 3 oral agents by his PCP at that time  - pt stopped his medications post-op in 10/2018  - I restarted the medications in 11/2018, but patient " stopped the medications again  - reports the Janumet gave him headaches  - did not tolerate Synjardy  - restarted metformin in 2020    Current DM Medications:   Metformin  m tabs daily   Glimepiride: 2 mg daily    Glucometer Review: no meter for review    DM Health Maintenance:  Ophtho: Last visit - due  Podiatry: Last visit - N/A  Monofilament / Foot exam: due  Lipids: (2025) Tchol 146, , HDL 30, LDL 78 - on atorvastatin 40 mg  Urine Microalb/Cr ratio: (2021) normal at 5.2  CMP: (2025) Cr 0.88, GFR 91.6, AST 55,   CBC: (2025) WNL, Hgb 14.7  TSH: (2025) - normal at 1.570  Vit B12: (2025) 530    FMHx: He has a family history of diabetes but reports no known family history of hypertension     3) essential HTN:   - HTN diagnosed in his early 40's  - pt had been off all antihypertensive medications post-operatively 10/2018  - In 2021, started benazepril/HCTZ 5-6.25 mg daily.    - Pt currently taking benazepril/HCTZ 5-6.25 mg daily with good compliance. Has been taking doxazosin 2 mg during the day and not at night as prescribed.   BP readings were reviewed form his home BP machine:   AM readings uncontrolled 150's/100's as pt has been taking doxazosin in the day and not at night  Evening readings controlled: 100's /80's - 90's    4) mixed hyperlipidemia:   - is taking atorvastatin 40 mg daily. Has not been taking recently  - 2019 Abd US showed fatty liver    Review of Systems   Constitutional:  Positive for fatigue.        Weight gain, mild   Eyes: Negative.  Negative for itching.   Respiratory: Negative.     Cardiovascular:  Positive for leg swelling. Negative for palpitations.   Gastrointestinal:  Positive for abdominal pain (LUQ).   Endocrine: Negative for polyuria.   Genitourinary: Negative.  Negative for frequency.   Musculoskeletal: Negative.  Negative for back pain.   Skin: Negative.    Allergic/Immunologic: Negative.    Neurological:  Positive for headaches (AM).  "  Hematological: Negative.    Psychiatric/Behavioral: Negative.         The following portions of the patient's history were reviewed and updated as appropriate: allergies, current medications, past family history, past medical history, past social history, past surgical history and problem list.    Vitals:    06/11/25 1354   BP: 120/68   BP Location: Right arm   Patient Position: Sitting   Cuff Size: Adult   Pulse: 89   SpO2: 97%   Weight: 103 kg (228 lb)   Height: 172.7 cm (68\")     Physical Exam   Constitutional: He is oriented to person, place, and time. He appears well-developed. No distress.   HENT:   Head: Normocephalic.   Eyes: Pupils are equal, round, and reactive to light. Conjunctivae are normal.   Neck: No tracheal deviation present. No thyromegaly present.   No palpable thyroid nodules     Cardiovascular: Normal rate, regular rhythm and normal heart sounds.   No murmur heard.  Pulmonary/Chest: Effort normal and breath sounds normal. No respiratory distress.   Abdominal: Soft. Normal appearance and bowel sounds are normal. He exhibits no mass. There is no abdominal tenderness.   Surgical scar well healed   Lymphadenopathy:     He has no cervical adenopathy.   Neurological: He is alert and oriented to person, place, and time. No cranial nerve deficit.   Skin: Skin is warm and dry. He is not diaphoretic. No erythema.   Acanthosis nigricans   Psychiatric: His behavior is normal.   Vitals reviewed.    LABS/IMAGING: outside records reviewed and summarized in HPI  Office Visit on 06/11/2025   Component Date Value Ref Range Status    Hemoglobin A1C 06/11/2025 7.2 (A)  4.5 - 5.7 % Final    Lot Number 06/11/2025 [id number]   Final    Expiration Date 06/11/2025 02/26/2027   Final    Glucose 06/11/2025 187 (A)  70 - 130 mg/dL Final    Lot Number 06/11/2025 [id number]   Final    Expiration Date 06/11/2025 12/14/2025   Final       ASSESSMENT/PLAN:    1) left pheochromocytoma: recurrence of disease in left adrenal bed. "   (05/21/2025) Saw [facility] Endocrine Surgery in consultation. After further imaging and extent of tumor burden was assessed, pt was referred to Dr. Rooney with surgical oncology  (06/03/2025) Consultation Dr. Christ Rooney, surgical oncology. Is scheduled for surgery 06/25/2025.   - pheochromocytoma evaluation (ret, VHL, SDHB panel) through [facility] was negative.     -For alpha blockade I have increased the doxazosin to  4 mg nightly. Advised pt he must take this medication nightly. Have increased the benazepril HCTZ to 10/12.5 mg daily every morning.  Patient is to keep a blood pressure log and contact me for any worsening symptoms of headache, high blood pressure, tachycardia.    ADDENDUM: BP log from 06/12/2025 to 6/19/2025 reviewed. BP at goal ranging 110' - 130's / 80's - 90's. Had to decrease the benazepril HCTZ back to 5/6.25 mg qAM due to daytime hypotension. Continued the doxazosin 4 mg qhs  - Have communicated this information with Dr. Rooney.     2) Type 2 diabetes with no known associated complications at this time: fair control, A1C% 7.2 today.   - Continue metformin  mg tabs: Patient to take 2 tabs every morning  - Continue glimepiride 2 mg every morning.  Reviewed risk of hypoglycemia on this medication    3) mixed hyperlipidemia:  - continue atorvastatin 40 mg daily  - Advised pt to avoid alcohol intake.     Follow-up visit 08/06/2025    Electronically Signed: Roma Abbott MD

## 2025-07-01 ENCOUNTER — READMISSION MANAGEMENT (OUTPATIENT)
Dept: CALL CENTER | Facility: HOSPITAL | Age: 55
End: 2025-07-01
Payer: COMMERCIAL

## 2025-07-01 NOTE — OUTREACH NOTE
Prep Survey      Flowsheet Row Responses   [facility] facility patient discharged from? Non-BH   Is LACE score < 7 ? Non-BH Discharge   Eligibility [facility]   Date of Admission 06/25/25   Date of Discharge 07/01/25   Discharge Disposition Home or Self Care   Discharge diagnosis Pheochromocytoma of left adrenal gland, exploratory laparotomy, resection of retroperitoneal mass, distal pancreatectomy/splenectomy   Does the patient have one of the following disease processes/diagnoses(primary or secondary)? General Surgery   Prep survey completed? Yes            CHARLI PLATT - Registered Nurse

## 2025-07-02 ENCOUNTER — TRANSITIONAL CARE MANAGEMENT TELEPHONE ENCOUNTER (OUTPATIENT)
Dept: CALL CENTER | Facility: HOSPITAL | Age: 55
End: 2025-07-02
Payer: COMMERCIAL

## 2025-07-02 NOTE — OUTREACH NOTE
Call Center TCM Note      Flowsheet Row Responses   [facility] facility patient discharged from? Non-BH  [D/c'd from [facility]]   Does the patient have one of the following disease processes/diagnoses(primary or secondary)? General Surgery   TCM attempt successful? No  [VR-Dtr, Virgil Grier or Spouse Blanche Colon]   Unsuccessful attempts Attempt 1            Shaina Serra Registered Nurse    7/2/2025, 11:16 EDT

## 2025-07-02 NOTE — OUTREACH NOTE
Call Center TCM Note      Flowsheet Row Responses   [facility] facility patient discharged from? Non-BH   Does the patient have one of the following disease processes/diagnoses(primary or secondary)? General Surgery   TCM attempt successful? No   Unsuccessful attempts Attempt 2  [Attempted patient, Dtr Blanche and Spouse]            Shaina FIGUEROA - Registered Nurse    7/2/2025, 12:36 EDT

## 2025-07-03 ENCOUNTER — TRANSITIONAL CARE MANAGEMENT TELEPHONE ENCOUNTER (OUTPATIENT)
Dept: CALL CENTER | Facility: HOSPITAL | Age: 55
End: 2025-07-03
Payer: COMMERCIAL

## 2025-07-03 NOTE — OUTREACH NOTE
Call Center TCM Note      Flowsheet Row Responses   [facility] patient discharged from? Non-BH   Does the patient have one of the following disease processes/diagnoses(primary or secondary)? General Surgery   TCM attempt successful? No   Unsuccessful attempts Attempt 3   Call Status Left message            Alesha Serra Registered Nurse    7/3/2025, 15:27 EDT

## 2025-07-07 ENCOUNTER — READMISSION MANAGEMENT (OUTPATIENT)
Dept: CALL CENTER | Facility: HOSPITAL | Age: 55
End: 2025-07-07
Payer: COMMERCIAL

## 2025-07-07 ENCOUNTER — TRANSITIONAL CARE MANAGEMENT TELEPHONE ENCOUNTER (OUTPATIENT)
Dept: CALL CENTER | Facility: HOSPITAL | Age: 55
End: 2025-07-07
Payer: COMMERCIAL

## 2025-07-07 NOTE — OUTREACH NOTE
Prep Survey      Flowsheet Row Responses   [facility] facility patient discharged from? Non-BH   Is LACE score < 7 ? Non-BH Discharge   Eligibility [facility]   Date of Admission 07/05/25   Date of Discharge 07/06/25   Discharge Disposition Home or Self Care   Discharge diagnosis Generalized abdominal pain   Does the patient have one of the following disease processes/diagnoses(primary or secondary)? Other   Prep survey completed? Yes            Sheila JOHNSON - Registered Nurse

## 2025-07-07 NOTE — OUTREACH NOTE
Call Center TCM Note      Flowsheet Row Responses   [facility] patient discharged from? Non-  [D/c'd from [facility]]   Does the patient have one of the following disease processes/diagnoses(primary or secondary)? Other   TCM attempt successful? Yes   Call start time 1317   Call end time 1321   Discharge diagnosis Generalized abdominal pain   Meds reviewed with patient/caregiver? Yes   Is the patient having any side effects they believe may be caused by any medication additions or changes? No   Does the patient have all medications ordered at discharge? Yes   Is the patient taking all medications as directed (includes completed medication regime)? Yes   Does the patient have an appointment with their PCP within 7-14 days of discharge? No   Nursing Interventions Patient declined scheduling/rescheduling appointment at this time, Routed TCM call to PCP office   Has home health visited the patient within 72 hours of discharge? N/A   Psychosocial issues? No   Did the patient receive a copy of their discharge instructions? Yes   Nursing interventions Reviewed instructions with patient   What is the patient's perception of their health status since discharge? Improving   Is the patient/caregiver able to teach back signs and symptoms related to disease process for when to call PCP? Yes   Is the patient/caregiver able to teach back signs and symptoms related to disease process for when to call 911? Yes   Is the patient/caregiver able to teach back the hierarchy of who to call/visit for symptoms/problems? PCP, Specialist, Home health nurse, Urgent Care, ED, 911 Yes   If the patient is a current smoker, are they able to teach back resources for cessation? Not a smoker   TCM call completed? Yes   Wrap up additional comments Patient reports abdominal pain improved, no concerns today.   Call end time 1321   Would this patient benefit from a Referral to [facility] Social Work? No   Is the patient interested in additional calls  from an ambulatory ? Vangie FIGUEROA - Registered Nurse    7/7/2025, 13:21 EDT

## 2025-07-14 ENCOUNTER — READMISSION MANAGEMENT (OUTPATIENT)
Dept: CALL CENTER | Facility: HOSPITAL | Age: 55
End: 2025-07-14
Payer: COMMERCIAL

## 2025-07-14 NOTE — OUTREACH NOTE
Prep Survey      Flowsheet Row Responses   [facility] facility patient discharged from? Non-BH   Is LACE score < 7 ? Non-BH Discharge   Eligibility [facility]   Date of Admission 07/13/25   Date of Discharge 07/14/25   Discharge Disposition Home or Self Care   Discharge diagnosis Pre-syncope   Does the patient have one of the following disease processes/diagnoses(primary or secondary)? Other   Prep survey completed? Yes            CHARLI PLATT - Registered Nurse

## 2025-07-15 ENCOUNTER — TRANSITIONAL CARE MANAGEMENT TELEPHONE ENCOUNTER (OUTPATIENT)
Dept: CALL CENTER | Facility: HOSPITAL | Age: 55
End: 2025-07-15
Payer: COMMERCIAL

## 2025-07-15 NOTE — OUTREACH NOTE
Call Center TCM Note      Flowsheet Row Responses   [facility] patient discharged from? Non-BH   Does the patient have one of the following disease processes/diagnoses(primary or secondary)? Other   TCM attempt successful? Yes  [VR lists: Blanche and Virgil]   Call start time 1326   Call end time 1328   Discharge diagnosis Pre-syncope   Is the patient taking all medications as directed (includes completed medication regime)? Yes   Does the patient have an appointment with their PCP within 7-14 days of discharge? No   Nursing Interventions Patient declined scheduling/rescheduling appointment at this time, Patient desires to follow up with specialty only   What is the patient's perception of their health status since discharge? Improving   Is the patient/caregiver able to teach back signs and symptoms related to disease process for when to call PCP? Yes   Is the patient/caregiver able to teach back signs and symptoms related to disease process for when to call 911? Yes   TCM call completed? Yes   Call end time 1328            Suzette Serra Registered Nurse    7/15/2025, 13:28 EDT

## 2025-08-06 PROCEDURE — 85027 COMPLETE CBC AUTOMATED: CPT | Performed by: INTERNAL MEDICINE

## 2025-08-06 PROCEDURE — 83835 ASSAY OF METANEPHRINES: CPT | Performed by: INTERNAL MEDICINE

## 2025-08-06 PROCEDURE — 80053 COMPREHEN METABOLIC PANEL: CPT | Performed by: INTERNAL MEDICINE
